# Patient Record
Sex: FEMALE | Race: WHITE | Employment: OTHER | URBAN - METROPOLITAN AREA
[De-identification: names, ages, dates, MRNs, and addresses within clinical notes are randomized per-mention and may not be internally consistent; named-entity substitution may affect disease eponyms.]

---

## 2017-01-04 RX ORDER — PRAVASTATIN SODIUM 40 MG
TABLET ORAL
Qty: 90 TABLET | Refills: 1 | Status: SHIPPED | OUTPATIENT
Start: 2017-01-04 | End: 2017-07-07 | Stop reason: SDUPTHER

## 2017-01-04 RX ORDER — LEVOTHYROXINE SODIUM 0.15 MG/1
TABLET ORAL
Qty: 90 TABLET | Refills: 1 | Status: SHIPPED | OUTPATIENT
Start: 2017-01-04 | End: 2017-07-07 | Stop reason: SDUPTHER

## 2017-01-04 RX ORDER — ATENOLOL 50 MG/1
50 TABLET ORAL DAILY
Qty: 90 TABLET | Refills: 1 | Status: SHIPPED | OUTPATIENT
Start: 2017-01-04 | End: 2017-05-01

## 2017-04-12 RX ORDER — NEBIVOLOL HYDROCHLORIDE 5 MG/1
TABLET ORAL
Qty: 90 TABLET | Refills: 2 | OUTPATIENT
Start: 2017-04-12

## 2017-05-01 ENCOUNTER — TELEPHONE (OUTPATIENT)
Dept: PHARMACY | Facility: CLINIC | Age: 62
End: 2017-05-01

## 2017-05-01 ENCOUNTER — OFFICE VISIT (OUTPATIENT)
Dept: SURGERY | Age: 62
End: 2017-05-01

## 2017-05-01 VITALS
SYSTOLIC BLOOD PRESSURE: 138 MMHG | HEART RATE: 80 BPM | HEIGHT: 66 IN | BODY MASS INDEX: 33.59 KG/M2 | DIASTOLIC BLOOD PRESSURE: 84 MMHG | WEIGHT: 209 LBS

## 2017-05-01 DIAGNOSIS — E03.9 HYPOTHYROIDISM, UNSPECIFIED TYPE: ICD-10-CM

## 2017-05-01 DIAGNOSIS — E03.9 HYPOTHYROIDISM, UNSPECIFIED TYPE: Primary | ICD-10-CM

## 2017-05-01 DIAGNOSIS — I10 ESSENTIAL HYPERTENSION: ICD-10-CM

## 2017-05-01 DIAGNOSIS — E78.5 HYPERLIPIDEMIA, UNSPECIFIED HYPERLIPIDEMIA TYPE: ICD-10-CM

## 2017-05-01 DIAGNOSIS — E04.9 THYROID GOITER: ICD-10-CM

## 2017-05-01 LAB
ANION GAP SERPL CALCULATED.3IONS-SCNC: 10 MEQ/L (ref 7–13)
BUN BLDV-MCNC: 15 MG/DL (ref 8–23)
CALCIUM SERPL-MCNC: 8.6 MG/DL (ref 8.6–10.2)
CHLORIDE BLD-SCNC: 104 MEQ/L (ref 98–107)
CHOLESTEROL, TOTAL: 171 MG/DL (ref 0–199)
CO2: 24 MEQ/L (ref 22–29)
CREAT SERPL-MCNC: 0.83 MG/DL (ref 0.5–0.9)
GFR AFRICAN AMERICAN: >60
GFR NON-AFRICAN AMERICAN: >60
GLUCOSE BLD-MCNC: 101 MG/DL (ref 74–109)
HDLC SERPL-MCNC: 36 MG/DL (ref 40–59)
LDL CHOLESTEROL CALCULATED: 104 MG/DL (ref 0–129)
POTASSIUM SERPL-SCNC: 3.9 MEQ/L (ref 3.5–5.1)
SODIUM BLD-SCNC: 138 MEQ/L (ref 132–144)
TRIGL SERPL-MCNC: 156 MG/DL (ref 0–200)
TSH SERPL DL<=0.05 MIU/L-ACNC: 2.34 UIU/ML (ref 0.27–4.2)

## 2017-05-01 PROCEDURE — 99213 OFFICE O/P EST LOW 20 MIN: CPT | Performed by: INTERNAL MEDICINE

## 2017-05-01 RX ORDER — NEBIVOLOL 5 MG/1
TABLET ORAL
Qty: 21 TABLET | Refills: 3 | Status: SHIPPED | OUTPATIENT
Start: 2017-05-01 | End: 2018-06-27 | Stop reason: SDUPTHER

## 2017-05-01 RX ORDER — NEBIVOLOL 5 MG/1
TABLET ORAL
Qty: 90 TABLET | Refills: 3 | Status: SHIPPED | OUTPATIENT
Start: 2017-05-01 | End: 2017-05-01 | Stop reason: SDUPTHER

## 2017-05-02 LAB — T4 FREE: 1.31 NG/DL (ref 0.93–1.7)

## 2017-05-06 ENCOUNTER — EMPLOYEE WELLNESS (OUTPATIENT)
Dept: OTHER | Age: 62
End: 2017-05-06

## 2017-05-06 LAB
CHOLESTEROL, TOTAL: 163 MG/DL (ref 0–199)
GLUCOSE BLD-MCNC: 96 MG/DL (ref 74–109)
HDLC SERPL-MCNC: 38 MG/DL (ref 40–59)
LDL CHOLESTEROL CALCULATED: 99 MG/DL (ref 0–129)
TRIGL SERPL-MCNC: 130 MG/DL (ref 0–200)

## 2017-05-08 ENCOUNTER — HOSPITAL ENCOUNTER (OUTPATIENT)
Dept: ULTRASOUND IMAGING | Age: 62
Discharge: HOME | End: 2017-05-08
Payer: COMMERCIAL

## 2017-05-08 DIAGNOSIS — E04.9 GOITER: ICD-10-CM

## 2017-05-08 PROCEDURE — 76536 US EXAM OF HEAD AND NECK: CPT

## 2017-07-07 RX ORDER — LEVOTHYROXINE SODIUM 150 MCG
TABLET ORAL
Qty: 90 TABLET | Refills: 1 | Status: SHIPPED | OUTPATIENT
Start: 2017-07-07 | End: 2017-10-31 | Stop reason: SDUPTHER

## 2017-07-07 RX ORDER — PRAVASTATIN SODIUM 40 MG
TABLET ORAL
Qty: 90 TABLET | Refills: 1 | Status: SHIPPED | OUTPATIENT
Start: 2017-07-07 | End: 2018-02-03 | Stop reason: SDUPTHER

## 2017-08-15 ENCOUNTER — OFFICE VISIT (OUTPATIENT)
Dept: FAMILY MEDICINE CLINIC | Age: 62
End: 2017-08-15

## 2017-08-15 VITALS
RESPIRATION RATE: 18 BRPM | WEIGHT: 210 LBS | TEMPERATURE: 97.2 F | OXYGEN SATURATION: 97 % | HEART RATE: 74 BPM | HEIGHT: 66 IN | DIASTOLIC BLOOD PRESSURE: 76 MMHG | SYSTOLIC BLOOD PRESSURE: 130 MMHG | BODY MASS INDEX: 33.75 KG/M2

## 2017-08-15 DIAGNOSIS — R10.32 LEFT LOWER QUADRANT PAIN: Primary | ICD-10-CM

## 2017-08-15 PROCEDURE — 99213 OFFICE O/P EST LOW 20 MIN: CPT | Performed by: FAMILY MEDICINE

## 2017-08-15 ASSESSMENT — ENCOUNTER SYMPTOMS
ABDOMINAL PAIN: 1
CONSTIPATION: 1
COUGH: 0
RESPIRATORY NEGATIVE: 1
EYES NEGATIVE: 1
RHINORRHEA: 0
CHEST TIGHTNESS: 0

## 2017-08-28 ENCOUNTER — HOSPITAL ENCOUNTER (OUTPATIENT)
Dept: CT IMAGING | Age: 62
Discharge: HOME | End: 2017-08-28
Payer: COMMERCIAL

## 2017-08-28 VITALS
DIASTOLIC BLOOD PRESSURE: 96 MMHG | HEIGHT: 66 IN | SYSTOLIC BLOOD PRESSURE: 154 MMHG | BODY MASS INDEX: 33.43 KG/M2 | WEIGHT: 208 LBS

## 2017-08-28 DIAGNOSIS — R10.32 LEFT LOWER QUADRANT PAIN: ICD-10-CM

## 2017-08-28 LAB
GFR AFRICAN AMERICAN: >60
GFR NON-AFRICAN AMERICAN: 56
PERFORMED ON: ABNORMAL
POC CREATININE: 1 MG/DL (ref 0.6–1.2)
POC SAMPLE TYPE: ABNORMAL

## 2017-08-28 PROCEDURE — 74177 CT ABD & PELVIS W/CONTRAST: CPT

## 2017-08-28 PROCEDURE — 6360000004 HC RX CONTRAST MEDICATION: Performed by: RADIOLOGY

## 2017-08-28 PROCEDURE — 2500000003 HC RX 250 WO HCPCS: Performed by: RADIOLOGY

## 2017-08-28 PROCEDURE — 2580000003 HC RX 258: Performed by: RADIOLOGY

## 2017-08-28 RX ORDER — SODIUM CHLORIDE 0.9 % (FLUSH) 0.9 %
10 SYRINGE (ML) INJECTION ONCE
Status: COMPLETED | OUTPATIENT
Start: 2017-08-28 | End: 2017-08-28

## 2017-08-28 RX ADMIN — IOPAMIDOL 100 ML: 755 INJECTION, SOLUTION INTRAVENOUS at 09:05

## 2017-08-28 RX ADMIN — SODIUM CHLORIDE, PRESERVATIVE FREE 10 ML: 5 INJECTION INTRAVENOUS at 09:06

## 2017-08-28 RX ADMIN — BARIUM SULFATE 450 ML: 21 SUSPENSION ORAL at 09:06

## 2017-09-06 ENCOUNTER — OFFICE VISIT (OUTPATIENT)
Dept: FAMILY MEDICINE CLINIC | Age: 62
End: 2017-09-06

## 2017-09-06 VITALS
WEIGHT: 210 LBS | SYSTOLIC BLOOD PRESSURE: 124 MMHG | RESPIRATION RATE: 18 BRPM | BODY MASS INDEX: 33.75 KG/M2 | DIASTOLIC BLOOD PRESSURE: 78 MMHG | HEIGHT: 66 IN | TEMPERATURE: 98.1 F | OXYGEN SATURATION: 97 % | HEART RATE: 74 BPM

## 2017-09-06 DIAGNOSIS — N20.0 KIDNEY STONE: Primary | ICD-10-CM

## 2017-09-06 PROCEDURE — 99213 OFFICE O/P EST LOW 20 MIN: CPT | Performed by: FAMILY MEDICINE

## 2017-09-06 ASSESSMENT — ENCOUNTER SYMPTOMS
RESPIRATORY NEGATIVE: 1
RHINORRHEA: 0
CHEST TIGHTNESS: 0
COUGH: 0
EYES NEGATIVE: 1
BACK PAIN: 1
GASTROINTESTINAL NEGATIVE: 1

## 2017-09-15 ENCOUNTER — OFFICE VISIT (OUTPATIENT)
Dept: UROLOGY | Age: 62
End: 2017-09-15

## 2017-09-15 VITALS
DIASTOLIC BLOOD PRESSURE: 86 MMHG | HEIGHT: 67 IN | SYSTOLIC BLOOD PRESSURE: 136 MMHG | WEIGHT: 210 LBS | BODY MASS INDEX: 32.96 KG/M2 | HEART RATE: 80 BPM

## 2017-09-15 DIAGNOSIS — N20.0 KIDNEY STONE: Primary | ICD-10-CM

## 2017-09-15 LAB
BILIRUBIN, POC: ABNORMAL
BLOOD URINE, POC: ABNORMAL
CLARITY, POC: CLEAR
COLOR, POC: YELLOW
GLUCOSE URINE, POC: ABNORMAL
KETONES, POC: ABNORMAL
LEUKOCYTE EST, POC: ABNORMAL
NITRITE, POC: ABNORMAL
PH, POC: 5
PROTEIN, POC: ABNORMAL
SPECIFIC GRAVITY, POC: 1.02
UROBILINOGEN, POC: 0.2

## 2017-09-15 PROCEDURE — 81003 URINALYSIS AUTO W/O SCOPE: CPT | Performed by: UROLOGY

## 2017-09-15 PROCEDURE — 99243 OFF/OP CNSLTJ NEW/EST LOW 30: CPT | Performed by: UROLOGY

## 2017-09-15 ASSESSMENT — ENCOUNTER SYMPTOMS
DIARRHEA: 0
RESPIRATORY NEGATIVE: 1
VOMITING: 0
EYES NEGATIVE: 1
ABDOMINAL DISTENTION: 0
NAUSEA: 0
BACK PAIN: 1

## 2017-10-20 ENCOUNTER — HOSPITAL ENCOUNTER (OUTPATIENT)
Dept: WOMENS IMAGING | Age: 62
Discharge: HOME | End: 2017-10-20
Payer: COMMERCIAL

## 2017-10-20 DIAGNOSIS — Z01.419 WELL WOMAN EXAM WITH ROUTINE GYNECOLOGICAL EXAM: ICD-10-CM

## 2017-10-20 PROCEDURE — 77063 BREAST TOMOSYNTHESIS BI: CPT

## 2017-10-31 RX ORDER — LEVOTHYROXINE SODIUM 0.15 MG/1
150 TABLET ORAL DAILY
Qty: 90 TABLET | Refills: 1 | Status: SHIPPED | OUTPATIENT
Start: 2017-10-31 | End: 2018-06-19 | Stop reason: SDUPTHER

## 2017-10-31 NOTE — TELEPHONE ENCOUNTER
From: Selina Charles  Sent: 10/30/2017 9:23 PM EDT  Subject: Medication Renewal Request    Louie Jimenez.  Aleksandar Tomas would like a refill of the following medications:  SYNTHROID 150 MCG tablet Rianna Wilder MD]    Preferred pharmacy: [address] [place], [place] [phone] F [phone]    Comment:

## 2017-11-04 ENCOUNTER — HOSPITAL ENCOUNTER (OUTPATIENT)
Dept: GENERAL RADIOLOGY | Age: 62
Discharge: HOME | End: 2017-11-04
Payer: COMMERCIAL

## 2017-11-04 DIAGNOSIS — N20.0 KIDNEY STONE: ICD-10-CM

## 2017-11-04 PROCEDURE — 74415 UROGRAPHY NFS DRIP&/BLS W/NF: CPT

## 2017-11-04 PROCEDURE — 6360000004 HC RX CONTRAST MEDICATION: Performed by: UROLOGY

## 2017-11-04 RX ADMIN — IOVERSOL 100 ML: 678 INJECTION INTRA-ARTERIAL; INTRAVENOUS at 09:09

## 2017-11-07 ENCOUNTER — OFFICE VISIT (OUTPATIENT)
Dept: UROLOGY | Age: 62
End: 2017-11-07

## 2017-11-07 VITALS
SYSTOLIC BLOOD PRESSURE: 120 MMHG | DIASTOLIC BLOOD PRESSURE: 72 MMHG | HEIGHT: 67 IN | HEART RATE: 64 BPM | WEIGHT: 212 LBS | BODY MASS INDEX: 33.27 KG/M2

## 2017-11-07 DIAGNOSIS — N20.0 KIDNEY STONE: Primary | ICD-10-CM

## 2017-11-07 PROCEDURE — 99213 OFFICE O/P EST LOW 20 MIN: CPT | Performed by: UROLOGY

## 2017-11-07 ASSESSMENT — ENCOUNTER SYMPTOMS: SHORTNESS OF BREATH: 0

## 2017-11-07 NOTE — PROGRESS NOTES
(PRESERVISION AREDS PO) Take  by mouth. No current facility-administered medications for this visit. Review of patient's allergies indicates no known allergies. reviewed      Review of Systems   Constitutional: Negative for fever and unexpected weight change. Respiratory: Negative for shortness of breath. Cardiovascular: Negative for chest pain. Genitourinary: Negative for dysuria, flank pain and hematuria. Objective:   Physical Exam   Constitutional: She appears well-developed and well-nourished. Abdominal: Soft. She exhibits no distension. There is no tenderness. There is no rebound and no guarding. Neurological: She is alert. Psychiatric: She has a normal mood and affect. Her behavior is normal.       11/4/2017  9:54 AM Caleb, Chpo Incoming Radiant Results From [facility] William Pappas MD Results   Narrative   FL IVP WITH TOMOGRAM : 11/4/2017       CLINICAL HISTORY: N20.0 Kidney stone ICD10.       COMPARISON: CT abdomen and pelvis 8/28/2017.       TECHNIQUE: Two  radiographs of the abdomen and pelvis were obtained. Serial radiographs were obtained after the intravenous administration of approximately 100 mL of Optiray 320 contrast.           FINDINGS:        A 1 cm nonobstructing left lower pole renal calculus appears unchanged when compared to the prior CT. Several small phleboliths in the pelvis appear unchanged.       Mild to moderate rotary levoscoliosis and surgical clips from previous cholecystectomy are again noted.        Both kidneys enhance and excrete contrast symmetrically. There is no hydronephrosis, suspicious filling defects within    the upper urinary tracts, or other findings of concern identified.        The urinary bladder appears within normal limits. There is significant no post void residual volume.                   Impression       1 CM NONOBSTRUCTING LEFT LOWER POLE RENAL CALCULUS.       OTHERWISE, NEGATIVE IVP. Assessment:       This is a 66 yo female with h/o HTN, hypothyroidism and elevated cholesterol and with Lt LP renal stone without obstruction and no current symptoms. The option of elective ESWL +/- stent was discussed and she is not currently interested. The risks and benefits of ESWL with possible cystoscopy and stent were discussed including but not limited to, infection/sepsis, pain, bleeding/transfusion, lack of efficacy and need for multiple or alternate treatments, urethral/bladder/ureteral injury, renal injury or loss, need for percutaneous nephrostomy tube. She understands that without treatment she could have stone growth, acute colic or UTI. Also, discussed dietary stone prevention, increasing urine output over 2.5l per day, normocalcemic diet, no added salt, and decreased animal proteins. Printed info on stone diet and ESWL was given to the patient today for review          Plan:      1.  F/U 6 mo for KUB or sooner if gets recurrent pain or wants treatment

## 2017-11-18 DIAGNOSIS — E78.5 HYPERLIPIDEMIA, UNSPECIFIED HYPERLIPIDEMIA TYPE: ICD-10-CM

## 2017-11-18 DIAGNOSIS — E03.9 HYPOTHYROIDISM, UNSPECIFIED TYPE: ICD-10-CM

## 2017-11-18 LAB
ALBUMIN SERPL-MCNC: 4.5 G/DL (ref 3.9–4.9)
ALP BLD-CCNC: 55 U/L (ref 40–130)
ALT SERPL-CCNC: 25 U/L (ref 0–33)
ANION GAP SERPL CALCULATED.3IONS-SCNC: 15 MEQ/L (ref 7–13)
AST SERPL-CCNC: 20 U/L (ref 0–35)
BILIRUB SERPL-MCNC: 0.1 MG/DL (ref 0–1.2)
BILIRUBIN DIRECT: 0 MG/DL (ref 0–0.3)
BILIRUBIN, INDIRECT: 0.1 MG/DL (ref 0–0.6)
BUN BLDV-MCNC: 21 MG/DL (ref 8–23)
CALCIUM SERPL-MCNC: 9.1 MG/DL (ref 8.6–10.2)
CHLORIDE BLD-SCNC: 106 MEQ/L (ref 98–107)
CHOLESTEROL, TOTAL: 200 MG/DL (ref 0–199)
CO2: 23 MEQ/L (ref 22–29)
CREAT SERPL-MCNC: 0.88 MG/DL (ref 0.5–0.9)
GFR AFRICAN AMERICAN: >60
GFR NON-AFRICAN AMERICAN: >60
GLUCOSE BLD-MCNC: 100 MG/DL (ref 74–109)
HDLC SERPL-MCNC: 31 MG/DL (ref 40–59)
LDL CHOLESTEROL CALCULATED: 126 MG/DL (ref 0–129)
POTASSIUM SERPL-SCNC: 4.9 MEQ/L (ref 3.5–5.1)
SODIUM BLD-SCNC: 144 MEQ/L (ref 132–144)
T4 FREE: 1.07 NG/DL (ref 0.93–1.7)
TOTAL PROTEIN: 7.1 G/DL (ref 6.4–8.1)
TRIGL SERPL-MCNC: 214 MG/DL (ref 0–200)
TSH SERPL DL<=0.05 MIU/L-ACNC: 16.45 UIU/ML (ref 0.27–4.2)

## 2017-11-28 ENCOUNTER — OFFICE VISIT (OUTPATIENT)
Dept: SURGERY | Age: 62
End: 2017-11-28

## 2017-11-28 VITALS
WEIGHT: 211 LBS | BODY MASS INDEX: 33.91 KG/M2 | HEIGHT: 66 IN | SYSTOLIC BLOOD PRESSURE: 137 MMHG | HEART RATE: 59 BPM | DIASTOLIC BLOOD PRESSURE: 78 MMHG

## 2017-11-28 DIAGNOSIS — I10 ESSENTIAL HYPERTENSION: ICD-10-CM

## 2017-11-28 DIAGNOSIS — E78.5 HYPERLIPIDEMIA, UNSPECIFIED HYPERLIPIDEMIA TYPE: ICD-10-CM

## 2017-11-28 DIAGNOSIS — E03.9 HYPOTHYROIDISM, UNSPECIFIED TYPE: Primary | ICD-10-CM

## 2017-11-28 PROCEDURE — 99213 OFFICE O/P EST LOW 20 MIN: CPT | Performed by: INTERNAL MEDICINE

## 2017-11-28 NOTE — PROGRESS NOTES
Subjective:        Patient ID: Philippe Sosa is a 65 y.o. female. Hypertension   This is a chronic problem. The current episode started more than 1 year ago. The problem has been waxing and waning since onset. The problem is uncontrolled. Risk factors for coronary artery disease include obesity and post-menopausal state. Past treatments include beta blockers. Compliance problems include diet. Hyperlipidemia   This is a chronic problem. The current episode started more than 1 year ago. The problem is controlled. Recent lipid tests were reviewed and are variable. Exacerbating diseases include obesity. Current antihyperlipidemic treatment includes statins. The current treatment provides moderate improvement of lipids. Compliance problems include adherence to diet. Risk factors for coronary artery disease include dyslipidemia, hypertension, obesity and post-menopausal.         Hypothyroidism on replacement with synthroid  150 mcg       Obesity  Body mass index is 34.06 kg/m². Patient Active Problem List   Diagnosis    Hypothyroidism    HTN (hypertension)    Hyperlipidemia    Hypothyroidism    Thyroid goiter    Obesity       Current Outpatient Prescriptions:     levothyroxine (SYNTHROID) 150 MCG tablet, Take 1 tablet by mouth daily, Disp: 90 tablet, Rfl: 1    pravastatin (PRAVACHOL) 40 MG tablet, TAKE ONE TABLET BY MOUTH ONE TIME A DAY, Disp: 90 tablet, Rfl: 1    nebivolol (BYSTOLIC) 5 MG tablet, TAKE 1 TABLET DAILY, Disp: 21 tablet, Rfl: 03    estradiol (DAR) 0.075 MG/24HR, Place 1 patch onto the skin once a week, Disp: 8 patch, Rfl: 5    clobetasol (TEMOVATE) 0.05 % ointment, Apply topically 2 times daily. , Disp: 30 g, Rfl: 3    estradiol (VIVELLE) 0.05 MG/24HR, Place 1 patch onto the skin See Admin Instructions.  Bi-weekly, Disp: , Rfl:     Multiple Vitamins-Minerals (PRESERVISION AREDS PO), Take  by mouth.  , Disp: , Rfl:       Review of Systems   Constitutional: Positive for fatigue and Bilirubin, Indirect Latest Ref Range: 0.0 - 0.6 mg/dL 0.1   TSH Latest Ref Range: 0.270 - 4.200 uIU/mL 16.450 (H)   T4 Free Latest Ref Range: 0.93 - 1.70 ng/dL 1.07       Assessment:      1. Hypothyroidism, unspecified type     2. Hyperlipidemia, unspecified hyperlipidemia type     3. Essential hypertension             Plan:      Orders Placed This Encounter   Procedures    Basic Metabolic Panel     Standing Status:   Future     Standing Expiration Date:   11/28/2018    Lipid Panel     Standing Status:   Future     Standing Expiration Date:   11/28/2018     Order Specific Question:   Is Patient Fasting?/# of Hours     Answer:   y    Hepatic Function Panel     Standing Status:   Future     Standing Expiration Date:   11/28/2018    T4, Free     Standing Status:   Future     Standing Expiration Date:   11/28/2018    TSH without Reflex     Standing Status:   Future     Standing Expiration Date:   11/28/2018     The current medical regimen is effective;  continue present plan and medications.

## 2018-01-26 ENCOUNTER — HOSPITAL ENCOUNTER (OUTPATIENT)
Dept: GENERAL RADIOLOGY | Age: 63
Discharge: HOME | End: 2018-01-26
Payer: COMMERCIAL

## 2018-01-26 DIAGNOSIS — M19.041 OSTEOARTHRITIS OF RIGHT HAND, UNSPECIFIED OSTEOARTHRITIS TYPE: ICD-10-CM

## 2018-01-26 PROCEDURE — 73130 X-RAY EXAM OF HAND: CPT

## 2018-02-05 RX ORDER — PRAVASTATIN SODIUM 40 MG
TABLET ORAL
Qty: 90 TABLET | Refills: 1 | Status: SHIPPED | OUTPATIENT
Start: 2018-02-05 | End: 2018-08-27 | Stop reason: SDUPTHER

## 2018-03-20 VITALS — WEIGHT: 207 LBS | BODY MASS INDEX: 33.41 KG/M2

## 2018-05-05 ENCOUNTER — HOSPITAL ENCOUNTER (OUTPATIENT)
Dept: GENERAL RADIOLOGY | Age: 63
Discharge: HOME | End: 2018-05-07
Payer: COMMERCIAL

## 2018-05-05 ENCOUNTER — EMPLOYEE WELLNESS (OUTPATIENT)
Dept: OTHER | Age: 63
End: 2018-05-05

## 2018-05-05 DIAGNOSIS — R52 PAIN: ICD-10-CM

## 2018-05-05 LAB
CHOLESTEROL, TOTAL: 172 MG/DL (ref 0–199)
GLUCOSE BLD-MCNC: 100 MG/DL (ref 74–109)
HDLC SERPL-MCNC: 33 MG/DL (ref 40–59)
LDL CHOLESTEROL CALCULATED: 108 MG/DL (ref 0–129)
TRIGL SERPL-MCNC: 155 MG/DL (ref 0–200)

## 2018-05-05 PROCEDURE — 74018 RADEX ABDOMEN 1 VIEW: CPT

## 2018-05-07 ENCOUNTER — OFFICE VISIT (OUTPATIENT)
Dept: UROLOGY | Age: 63
End: 2018-05-07
Payer: COMMERCIAL

## 2018-05-07 VITALS
DIASTOLIC BLOOD PRESSURE: 80 MMHG | BODY MASS INDEX: 33.43 KG/M2 | WEIGHT: 213 LBS | SYSTOLIC BLOOD PRESSURE: 136 MMHG | HEART RATE: 71 BPM | HEIGHT: 67 IN

## 2018-05-07 DIAGNOSIS — N20.0 KIDNEY STONE ON LEFT SIDE: Primary | ICD-10-CM

## 2018-05-07 PROCEDURE — 99213 OFFICE O/P EST LOW 20 MIN: CPT | Performed by: UROLOGY

## 2018-05-07 ASSESSMENT — ENCOUNTER SYMPTOMS
ABDOMINAL DISTENTION: 0
SHORTNESS OF BREATH: 0
ABDOMINAL PAIN: 0

## 2018-05-10 ENCOUNTER — HOSPITAL ENCOUNTER (OUTPATIENT)
Dept: ORTHOPEDIC SURGERY | Age: 63
Discharge: HOME | End: 2018-05-12
Payer: COMMERCIAL

## 2018-05-10 DIAGNOSIS — M25.562 ARTHRALGIA OF LEFT LOWER LEG: ICD-10-CM

## 2018-05-10 PROCEDURE — 73564 X-RAY EXAM KNEE 4 OR MORE: CPT

## 2018-05-14 VITALS — BODY MASS INDEX: 34.7 KG/M2 | WEIGHT: 215 LBS

## 2018-05-15 ENCOUNTER — OFFICE VISIT (OUTPATIENT)
Dept: INTERNAL MEDICINE CLINIC | Age: 63
End: 2018-05-15
Payer: COMMERCIAL

## 2018-05-15 VITALS
SYSTOLIC BLOOD PRESSURE: 122 MMHG | BODY MASS INDEX: 33.21 KG/M2 | OXYGEN SATURATION: 97 % | HEIGHT: 67 IN | DIASTOLIC BLOOD PRESSURE: 76 MMHG | HEART RATE: 78 BPM | RESPIRATION RATE: 16 BRPM | TEMPERATURE: 99 F | WEIGHT: 211.6 LBS

## 2018-05-15 DIAGNOSIS — R05.9 COUGH: ICD-10-CM

## 2018-05-15 DIAGNOSIS — J11.1 BRONCHITIS WITH INFLUENZA: Primary | ICD-10-CM

## 2018-05-15 PROCEDURE — 99213 OFFICE O/P EST LOW 20 MIN: CPT | Performed by: NURSE PRACTITIONER

## 2018-05-15 RX ORDER — DOXYCYCLINE HYCLATE 100 MG
100 TABLET ORAL 2 TIMES DAILY
Qty: 14 TABLET | Refills: 0 | Status: SHIPPED | OUTPATIENT
Start: 2018-05-15 | End: 2018-05-22

## 2018-05-15 RX ORDER — PROMETHAZINE HYDROCHLORIDE AND CODEINE PHOSPHATE 6.25; 1 MG/5ML; MG/5ML
5 SYRUP ORAL EVERY 4 HOURS PRN
Qty: 210 ML | Refills: 0 | Status: SHIPPED | OUTPATIENT
Start: 2018-05-15 | End: 2018-05-22

## 2018-05-15 RX ORDER — BENZONATATE 200 MG/1
200 CAPSULE ORAL 3 TIMES DAILY PRN
Qty: 21 CAPSULE | Refills: 0 | Status: SHIPPED | OUTPATIENT
Start: 2018-05-15 | End: 2018-05-22

## 2018-05-15 ASSESSMENT — ENCOUNTER SYMPTOMS
SHORTNESS OF BREATH: 1
HEMOPTYSIS: 0
SORE THROAT: 1
RHINORRHEA: 1
DIARRHEA: 1
CHEST TIGHTNESS: 0
VOMITING: 0
TROUBLE SWALLOWING: 0
ABDOMINAL PAIN: 0
NAUSEA: 0
SINUS PRESSURE: 1
HEARTBURN: 0
WHEEZING: 0
COUGH: 1

## 2018-05-15 ASSESSMENT — PATIENT HEALTH QUESTIONNAIRE - PHQ9
2. FEELING DOWN, DEPRESSED OR HOPELESS: 0
SUM OF ALL RESPONSES TO PHQ QUESTIONS 1-9: 0
1. LITTLE INTEREST OR PLEASURE IN DOING THINGS: 0
SUM OF ALL RESPONSES TO PHQ9 QUESTIONS 1 & 2: 0

## 2018-06-16 DIAGNOSIS — E03.9 HYPOTHYROIDISM, UNSPECIFIED TYPE: ICD-10-CM

## 2018-06-16 DIAGNOSIS — E78.5 HYPERLIPIDEMIA, UNSPECIFIED HYPERLIPIDEMIA TYPE: ICD-10-CM

## 2018-06-16 LAB
ALBUMIN SERPL-MCNC: 4.5 G/DL (ref 3.9–4.9)
ALP BLD-CCNC: 58 U/L (ref 40–130)
ALT SERPL-CCNC: 29 U/L (ref 0–33)
ANION GAP SERPL CALCULATED.3IONS-SCNC: 16 MEQ/L (ref 7–13)
AST SERPL-CCNC: 19 U/L (ref 0–35)
BILIRUB SERPL-MCNC: <0.2 MG/DL (ref 0–1.2)
BILIRUBIN DIRECT: <0.2 MG/DL (ref 0–0.3)
BILIRUBIN, INDIRECT: NORMAL MG/DL (ref 0–0.6)
BUN BLDV-MCNC: 21 MG/DL (ref 8–23)
CALCIUM SERPL-MCNC: 9.2 MG/DL (ref 8.6–10.2)
CHLORIDE BLD-SCNC: 101 MEQ/L (ref 98–107)
CHOLESTEROL, TOTAL: 193 MG/DL (ref 0–199)
CO2: 23 MEQ/L (ref 22–29)
CREAT SERPL-MCNC: 0.86 MG/DL (ref 0.5–0.9)
GFR AFRICAN AMERICAN: >60
GFR NON-AFRICAN AMERICAN: >60
GLUCOSE BLD-MCNC: 102 MG/DL (ref 74–109)
HDLC SERPL-MCNC: 41 MG/DL (ref 40–59)
LDL CHOLESTEROL CALCULATED: 126 MG/DL (ref 0–129)
POTASSIUM SERPL-SCNC: 4.4 MEQ/L (ref 3.5–5.1)
SODIUM BLD-SCNC: 140 MEQ/L (ref 132–144)
T4 FREE: 1.37 NG/DL (ref 0.93–1.7)
TOTAL PROTEIN: 7.1 G/DL (ref 6.4–8.1)
TRIGL SERPL-MCNC: 130 MG/DL (ref 0–200)
TSH SERPL DL<=0.05 MIU/L-ACNC: 7.37 UIU/ML (ref 0.27–4.2)

## 2018-06-19 ENCOUNTER — OFFICE VISIT (OUTPATIENT)
Dept: ENDOCRINOLOGY | Age: 63
End: 2018-06-19
Payer: COMMERCIAL

## 2018-06-19 VITALS
BODY MASS INDEX: 32.96 KG/M2 | WEIGHT: 210 LBS | DIASTOLIC BLOOD PRESSURE: 78 MMHG | SYSTOLIC BLOOD PRESSURE: 130 MMHG | HEART RATE: 66 BPM | HEIGHT: 67 IN

## 2018-06-19 DIAGNOSIS — I10 ESSENTIAL HYPERTENSION: ICD-10-CM

## 2018-06-19 DIAGNOSIS — E03.9 HYPOTHYROIDISM, UNSPECIFIED TYPE: Primary | ICD-10-CM

## 2018-06-19 DIAGNOSIS — E78.5 HYPERLIPIDEMIA, UNSPECIFIED HYPERLIPIDEMIA TYPE: ICD-10-CM

## 2018-06-19 PROCEDURE — 99213 OFFICE O/P EST LOW 20 MIN: CPT | Performed by: INTERNAL MEDICINE

## 2018-06-19 RX ORDER — LEVOTHYROXINE SODIUM 0.15 MG/1
150 TABLET ORAL DAILY
Qty: 90 TABLET | Refills: 3 | Status: SHIPPED | OUTPATIENT
Start: 2018-06-19 | End: 2019-08-05 | Stop reason: SDUPTHER

## 2018-06-27 ENCOUNTER — TELEPHONE (OUTPATIENT)
Dept: ENDOCRINOLOGY | Age: 63
End: 2018-06-27

## 2018-06-27 RX ORDER — BISOPROLOL FUMARATE 5 MG/1
5 TABLET ORAL DAILY
Qty: 90 TABLET | Refills: 1 | Status: SHIPPED | OUTPATIENT
Start: 2018-06-27 | End: 2019-07-09 | Stop reason: SDUPTHER

## 2018-08-27 RX ORDER — PRAVASTATIN SODIUM 40 MG
TABLET ORAL
Qty: 90 TABLET | Refills: 1 | Status: SHIPPED | OUTPATIENT
Start: 2018-08-27 | End: 2019-02-21 | Stop reason: SDUPTHER

## 2018-10-30 ENCOUNTER — HOSPITAL ENCOUNTER (OUTPATIENT)
Dept: WOMENS IMAGING | Age: 63
Discharge: HOME | End: 2018-11-01
Payer: COMMERCIAL

## 2018-10-30 DIAGNOSIS — Z12.31 BREAST CANCER SCREENING BY MAMMOGRAM: ICD-10-CM

## 2018-10-30 PROCEDURE — 77063 BREAST TOMOSYNTHESIS BI: CPT

## 2018-12-20 ENCOUNTER — HOSPITAL ENCOUNTER (OUTPATIENT)
Dept: ORTHOPEDIC SURGERY | Age: 63
Discharge: HOME | End: 2018-12-22
Payer: COMMERCIAL

## 2018-12-20 DIAGNOSIS — M25.562 ARTHRALGIA OF LEFT LOWER LEG: ICD-10-CM

## 2018-12-20 PROCEDURE — 73562 X-RAY EXAM OF KNEE 3: CPT

## 2019-01-19 DIAGNOSIS — E78.5 HYPERLIPIDEMIA, UNSPECIFIED HYPERLIPIDEMIA TYPE: ICD-10-CM

## 2019-01-19 DIAGNOSIS — E03.9 HYPOTHYROIDISM, UNSPECIFIED TYPE: ICD-10-CM

## 2019-01-19 LAB
ALBUMIN SERPL-MCNC: 4.1 G/DL (ref 3.9–4.9)
ALP BLD-CCNC: 54 U/L (ref 40–130)
ALT SERPL-CCNC: 33 U/L (ref 0–33)
ANION GAP SERPL CALCULATED.3IONS-SCNC: 12 MEQ/L (ref 7–13)
AST SERPL-CCNC: 20 U/L (ref 0–35)
BILIRUB SERPL-MCNC: <0.2 MG/DL (ref 0–1.2)
BILIRUBIN DIRECT: <0.2 MG/DL (ref 0–0.3)
BILIRUBIN, INDIRECT: NORMAL MG/DL (ref 0–0.6)
BUN BLDV-MCNC: 14 MG/DL (ref 8–23)
CALCIUM SERPL-MCNC: 8.8 MG/DL (ref 8.6–10.2)
CHLORIDE BLD-SCNC: 104 MEQ/L (ref 98–107)
CHOLESTEROL, TOTAL: 149 MG/DL (ref 0–199)
CO2: 25 MEQ/L (ref 22–29)
CREAT SERPL-MCNC: 1.09 MG/DL (ref 0.5–0.9)
GFR AFRICAN AMERICAN: >60
GFR NON-AFRICAN AMERICAN: 50.7
GLUCOSE BLD-MCNC: 113 MG/DL (ref 74–109)
HDLC SERPL-MCNC: 32 MG/DL (ref 40–59)
LDL CHOLESTEROL CALCULATED: 85 MG/DL (ref 0–129)
POTASSIUM SERPL-SCNC: 4.1 MEQ/L (ref 3.5–5.1)
SODIUM BLD-SCNC: 141 MEQ/L (ref 132–144)
T4 FREE: 1.4 NG/DL (ref 0.93–1.7)
TOTAL PROTEIN: 6.5 G/DL (ref 6.4–8.1)
TRIGL SERPL-MCNC: 160 MG/DL (ref 0–200)
TSH SERPL DL<=0.05 MIU/L-ACNC: 1.87 UIU/ML (ref 0.27–4.2)

## 2019-01-22 ENCOUNTER — OFFICE VISIT (OUTPATIENT)
Dept: ENDOCRINOLOGY | Age: 64
End: 2019-01-22
Payer: COMMERCIAL

## 2019-01-22 VITALS
SYSTOLIC BLOOD PRESSURE: 149 MMHG | DIASTOLIC BLOOD PRESSURE: 74 MMHG | WEIGHT: 216 LBS | HEART RATE: 60 BPM | HEIGHT: 67 IN | BODY MASS INDEX: 33.9 KG/M2

## 2019-01-22 DIAGNOSIS — E03.9 HYPOTHYROIDISM, UNSPECIFIED TYPE: Primary | ICD-10-CM

## 2019-01-22 DIAGNOSIS — E78.5 HYPERLIPIDEMIA, UNSPECIFIED HYPERLIPIDEMIA TYPE: ICD-10-CM

## 2019-01-22 PROCEDURE — 99213 OFFICE O/P EST LOW 20 MIN: CPT | Performed by: INTERNAL MEDICINE

## 2019-02-17 ENCOUNTER — ANESTHESIA EVENT (OUTPATIENT)
Dept: ENDOSCOPY | Age: 64
End: 2019-02-17
Payer: COMMERCIAL

## 2019-02-18 ENCOUNTER — ANESTHESIA (OUTPATIENT)
Dept: ENDOSCOPY | Age: 64
End: 2019-02-18
Payer: COMMERCIAL

## 2019-02-18 ENCOUNTER — HOSPITAL ENCOUNTER (OUTPATIENT)
Dept: MRI IMAGING | Age: 64
Discharge: HOME | End: 2019-02-20
Attending: INTERNAL MEDICINE
Payer: COMMERCIAL

## 2019-02-18 ENCOUNTER — HOSPITAL ENCOUNTER (OUTPATIENT)
Age: 64
Setting detail: OUTPATIENT SURGERY
Discharge: HOME | End: 2019-02-18
Attending: INTERNAL MEDICINE | Admitting: INTERNAL MEDICINE
Payer: COMMERCIAL

## 2019-02-18 VITALS
RESPIRATION RATE: 16 BRPM | HEART RATE: 61 BPM | OXYGEN SATURATION: 97 % | TEMPERATURE: 98.7 F | HEIGHT: 67 IN | BODY MASS INDEX: 32.96 KG/M2 | DIASTOLIC BLOOD PRESSURE: 81 MMHG | WEIGHT: 210 LBS | SYSTOLIC BLOOD PRESSURE: 121 MMHG

## 2019-02-18 VITALS
SYSTOLIC BLOOD PRESSURE: 118 MMHG | OXYGEN SATURATION: 92 % | RESPIRATION RATE: 12 BRPM | DIASTOLIC BLOOD PRESSURE: 66 MMHG

## 2019-02-18 DIAGNOSIS — M17.12 OSTEOARTHRITIS OF LEFT KNEE, UNSPECIFIED OSTEOARTHRITIS TYPE: ICD-10-CM

## 2019-02-18 PROCEDURE — 2580000003 HC RX 258: Performed by: NURSE ANESTHETIST, CERTIFIED REGISTERED

## 2019-02-18 PROCEDURE — 73721 MRI JNT OF LWR EXTRE W/O DYE: CPT

## 2019-02-18 PROCEDURE — 3700000000 HC ANESTHESIA ATTENDED CARE: Performed by: INTERNAL MEDICINE

## 2019-02-18 PROCEDURE — 88305 TISSUE EXAM BY PATHOLOGIST: CPT

## 2019-02-18 PROCEDURE — 6360000002 HC RX W HCPCS: Performed by: NURSE ANESTHETIST, CERTIFIED REGISTERED

## 2019-02-18 PROCEDURE — 3609027000 HC COLONOSCOPY: Performed by: INTERNAL MEDICINE

## 2019-02-18 PROCEDURE — 7100000011 HC PHASE II RECOVERY - ADDTL 15 MIN: Performed by: INTERNAL MEDICINE

## 2019-02-18 PROCEDURE — 7100000010 HC PHASE II RECOVERY - FIRST 15 MIN: Performed by: INTERNAL MEDICINE

## 2019-02-18 PROCEDURE — 45390 COLONOSCOPY W/RESECTION: CPT | Performed by: INTERNAL MEDICINE

## 2019-02-18 PROCEDURE — 45385 COLONOSCOPY W/LESION REMOVAL: CPT | Performed by: INTERNAL MEDICINE

## 2019-02-18 PROCEDURE — 2580000003 HC RX 258: Performed by: INTERNAL MEDICINE

## 2019-02-18 PROCEDURE — 3700000001 HC ADD 15 MINUTES (ANESTHESIA): Performed by: INTERNAL MEDICINE

## 2019-02-18 RX ORDER — ONDANSETRON 2 MG/ML
4 INJECTION INTRAMUSCULAR; INTRAVENOUS
Status: DISCONTINUED | OUTPATIENT
Start: 2019-02-18 | End: 2019-02-18 | Stop reason: HOSPADM

## 2019-02-18 RX ORDER — PROPOFOL 10 MG/ML
INJECTION, EMULSION INTRAVENOUS PRN
Status: DISCONTINUED | OUTPATIENT
Start: 2019-02-18 | End: 2019-02-18 | Stop reason: SDUPTHER

## 2019-02-18 RX ORDER — SODIUM CHLORIDE 9 MG/ML
INJECTION, SOLUTION INTRAVENOUS CONTINUOUS
Status: DISCONTINUED | OUTPATIENT
Start: 2019-02-18 | End: 2019-02-18 | Stop reason: HOSPADM

## 2019-02-18 RX ORDER — SODIUM CHLORIDE 9 MG/ML
INJECTION, SOLUTION INTRAVENOUS CONTINUOUS PRN
Status: DISCONTINUED | OUTPATIENT
Start: 2019-02-18 | End: 2019-02-18 | Stop reason: SDUPTHER

## 2019-02-18 RX ADMIN — PROPOFOL 50 MG: 10 INJECTION, EMULSION INTRAVENOUS at 08:59

## 2019-02-18 RX ADMIN — PROPOFOL 50 MG: 10 INJECTION, EMULSION INTRAVENOUS at 08:53

## 2019-02-18 RX ADMIN — PROPOFOL 50 MG: 10 INJECTION, EMULSION INTRAVENOUS at 08:54

## 2019-02-18 RX ADMIN — PROPOFOL 50 MG: 10 INJECTION, EMULSION INTRAVENOUS at 08:56

## 2019-02-18 RX ADMIN — SODIUM CHLORIDE: 9 INJECTION, SOLUTION INTRAVENOUS at 08:30

## 2019-02-18 RX ADMIN — PROPOFOL 50 MG: 10 INJECTION, EMULSION INTRAVENOUS at 09:08

## 2019-02-18 RX ADMIN — PROPOFOL 50 MG: 10 INJECTION, EMULSION INTRAVENOUS at 09:03

## 2019-02-18 RX ADMIN — PROPOFOL 50 MG: 10 INJECTION, EMULSION INTRAVENOUS at 09:14

## 2019-02-18 RX ADMIN — SODIUM CHLORIDE: 9 INJECTION, SOLUTION INTRAVENOUS at 08:50

## 2019-02-21 RX ORDER — PRAVASTATIN SODIUM 40 MG
TABLET ORAL
Qty: 90 TABLET | Refills: 1 | Status: SHIPPED | OUTPATIENT
Start: 2019-02-21 | End: 2019-09-07 | Stop reason: SDUPTHER

## 2019-03-04 ENCOUNTER — HOSPITAL ENCOUNTER (OUTPATIENT)
Dept: GENERAL RADIOLOGY | Age: 64
Discharge: HOME | End: 2019-03-06
Payer: COMMERCIAL

## 2019-03-04 DIAGNOSIS — M79.641 RIGHT HAND PAIN: ICD-10-CM

## 2019-03-04 PROCEDURE — 73130 X-RAY EXAM OF HAND: CPT

## 2019-04-27 ENCOUNTER — EMPLOYEE WELLNESS (OUTPATIENT)
Dept: OTHER | Age: 64
End: 2019-04-27

## 2019-04-27 ENCOUNTER — HOSPITAL ENCOUNTER (OUTPATIENT)
Dept: GENERAL RADIOLOGY | Age: 64
Discharge: HOME | End: 2019-04-29
Payer: COMMERCIAL

## 2019-04-27 DIAGNOSIS — N20.0 KIDNEY STONE ON LEFT SIDE: ICD-10-CM

## 2019-04-27 LAB
CHOLESTEROL, TOTAL: 176 MG/DL (ref 0–199)
GLUCOSE BLD-MCNC: 112 MG/DL (ref 70–99)
HDLC SERPL-MCNC: 38 MG/DL (ref 40–59)
LDL CHOLESTEROL CALCULATED: 114 MG/DL (ref 0–129)
TRIGL SERPL-MCNC: 122 MG/DL (ref 0–150)

## 2019-04-27 PROCEDURE — 74018 RADEX ABDOMEN 1 VIEW: CPT

## 2019-05-01 ENCOUNTER — ANESTHESIA EVENT (OUTPATIENT)
Dept: OPERATING ROOM | Age: 64
DRG: 470 | End: 2019-05-01
Payer: COMMERCIAL

## 2019-05-07 ENCOUNTER — OFFICE VISIT (OUTPATIENT)
Dept: UROLOGY | Age: 64
End: 2019-05-07
Payer: COMMERCIAL

## 2019-05-07 VITALS
WEIGHT: 215 LBS | HEIGHT: 67 IN | HEART RATE: 68 BPM | SYSTOLIC BLOOD PRESSURE: 126 MMHG | BODY MASS INDEX: 33.74 KG/M2 | DIASTOLIC BLOOD PRESSURE: 88 MMHG

## 2019-05-07 DIAGNOSIS — N20.0 KIDNEY STONE ON LEFT SIDE: Primary | ICD-10-CM

## 2019-05-07 PROCEDURE — 99213 OFFICE O/P EST LOW 20 MIN: CPT | Performed by: UROLOGY

## 2019-05-07 ASSESSMENT — ENCOUNTER SYMPTOMS
ABDOMINAL PAIN: 0
ABDOMINAL DISTENTION: 0

## 2019-05-07 NOTE — PROGRESS NOTES
Subjective:      Patient ID: Sherin Batista is a 62 y.o. female. HPI  This is a 63 yo female with h/o HTN, hypothyroidism and elevated cholesterol and back for Lt renal stone. Since last seen on 18, she has no abd or flank pain or interval colic. She has no hematuria or dysuria. She has no IVS. She has no new medical problems but is to have a Lt knee replacement in . I reviewed the interval KUB today and there is a 1 cm Lt LP stone that appears stable.      Past Medical History:   Diagnosis Date    Hyperlipidemia     Hypertension     Hyperthyroidism     Hypothyroidism     Thyroid goiter      Past Surgical History:   Procedure Laterality Date    APPENDECTOMY      BREAST BIOPSY Left 2012    X2    CHOLECYSTECTOMY      COLONOSCOPY  07   [place] COLONOSCOPY  [id number]    Niru Carreon (polyps)    COLONOSCOPY  16    w/polypectomy     COLONOSCOPY N/A 2019    COLORECTAL CANCER SCREENING, HIGH RISK performed by Tony Mcwilliams MD at [address] ARTHROSCOPY Bilateral     left X2 and Right X1     Social History     Socioeconomic History    Marital status:      Spouse name: None    Number of children: None    Years of education: None    Highest education level: None   Occupational History    None   Social Needs    Financial resource strain: None    Food insecurity:     Worry: None     Inability: None    Transportation needs:     Medical: None     Non-medical: None   Tobacco Use    Smoking status: Former Smoker     Types: Cigarettes     Last attempt to quit: 1981     Years since quittin.3    Smokeless tobacco: Never Used   Substance and Sexual Activity    Alcohol use: Yes     Comment: occ.     Drug use: No    Sexual activity: None   Lifestyle    Physical activity:     Days per week: None     Minutes per session: None    Stress: None   Relationships    Social connections:     Talks on phone: None     Gets together: None     Attends Temple service: None     Active member of club or organization: None     Attends meetings of clubs or organizations: None     Relationship status: None    Intimate partner violence:     Fear of current or ex partner: None     Emotionally abused: None     Physically abused: None     Forced sexual activity: None   Other Topics Concern    None   Social History Narrative    None     Family History   Problem Relation Age of Onset    Heart Disease Mother     Cancer Mother         RENAL CELL    COPD Mother     Diabetes Other     Colon Cancer Father      Current Outpatient Medications   Medication Sig Dispense Refill    pravastatin (PRAVACHOL) 40 MG tablet TAKE 1 TABLET BY MOUTH ONE TIME A DAY 90 tablet 1    estradiol (DAR) 0.05 MG/24HR Place 1 patch onto the skin Twice a Week 24 patch 5    clobetasol (TEMOVATE) 0.05 % ointment Apply topically 2 times daily. 30 g 3    clotrimazole-betamethasone (LOTRISONE) 1-0.05 % cream Apply topically 2 times daily. 30 g 1    bisoprolol (ZEBETA) 5 MG tablet Take 1 tablet by mouth daily (replaces Bystolic) 90 tablet 1    levothyroxine (SYNTHROID) 150 MCG tablet Take 1 tablet by mouth daily 90 tablet 3    Multiple Vitamins-Minerals (PRESERVISION AREDS PO) Take  by mouth. No current facility-administered medications for this visit. Antiseptic products, misc.  reviewed      Review of Systems   Constitutional: Negative for fever and unexpected weight change. Gastrointestinal: Negative for abdominal distention and abdominal pain. Genitourinary: Negative for difficulty urinating, dysuria, flank pain and hematuria. Objective:   Physical Exam   Constitutional: She appears well-developed and well-nourished. Abdominal: Soft. She exhibits no distension. There is no tenderness. There is no guarding. Assessment:       This is a 63 yo female with h/o HTN, hypothyroidism and elevated cholesterol and with a stable 1 cm Lt renal stone that

## 2019-06-06 ENCOUNTER — HOSPITAL ENCOUNTER (OUTPATIENT)
Dept: PREADMISSION TESTING | Age: 64
Discharge: HOME | End: 2019-06-10
Payer: COMMERCIAL

## 2019-06-06 VITALS
WEIGHT: 217 LBS | TEMPERATURE: 98.4 F | RESPIRATION RATE: 16 BRPM | HEIGHT: 67 IN | OXYGEN SATURATION: 98 % | HEART RATE: 57 BPM | SYSTOLIC BLOOD PRESSURE: 142 MMHG | BODY MASS INDEX: 34.06 KG/M2 | DIASTOLIC BLOOD PRESSURE: 72 MMHG

## 2019-06-06 DIAGNOSIS — N20.0 KIDNEY STONE ON LEFT SIDE: ICD-10-CM

## 2019-06-06 PROBLEM — M17.12 OSTEOARTHRITIS OF LEFT KNEE: Status: ACTIVE | Noted: 2019-06-06

## 2019-06-06 LAB
ABO/RH: NORMAL
ANION GAP SERPL CALCULATED.3IONS-SCNC: 13 MEQ/L (ref 9–15)
ANTIBODY SCREEN: NORMAL
BACTERIA: ABNORMAL /HPF
BILIRUBIN URINE: NEGATIVE
BLOOD, URINE: ABNORMAL
BUN BLDV-MCNC: 15 MG/DL (ref 8–23)
CALCIUM SERPL-MCNC: 9.3 MG/DL (ref 8.5–9.9)
CHLORIDE BLD-SCNC: 106 MEQ/L (ref 95–107)
CLARITY: CLEAR
CO2: 23 MEQ/L (ref 20–31)
COLOR: YELLOW
CREAT SERPL-MCNC: 0.8 MG/DL (ref 0.5–0.9)
EKG ATRIAL RATE: 56 BPM
EKG P AXIS: 29 DEGREES
EKG P-R INTERVAL: 148 MS
EKG Q-T INTERVAL: 422 MS
EKG QRS DURATION: 76 MS
EKG QTC CALCULATION (BAZETT): 407 MS
EKG R AXIS: 6 DEGREES
EKG T AXIS: 43 DEGREES
EKG VENTRICULAR RATE: 56 BPM
EPITHELIAL CELLS, UA: ABNORMAL /HPF (ref 0–5)
GFR AFRICAN AMERICAN: >60
GFR NON-AFRICAN AMERICAN: >60
GLUCOSE BLD-MCNC: 105 MG/DL (ref 70–99)
GLUCOSE URINE: NEGATIVE MG/DL
HCT VFR BLD CALC: 42.6 % (ref 37–47)
HEMOGLOBIN: 14.7 G/DL (ref 12–16)
HYALINE CASTS: ABNORMAL /HPF (ref 0–5)
INR BLD: 1
KETONES, URINE: NEGATIVE MG/DL
LEUKOCYTE ESTERASE, URINE: NEGATIVE
MCH RBC QN AUTO: 31.5 PG (ref 27–31.3)
MCHC RBC AUTO-ENTMCNC: 34.6 % (ref 33–37)
MCV RBC AUTO: 91.2 FL (ref 82–100)
NITRITE, URINE: NEGATIVE
PDW BLD-RTO: 13.8 % (ref 11.5–14.5)
PH UA: 5 (ref 5–9)
PLATELET # BLD: 246 K/UL (ref 130–400)
POTASSIUM SERPL-SCNC: 4.1 MEQ/L (ref 3.4–4.9)
PROTEIN UA: ABNORMAL MG/DL
PROTHROMBIN TIME: 10.4 SEC (ref 9–11.5)
RBC # BLD: 4.67 M/UL (ref 4.2–5.4)
RBC UA: ABNORMAL /HPF (ref 0–5)
SODIUM BLD-SCNC: 142 MEQ/L (ref 135–144)
SPECIFIC GRAVITY UA: 1.03 (ref 1–1.03)
URINE REFLEX TO CULTURE: YES
UROBILINOGEN, URINE: 0.2 E.U./DL
WBC # BLD: 6.4 K/UL (ref 4.8–10.8)
WBC UA: ABNORMAL /HPF (ref 0–5)

## 2019-06-06 PROCEDURE — 81001 URINALYSIS AUTO W/SCOPE: CPT

## 2019-06-06 PROCEDURE — 85610 PROTHROMBIN TIME: CPT

## 2019-06-06 PROCEDURE — 85027 COMPLETE CBC AUTOMATED: CPT

## 2019-06-06 PROCEDURE — 93005 ELECTROCARDIOGRAM TRACING: CPT | Performed by: NURSE PRACTITIONER

## 2019-06-06 PROCEDURE — 86850 RBC ANTIBODY SCREEN: CPT

## 2019-06-06 PROCEDURE — 86900 BLOOD TYPING SEROLOGIC ABO: CPT

## 2019-06-06 PROCEDURE — 86901 BLOOD TYPING SEROLOGIC RH(D): CPT

## 2019-06-06 PROCEDURE — 87086 URINE CULTURE/COLONY COUNT: CPT

## 2019-06-06 PROCEDURE — 80048 BASIC METABOLIC PNL TOTAL CA: CPT

## 2019-06-06 ASSESSMENT — ENCOUNTER SYMPTOMS
WHEEZING: 0
ABDOMINAL PAIN: 0
CHEST TIGHTNESS: 0
NAUSEA: 0
EYES NEGATIVE: 1
VOMITING: 0
BACK PAIN: 0
DIARRHEA: 0
TROUBLE SWALLOWING: 0
CONSTIPATION: 0
SHORTNESS OF BREATH: 0
SORE THROAT: 0
COUGH: 0
ALLERGIC/IMMUNOLOGIC NEGATIVE: 1
STRIDOR: 0

## 2019-06-06 NOTE — PROGRESS NOTES
Hibiclens wash neck to toes, front & back, left knee on Monday 6/17/2019 & Tuesday 6/18/2019. TXA Therapy protocol completed & paper copy on chart.

## 2019-06-06 NOTE — H&P
Nurse Practitioner History and Physical      CHIEF COMPLAINT:  Left knee pain    HISTORY OF PRESENT ILLNESS:      The patient is a 62 y.o. female with significant past medical history of osteoarthritis left knee who presents for left total knee replacement. States left knee problems since 1987. Has had gel injections x 13, several cortisone injections & arthroscopy x 2 of left knee in past. C/o left knee pain, stiffness, swelling & limp with ambulation. Pain of knee from hamstring to top of foot. Scheduled for OR. Past Medical History:        Diagnosis Date    Hyperlipidemia     Hypertension     Hyperthyroidism     Hypothyroidism     Thyroid goiter      Past Surgical History:    Past Surgical History:   Procedure Laterality Date    APPENDECTOMY      BREAST BIOPSY Left 2012    X2    CHOLECYSTECTOMY  2008    COLONOSCOPY  11-16-07    COLONOSCOPY  [id number]    Aristeo Carreon (polyps)    COLONOSCOPY  2/12/16    w/polypectomy     COLONOSCOPY N/A 2/18/2019    COLORECTAL CANCER SCREENING, HIGH RISK performed by Dmoingo King MD at [address] [place] [address] ARTHROSCOPY Bilateral     left X2 and Right X1         Medications Prior to Admission:    Current Outpatient Medications   Medication Sig Dispense Refill    pravastatin (PRAVACHOL) 40 MG tablet TAKE 1 TABLET BY MOUTH ONE TIME A DAY 90 tablet 1    estradiol (DAR) 0.05 MG/24HR Place 1 patch onto the skin Twice a Week 24 patch 5    clobetasol (TEMOVATE) 0.05 % ointment Apply topically 2 times daily. 30 g 3    clotrimazole-betamethasone (LOTRISONE) 1-0.05 % cream Apply topically 2 times daily. 30 g 1    bisoprolol (ZEBETA) 5 MG tablet Take 1 tablet by mouth daily (replaces Bystolic) 90 tablet 1    levothyroxine (SYNTHROID) 150 MCG tablet Take 1 tablet by mouth daily 90 tablet 3    Multiple Vitamins-Minerals (PRESERVISION AREDS PO) Take  by mouth.          No current facility-administered medications for this encounter. Allergies:  Antiseptic products, misc. Social History:   Social History     Socioeconomic History    Marital status:      Spouse name: Not on file    Number of children: Not on file    Years of education: Not on file    Highest education level: Not on file   Occupational History    Not on file   Social Needs    Financial resource strain: Not on file    Food insecurity:     Worry: Not on file     Inability: Not on file    Transportation needs:     Medical: Not on file     Non-medical: Not on file   Tobacco Use    Smoking status: Former Smoker     Types: Cigarettes     Last attempt to quit:      Years since quittin.4    Smokeless tobacco: Never Used   Substance and Sexual Activity    Alcohol use: Yes     Comment: occ.  Drug use: No    Sexual activity: Not on file   Lifestyle    Physical activity:     Days per week: Not on file     Minutes per session: Not on file    Stress: Not on file   Relationships    Social connections:     Talks on phone: Not on file     Gets together: Not on file     Attends Shinto service: Not on file     Active member of club or organization: Not on file     Attends meetings of clubs or organizations: Not on file     Relationship status: Not on file    Intimate partner violence:     Fear of current or ex partner: Not on file     Emotionally abused: Not on file     Physically abused: Not on file     Forced sexual activity: Not on file   Other Topics Concern    Not on file   Social History Narrative    Not on file       Family History:       Problem Relation Age of Onset    Heart Disease Mother     Cancer Mother         RENAL CELL    COPD Mother     Diabetes Other     Colon Cancer Father        Review of Systems   Constitutional: Negative. Negative for chills and fever. HENT: Negative for congestion, ear pain, sore throat and trouble swallowing. Eyes: Negative. Negative for visual disturbance.    Respiratory: Negative for cough, chest tightness, shortness of breath, wheezing and stridor. Cardiovascular: Negative for chest pain and palpitations. Gastrointestinal: Negative for abdominal pain, constipation, diarrhea, nausea and vomiting. Endocrine:        Hx hypothyroid / goiter   Genitourinary: Negative for dysuria and frequency. Musculoskeletal: Negative for back pain, myalgias and neck pain. Left knee pain   Skin: Negative. Allergic/Immunologic: Negative. Neurological: Negative. Negative for seizures and headaches. Hematological: Negative. Psychiatric/Behavioral: Negative. Vitals:  [facility] 09/27/1998 (Approximate)     Physical Exam   Constitutional: She is oriented to person, place, and time. She appears well-developed and well-nourished. HENT:   Head: Normocephalic and atraumatic. Mouth/Throat: Oropharynx is clear and moist. No oropharyngeal exudate. Eyes: Pupils are equal, round, and reactive to light. Conjunctivae and EOM are normal. No scleral icterus. Neck: Normal range of motion. Neck supple. No JVD present. No tracheal deviation present. No thyromegaly present. Cardiovascular: Normal rate, regular rhythm and normal heart sounds. Exam reveals no gallop. No murmur heard. Pulmonary/Chest: Effort normal and breath sounds normal. No respiratory distress. She has no wheezes. She has no rales. Abdominal: Soft. Bowel sounds are normal. There is no tenderness. Transverse pelvic OR scar. Genitourinary:   Genitourinary Comments: deferred   Musculoskeletal: Normal range of motion. She exhibits no edema. Left leg limp / slight swelling left ankle /  left knee warm to touch   Lymphadenopathy:     She has no cervical adenopathy. Neurological: She is alert and oriented to person, place, and time. Skin: Skin is warm and dry. No erythema. Psychiatric: She has a normal mood and affect.  Her behavior is normal. Judgment and thought content normal.       [unfilled]    Assessment:  Patient Active Problem List   Diagnosis    Hypothyroidism    HTN (hypertension)    Hyperlipidemia    Hypothyroidism    Thyroid goiter    Obesity    Adenomatous polyp of ascending colon         Plan:  Scheduled for left total knee replacement.     GIAN Abad CNP  6/6/2019  9:08 AM

## 2019-06-07 LAB — URINE CULTURE, ROUTINE: NORMAL

## 2019-06-07 PROCEDURE — 93010 ELECTROCARDIOGRAM REPORT: CPT | Performed by: INTERNAL MEDICINE

## 2019-06-10 ENCOUNTER — OFFICE VISIT (OUTPATIENT)
Dept: FAMILY MEDICINE CLINIC | Age: 64
End: 2019-06-10
Payer: COMMERCIAL

## 2019-06-10 VITALS
DIASTOLIC BLOOD PRESSURE: 80 MMHG | TEMPERATURE: 98.4 F | HEART RATE: 63 BPM | HEIGHT: 67 IN | WEIGHT: 220.2 LBS | BODY MASS INDEX: 34.56 KG/M2 | SYSTOLIC BLOOD PRESSURE: 120 MMHG | OXYGEN SATURATION: 97 % | RESPIRATION RATE: 16 BRPM

## 2019-06-10 DIAGNOSIS — M17.12 ARTHRITIS OF KNEE, LEFT: ICD-10-CM

## 2019-06-10 DIAGNOSIS — Z01.818 PREOP EXAMINATION: Primary | ICD-10-CM

## 2019-06-10 PROCEDURE — 99214 OFFICE O/P EST MOD 30 MIN: CPT | Performed by: FAMILY MEDICINE

## 2019-06-10 ASSESSMENT — PATIENT HEALTH QUESTIONNAIRE - PHQ9
SUM OF ALL RESPONSES TO PHQ9 QUESTIONS 1 & 2: 0
SUM OF ALL RESPONSES TO PHQ QUESTIONS 1-9: 0
2. FEELING DOWN, DEPRESSED OR HOPELESS: 0
1. LITTLE INTEREST OR PLEASURE IN DOING THINGS: 0
SUM OF ALL RESPONSES TO PHQ QUESTIONS 1-9: 0

## 2019-06-10 NOTE — PROGRESS NOTES
Chief Complaint   Patient presents with    Pre-op Exam     pt presents for a pre op exam for surgery on total right knee surgery, pt had PAT appointment last week, will be having surgery 6/19/19, surgery will be done by Lila Westbrook at [place] is a 62 y.o. female who presents for a preoperative medical consultation at the request of Dr. Jovana Sevilla prior to left tkr. Pt complains of left leg pain which is severe. Pain is improved with rest.  Pain is worsened by activity. Pt has failed conservative measures such as steroid injections, Synvisc injections, physical therapy, therefore she wishes to proceed with surgical intervention.       Reactions to anesthesia: none    History of excessive bleeding: none    History of blood clots: none    History of blood transfusions: none      Reactions to blood transfusion: none     Past Medical History:   Diagnosis Date    Arthritis     both knees    Hyperlipidemia     meds > 10 yrs    Hypertension     meds > 4 yrs    Hyperthyroidism     Hypothyroidism     meds > 20 yrs    Thyroid goiter        Past Surgical History:   Procedure Laterality Date    APPENDECTOMY  1981    with left oophorectomy    BREAST BIOPSY Left 2012    x 2 / both benign    CHOLECYSTECTOMY  2008    COLONOSCOPY  11-16-07    COLONOSCOPY  [id number]    Christine Carreon (polyps)    COLONOSCOPY  2/12/16    w/polypectomy     COLONOSCOPY N/A 2/18/2019    COLORECTAL CANCER SCREENING, HIGH RISK performed by Tae Martin MD at [address], COLON, DIAGNOSTIC     [facility] ARTHROSCOPY Bilateral 1987 2003 1994    left X2 and Right X1       Current Outpatient Medications   Medication Sig Dispense Refill    CINNAMON PO Take by mouth daily      NONFORMULARY Indications: CBD oil       pravastatin (PRAVACHOL) 40 MG tablet TAKE 1 TABLET BY MOUTH ONE TIME A DAY 90 tablet 1    estradiol (DAR) 0.05 MG/24HR Place 1 patch onto the skin Twice a Week 24 patch 5    clobetasol (TEMOVATE) 0.05 % ointment Apply topically 2 times daily. 30 g 3    clotrimazole-betamethasone (LOTRISONE) 1-0.05 % cream Apply topically 2 times daily. 30 g 1    bisoprolol (ZEBETA) 5 MG tablet Take 1 tablet by mouth daily (replaces Bystolic) 90 tablet 1    levothyroxine (SYNTHROID) 150 MCG tablet Take 1 tablet by mouth daily 90 tablet 3    Multiple Vitamins-Minerals (PRESERVISION AREDS PO) Take by mouth daily        No current facility-administered medications for this visit. Allergies   Allergen Reactions    Antiseptic Products, Misc. Rash     duraprep       Social History     Socioeconomic History    Marital status:      Spouse name: Not on file    Number of children: Not on file    Years of education: Not on file    Highest education level: Not on file   Occupational History    Not on file   Social Needs    Financial resource strain: Not on file    Food insecurity:     Worry: Not on file     Inability: Not on file    Transportation needs:     Medical: Not on file     Non-medical: Not on file   Tobacco Use    Smoking status: Former Smoker     Packs/day: 0.25     Years: 5.00     Pack years: 1.25     Types: Cigarettes     Last attempt to quit:      Years since quittin.4    Smokeless tobacco: Never Used   Substance and Sexual Activity    Alcohol use: Yes     Comment: occ.     Drug use: No     Comment: CBD oil to left knee    Sexual activity: Not on file   Lifestyle    Physical activity:     Days per week: Not on file     Minutes per session: Not on file    Stress: Not on file   Relationships    Social connections:     Talks on phone: Not on file     Gets together: Not on file     Attends Oriental orthodox service: Not on file     Active member of club or organization: Not on file     Attends meetings of clubs or organizations: Not on file     Relationship status: Not on file    Intimate partner violence:     Fear of current or ex partner: Not on file Emotionally abused: Not on file     Physically abused: Not on file     Forced sexual activity: Not on file   Other Topics Concern    Not on file   Social History Narrative    Not on file       Family History   Problem Relation Age of Onset    Heart Disease Mother     Cancer Mother         RENAL CELL    COPD Mother     Diabetes Other     Colon Cancer Father     Macular Degen Father     Cancer Brother         prostate cancer    No Known Problems Son        Review of Systems - General ROS: positive for  - Negative  Psychological ROS: negative for - REMINDER:DOCUMENT SUBSTANCE ABUSE IN SOCIAL HISTORY ACTIVITY  Hematological and Lymphatic ROS: No history of blood clots or bleeding disorder. Respiratory ROS: no cough, shortness of breath, or wheezing  Cardiovascular ROS: no chest pain or dyspnea on exertion  Gastrointestinal ROS: no abdominal pain, change in bowel habits, or black or bloody stools  Genito-Urinary ROS: no dysuria, trouble voiding, or hematuria  Musculoskeletal ROS: negative  Neurological ROS: no TIA or stroke symptoms  Dermatological ROS: negative    Blood pressure 120/80, pulse 63, temperature 98.4 °F (36.9 °C), temperature source Oral, resp. rate 16, height 5' 7\" (1.702 m), weight 220 lb 3.2 oz (99.9 kg), last menstrual period 09/27/1998, SpO2 97 %.     Physical Examination: General appearance -alert, well appearing, and in no distress  Mental status - alert, oriented to person, place, and time  Head - atraumatic, normocephalic  Eyes - pupils equal and reactive to light, extraocular muscles intact  Ears - external ears are normal, hearing is grossly normal  Mouth - oral pharynx clear, mucous membranes moist  Neck - supple, no significant adenopathy  Chest - clear to auscultation, no wheezes, rales or rhonchi, symmetric air entry  Heart - normal rate, regular rhythm, normal S1, S2, no murmurs, rubs, clicks or gallops  Abdomen -soft, nontender, nondistended  Neurological - alert, oriented, cranial nerves II-XII intact, motor and sensation are grossly intact bilateral upper and lower extremities. Extremities - peripheral pulses normal, no pedal edema, no clubbing or cyanosis  Skin - warm and dry    Diagnostic data:   I have reviewed recent diagnostic testing including labs, EKG. Please see EKG for interpretation. Assessment and Plan:    Patient is an acceptable surgical candidate for planned procedure pending pat          Diagnosis Orders   1. Preop examination     2. Arthritis of knee, left         DVT prophylaxis - Recommend early ambulation, venous return exercises, SCDs, LINCOLN hose and a low molecular weight heparin such as Lovenox 30 mg subcutaneously Q12 hrs. Allergies: Antiseptic products, misc.

## 2019-06-18 ENCOUNTER — TELEPHONE (OUTPATIENT)
Dept: FAMILY MEDICINE CLINIC | Age: 64
End: 2019-06-18

## 2019-06-19 ENCOUNTER — ANESTHESIA (OUTPATIENT)
Dept: OPERATING ROOM | Age: 64
DRG: 470 | End: 2019-06-19
Payer: COMMERCIAL

## 2019-06-19 ENCOUNTER — HOSPITAL ENCOUNTER (INPATIENT)
Age: 64
LOS: 2 days | Discharge: HOME HEALTH | DRG: 470 | End: 2019-06-21
Attending: ORTHOPAEDIC SURGERY | Admitting: ORTHOPAEDIC SURGERY
Payer: COMMERCIAL

## 2019-06-19 VITALS — SYSTOLIC BLOOD PRESSURE: 114 MMHG | OXYGEN SATURATION: 99 % | TEMPERATURE: 93.2 F | DIASTOLIC BLOOD PRESSURE: 61 MMHG

## 2019-06-19 PROBLEM — M17.10 ARTHRITIS OF KNEE: Status: ACTIVE | Noted: 2019-06-19

## 2019-06-19 LAB
ABO/RH: NORMAL
ANTIBODY SCREEN: NORMAL

## 2019-06-19 PROCEDURE — 3600000004 HC SURGERY LEVEL 4 BASE: Performed by: ORTHOPAEDIC SURGERY

## 2019-06-19 PROCEDURE — 6360000002 HC RX W HCPCS: Performed by: NURSE ANESTHETIST, CERTIFIED REGISTERED

## 2019-06-19 PROCEDURE — 2580000003 HC RX 258: Performed by: ORTHOPAEDIC SURGERY

## 2019-06-19 PROCEDURE — 2709999900 HC NON-CHARGEABLE SUPPLY: Performed by: ORTHOPAEDIC SURGERY

## 2019-06-19 PROCEDURE — G0378 HOSPITAL OBSERVATION PER HR: HCPCS

## 2019-06-19 PROCEDURE — 1210000000 HC MED SURG R&B

## 2019-06-19 PROCEDURE — 2580000003 HC RX 258: Performed by: NURSE PRACTITIONER

## 2019-06-19 PROCEDURE — 97166 OT EVAL MOD COMPLEX 45 MIN: CPT

## 2019-06-19 PROCEDURE — 2500000003 HC RX 250 WO HCPCS: Performed by: NURSE ANESTHETIST, CERTIFIED REGISTERED

## 2019-06-19 PROCEDURE — 86850 RBC ANTIBODY SCREEN: CPT

## 2019-06-19 PROCEDURE — 3700000001 HC ADD 15 MINUTES (ANESTHESIA): Performed by: ORTHOPAEDIC SURGERY

## 2019-06-19 PROCEDURE — 88305 TISSUE EXAM BY PATHOLOGIST: CPT

## 2019-06-19 PROCEDURE — 3600000014 HC SURGERY LEVEL 4 ADDTL 15MIN: Performed by: ORTHOPAEDIC SURGERY

## 2019-06-19 PROCEDURE — 3E0T3BZ INTRODUCTION OF ANESTHETIC AGENT INTO PERIPHERAL NERVES AND PLEXI, PERCUTANEOUS APPROACH: ICD-10-PCS | Performed by: ORTHOPAEDIC SURGERY

## 2019-06-19 PROCEDURE — 86900 BLOOD TYPING SEROLOGIC ABO: CPT

## 2019-06-19 PROCEDURE — 88311 DECALCIFY TISSUE: CPT

## 2019-06-19 PROCEDURE — C1713 ANCHOR/SCREW BN/BN,TIS/BN: HCPCS | Performed by: ORTHOPAEDIC SURGERY

## 2019-06-19 PROCEDURE — 2500000003 HC RX 250 WO HCPCS: Performed by: NURSE PRACTITIONER

## 2019-06-19 PROCEDURE — 6360000002 HC RX W HCPCS: Performed by: ANESTHESIOLOGY

## 2019-06-19 PROCEDURE — 6370000000 HC RX 637 (ALT 250 FOR IP): Performed by: ORTHOPAEDIC SURGERY

## 2019-06-19 PROCEDURE — 7100000001 HC PACU RECOVERY - ADDTL 15 MIN: Performed by: ORTHOPAEDIC SURGERY

## 2019-06-19 PROCEDURE — 7100000000 HC PACU RECOVERY - FIRST 15 MIN: Performed by: ORTHOPAEDIC SURGERY

## 2019-06-19 PROCEDURE — 0SRD0J9 REPLACEMENT OF LEFT KNEE JOINT WITH SYNTHETIC SUBSTITUTE, CEMENTED, OPEN APPROACH: ICD-10-PCS | Performed by: ORTHOPAEDIC SURGERY

## 2019-06-19 PROCEDURE — 3E0R3BZ INTRODUCTION OF ANESTHETIC AGENT INTO SPINAL CANAL, PERCUTANEOUS APPROACH: ICD-10-PCS | Performed by: ORTHOPAEDIC SURGERY

## 2019-06-19 PROCEDURE — 6360000002 HC RX W HCPCS: Performed by: ORTHOPAEDIC SURGERY

## 2019-06-19 PROCEDURE — 36415 COLL VENOUS BLD VENIPUNCTURE: CPT

## 2019-06-19 PROCEDURE — 64447 NJX AA&/STRD FEMORAL NRV IMG: CPT | Performed by: ANESTHESIOLOGY

## 2019-06-19 PROCEDURE — 97162 PT EVAL MOD COMPLEX 30 MIN: CPT

## 2019-06-19 PROCEDURE — 86901 BLOOD TYPING SEROLOGIC RH(D): CPT

## 2019-06-19 PROCEDURE — 2720000010 HC SURG SUPPLY STERILE: Performed by: ORTHOPAEDIC SURGERY

## 2019-06-19 PROCEDURE — C1776 JOINT DEVICE (IMPLANTABLE): HCPCS | Performed by: ORTHOPAEDIC SURGERY

## 2019-06-19 PROCEDURE — 3700000000 HC ANESTHESIA ATTENDED CARE: Performed by: ORTHOPAEDIC SURGERY

## 2019-06-19 DEVICE — IMPLANTABLE DEVICE: Type: IMPLANTABLE DEVICE | Site: KNEE | Status: FUNCTIONAL

## 2019-06-19 DEVICE — IMPLANTABLE DEVICE: Type: IMPLANTABLE DEVICE | Site: PATELLA | Status: FUNCTIONAL

## 2019-06-19 DEVICE — CEMENT BNE 40GM FULL DOSE PMMA W/O ANTIBIO HI VISC N RADPQ: Type: IMPLANTABLE DEVICE | Site: KNEE | Status: FUNCTIONAL

## 2019-06-19 RX ORDER — ONDANSETRON 2 MG/ML
4 INJECTION INTRAMUSCULAR; INTRAVENOUS EVERY 6 HOURS PRN
Status: DISCONTINUED | OUTPATIENT
Start: 2019-06-19 | End: 2019-06-21 | Stop reason: HOSPADM

## 2019-06-19 RX ORDER — HYDROCODONE BITARTRATE AND ACETAMINOPHEN 5; 325 MG/1; MG/1
1 TABLET ORAL PRN
Status: DISCONTINUED | OUTPATIENT
Start: 2019-06-19 | End: 2019-06-19

## 2019-06-19 RX ORDER — DEXTROSE AND SODIUM CHLORIDE 5; .45 G/100ML; G/100ML
INJECTION, SOLUTION INTRAVENOUS CONTINUOUS
Status: DISCONTINUED | OUTPATIENT
Start: 2019-06-19 | End: 2019-06-21 | Stop reason: HOSPADM

## 2019-06-19 RX ORDER — DIPHENHYDRAMINE HYDROCHLORIDE 50 MG/ML
12.5 INJECTION INTRAMUSCULAR; INTRAVENOUS
Status: DISCONTINUED | OUTPATIENT
Start: 2019-06-19 | End: 2019-06-19

## 2019-06-19 RX ORDER — CEFAZOLIN SODIUM 1 G/50ML
INJECTION, SOLUTION INTRAVENOUS PRN
Status: DISCONTINUED | OUTPATIENT
Start: 2019-06-19 | End: 2019-06-19 | Stop reason: SDUPTHER

## 2019-06-19 RX ORDER — ROPIVACAINE HYDROCHLORIDE 5 MG/ML
INJECTION, SOLUTION EPIDURAL; INFILTRATION; PERINEURAL PRN
Status: DISCONTINUED | OUTPATIENT
Start: 2019-06-19 | End: 2019-06-19 | Stop reason: SDUPTHER

## 2019-06-19 RX ORDER — METOCLOPRAMIDE HYDROCHLORIDE 5 MG/ML
10 INJECTION INTRAMUSCULAR; INTRAVENOUS
Status: DISCONTINUED | OUTPATIENT
Start: 2019-06-19 | End: 2019-06-19

## 2019-06-19 RX ORDER — SODIUM CHLORIDE, SODIUM LACTATE, POTASSIUM CHLORIDE, CALCIUM CHLORIDE 600; 310; 30; 20 MG/100ML; MG/100ML; MG/100ML; MG/100ML
INJECTION, SOLUTION INTRAVENOUS CONTINUOUS
Status: DISCONTINUED | OUTPATIENT
Start: 2019-06-19 | End: 2019-06-19

## 2019-06-19 RX ORDER — KETOROLAC TROMETHAMINE 15 MG/ML
15 INJECTION, SOLUTION INTRAMUSCULAR; INTRAVENOUS EVERY 6 HOURS
Status: DISCONTINUED | OUTPATIENT
Start: 2019-06-19 | End: 2019-06-20

## 2019-06-19 RX ORDER — MORPHINE SULFATE 4 MG/ML
4 INJECTION, SOLUTION INTRAMUSCULAR; INTRAVENOUS
Status: DISCONTINUED | OUTPATIENT
Start: 2019-06-19 | End: 2019-06-21 | Stop reason: HOSPADM

## 2019-06-19 RX ORDER — ACETAMINOPHEN 325 MG/1
650 TABLET ORAL EVERY 4 HOURS PRN
Status: DISCONTINUED | OUTPATIENT
Start: 2019-06-19 | End: 2019-06-20

## 2019-06-19 RX ORDER — HYDROCODONE BITARTRATE AND ACETAMINOPHEN 5; 325 MG/1; MG/1
2 TABLET ORAL PRN
Status: DISCONTINUED | OUTPATIENT
Start: 2019-06-19 | End: 2019-06-19

## 2019-06-19 RX ORDER — MORPHINE SULFATE 2 MG/ML
2 INJECTION, SOLUTION INTRAMUSCULAR; INTRAVENOUS
Status: DISCONTINUED | OUTPATIENT
Start: 2019-06-19 | End: 2019-06-21 | Stop reason: HOSPADM

## 2019-06-19 RX ORDER — MAGNESIUM HYDROXIDE 1200 MG/15ML
LIQUID ORAL PRN
Status: DISCONTINUED | OUTPATIENT
Start: 2019-06-19 | End: 2019-06-19 | Stop reason: ALTCHOICE

## 2019-06-19 RX ORDER — FENTANYL CITRATE 50 UG/ML
50 INJECTION, SOLUTION INTRAMUSCULAR; INTRAVENOUS EVERY 10 MIN PRN
Status: DISCONTINUED | OUTPATIENT
Start: 2019-06-19 | End: 2019-06-19

## 2019-06-19 RX ORDER — SODIUM CHLORIDE 0.9 % (FLUSH) 0.9 %
10 SYRINGE (ML) INJECTION PRN
Status: DISCONTINUED | OUTPATIENT
Start: 2019-06-19 | End: 2019-06-19

## 2019-06-19 RX ORDER — LIDOCAINE HYDROCHLORIDE 10 MG/ML
1 INJECTION, SOLUTION EPIDURAL; INFILTRATION; INTRACAUDAL; PERINEURAL
Status: DISCONTINUED | OUTPATIENT
Start: 2019-06-19 | End: 2019-06-19

## 2019-06-19 RX ORDER — VITS A,C,E/LUTEIN/MINERALS 300MCG-200
1 TABLET ORAL DAILY
Status: DISCONTINUED | OUTPATIENT
Start: 2019-06-19 | End: 2019-06-21 | Stop reason: HOSPADM

## 2019-06-19 RX ORDER — ESTRADIOL 0.05 MG/D
1 FILM, EXTENDED RELEASE TRANSDERMAL
Status: DISCONTINUED | OUTPATIENT
Start: 2019-06-20 | End: 2019-06-21 | Stop reason: HOSPADM

## 2019-06-19 RX ORDER — ONDANSETRON 2 MG/ML
INJECTION INTRAMUSCULAR; INTRAVENOUS PRN
Status: DISCONTINUED | OUTPATIENT
Start: 2019-06-19 | End: 2019-06-19 | Stop reason: SDUPTHER

## 2019-06-19 RX ORDER — LIDOCAINE HYDROCHLORIDE 20 MG/ML
INJECTION, SOLUTION INTRAVENOUS PRN
Status: DISCONTINUED | OUTPATIENT
Start: 2019-06-19 | End: 2019-06-19 | Stop reason: SDUPTHER

## 2019-06-19 RX ORDER — LIDOCAINE HYDROCHLORIDE 10 MG/ML
1 INJECTION, SOLUTION EPIDURAL; INFILTRATION; INTRACAUDAL; PERINEURAL
Status: COMPLETED | OUTPATIENT
Start: 2019-06-19 | End: 2019-06-19

## 2019-06-19 RX ORDER — DOCUSATE SODIUM 100 MG/1
100 CAPSULE, LIQUID FILLED ORAL 2 TIMES DAILY
Status: DISCONTINUED | OUTPATIENT
Start: 2019-06-19 | End: 2019-06-21 | Stop reason: HOSPADM

## 2019-06-19 RX ORDER — OXYCODONE HYDROCHLORIDE 5 MG/1
5 TABLET ORAL EVERY 4 HOURS PRN
Status: DISCONTINUED | OUTPATIENT
Start: 2019-06-19 | End: 2019-06-20

## 2019-06-19 RX ORDER — PROPOFOL 10 MG/ML
INJECTION, EMULSION INTRAVENOUS CONTINUOUS PRN
Status: DISCONTINUED | OUTPATIENT
Start: 2019-06-19 | End: 2019-06-19 | Stop reason: SDUPTHER

## 2019-06-19 RX ORDER — MAGNESIUM HYDROXIDE 1200 MG/15ML
LIQUID ORAL CONTINUOUS PRN
Status: COMPLETED | OUTPATIENT
Start: 2019-06-19 | End: 2019-06-19

## 2019-06-19 RX ORDER — LEVOTHYROXINE SODIUM 0.15 MG/1
150 TABLET ORAL DAILY
Status: DISCONTINUED | OUTPATIENT
Start: 2019-06-19 | End: 2019-06-21 | Stop reason: HOSPADM

## 2019-06-19 RX ORDER — ONDANSETRON 2 MG/ML
4 INJECTION INTRAMUSCULAR; INTRAVENOUS
Status: DISCONTINUED | OUTPATIENT
Start: 2019-06-19 | End: 2019-06-19

## 2019-06-19 RX ORDER — TRANEXAMIC ACID 100 MG/ML
INJECTION, SOLUTION INTRAVENOUS PRN
Status: DISCONTINUED | OUTPATIENT
Start: 2019-06-19 | End: 2019-06-19 | Stop reason: SDUPTHER

## 2019-06-19 RX ORDER — SODIUM CHLORIDE 0.9 % (FLUSH) 0.9 %
10 SYRINGE (ML) INJECTION EVERY 12 HOURS SCHEDULED
Status: DISCONTINUED | OUTPATIENT
Start: 2019-06-19 | End: 2019-06-19

## 2019-06-19 RX ORDER — MEPERIDINE HYDROCHLORIDE 25 MG/ML
12.5 INJECTION INTRAMUSCULAR; INTRAVENOUS; SUBCUTANEOUS EVERY 5 MIN PRN
Status: DISCONTINUED | OUTPATIENT
Start: 2019-06-19 | End: 2019-06-19

## 2019-06-19 RX ORDER — SODIUM CHLORIDE 0.9 % (FLUSH) 0.9 %
10 SYRINGE (ML) INJECTION PRN
Status: DISCONTINUED | OUTPATIENT
Start: 2019-06-19 | End: 2019-06-21 | Stop reason: HOSPADM

## 2019-06-19 RX ORDER — SODIUM CHLORIDE 0.9 % (FLUSH) 0.9 %
10 SYRINGE (ML) INJECTION EVERY 12 HOURS SCHEDULED
Status: DISCONTINUED | OUTPATIENT
Start: 2019-06-19 | End: 2019-06-21 | Stop reason: HOSPADM

## 2019-06-19 RX ORDER — DEXAMETHASONE SODIUM PHOSPHATE 4 MG/ML
INJECTION, SOLUTION INTRA-ARTICULAR; INTRALESIONAL; INTRAMUSCULAR; INTRAVENOUS; SOFT TISSUE PRN
Status: DISCONTINUED | OUTPATIENT
Start: 2019-06-19 | End: 2019-06-19 | Stop reason: SDUPTHER

## 2019-06-19 RX ORDER — CLOTRIMAZOLE AND BETAMETHASONE DIPROPIONATE 10; .64 MG/G; MG/G
CREAM TOPICAL 2 TIMES DAILY
Status: DISCONTINUED | OUTPATIENT
Start: 2019-06-19 | End: 2019-06-21 | Stop reason: HOSPADM

## 2019-06-19 RX ORDER — CLOBETASOL PROPIONATE 0.5 MG/G
OINTMENT TOPICAL 2 TIMES DAILY
Status: DISCONTINUED | OUTPATIENT
Start: 2019-06-19 | End: 2019-06-21 | Stop reason: HOSPADM

## 2019-06-19 RX ORDER — CEFAZOLIN SODIUM 2 G/50ML
2 SOLUTION INTRAVENOUS
Status: DISCONTINUED | OUTPATIENT
Start: 2019-06-19 | End: 2019-06-19

## 2019-06-19 RX ORDER — MIDAZOLAM HYDROCHLORIDE 1 MG/ML
INJECTION INTRAMUSCULAR; INTRAVENOUS PRN
Status: DISCONTINUED | OUTPATIENT
Start: 2019-06-19 | End: 2019-06-19 | Stop reason: SDUPTHER

## 2019-06-19 RX ORDER — BUPIVACAINE HYDROCHLORIDE 7.5 MG/ML
INJECTION, SOLUTION INTRASPINAL PRN
Status: DISCONTINUED | OUTPATIENT
Start: 2019-06-19 | End: 2019-06-19 | Stop reason: SDUPTHER

## 2019-06-19 RX ADMIN — FENTANYL CITRATE 100 MCG: 50 INJECTION, SOLUTION INTRAMUSCULAR; INTRAVENOUS at 07:10

## 2019-06-19 RX ADMIN — CEFAZOLIN SODIUM 2 G: 1 INJECTION, SOLUTION INTRAVENOUS at 15:30

## 2019-06-19 RX ADMIN — CEFAZOLIN SODIUM 2 G: 1 INJECTION, SOLUTION INTRAVENOUS at 07:30

## 2019-06-19 RX ADMIN — MORPHINE SULFATE 2 MG: 2 INJECTION, SOLUTION INTRAMUSCULAR; INTRAVENOUS at 14:22

## 2019-06-19 RX ADMIN — TRANEXAMIC ACID 1000 MG: 1 INJECTION, SOLUTION INTRAVENOUS at 08:55

## 2019-06-19 RX ADMIN — I-VITE, TAB 1000-60-2MG (60/BT) 1 TABLET: TAB at 21:13

## 2019-06-19 RX ADMIN — ACETAMINOPHEN 650 MG: 325 TABLET ORAL at 16:32

## 2019-06-19 RX ADMIN — KETOROLAC TROMETHAMINE 15 MG: 15 INJECTION, SOLUTION INTRAMUSCULAR; INTRAVENOUS at 21:13

## 2019-06-19 RX ADMIN — MIDAZOLAM HYDROCHLORIDE 2 MG: 1 INJECTION, SOLUTION INTRAMUSCULAR; INTRAVENOUS at 07:10

## 2019-06-19 RX ADMIN — ACETAMINOPHEN 650 MG: 325 TABLET ORAL at 21:13

## 2019-06-19 RX ADMIN — MORPHINE SULFATE 2 MG: 2 INJECTION, SOLUTION INTRAMUSCULAR; INTRAVENOUS at 17:46

## 2019-06-19 RX ADMIN — CEFAZOLIN SODIUM 2 G: 1 INJECTION, SOLUTION INTRAVENOUS at 23:55

## 2019-06-19 RX ADMIN — OXYCODONE HYDROCHLORIDE 5 MG: 5 TABLET ORAL at 12:03

## 2019-06-19 RX ADMIN — KETOROLAC TROMETHAMINE 15 MG: 15 INJECTION, SOLUTION INTRAMUSCULAR; INTRAVENOUS at 14:56

## 2019-06-19 RX ADMIN — ROPIVACAINE HYDROCHLORIDE 30 ML: 5 INJECTION, SOLUTION EPIDURAL; INFILTRATION; PERINEURAL at 07:15

## 2019-06-19 RX ADMIN — OXYCODONE HYDROCHLORIDE 5 MG: 5 TABLET ORAL at 16:32

## 2019-06-19 RX ADMIN — SODIUM CHLORIDE, POTASSIUM CHLORIDE, SODIUM LACTATE AND CALCIUM CHLORIDE: 600; 310; 30; 20 INJECTION, SOLUTION INTRAVENOUS at 09:22

## 2019-06-19 RX ADMIN — BUPIVACAINE HYDROCHLORIDE IN DEXTROSE 2 ML: 7.5 INJECTION, SOLUTION SUBARACHNOID at 07:20

## 2019-06-19 RX ADMIN — ONDANSETRON 4 MG: 2 INJECTION INTRAMUSCULAR; INTRAVENOUS at 09:03

## 2019-06-19 RX ADMIN — Medication 10 ML: at 21:16

## 2019-06-19 RX ADMIN — SODIUM CHLORIDE, POTASSIUM CHLORIDE, SODIUM LACTATE AND CALCIUM CHLORIDE: 600; 310; 30; 20 INJECTION, SOLUTION INTRAVENOUS at 08:17

## 2019-06-19 RX ADMIN — TRANEXAMIC ACID 1000 MG: 1 INJECTION, SOLUTION INTRAVENOUS at 07:30

## 2019-06-19 RX ADMIN — PROPOFOL 50 MCG/KG/MIN: 10 INJECTION, EMULSION INTRAVENOUS at 07:40

## 2019-06-19 RX ADMIN — METOPROLOL TARTRATE 25 MG: 25 TABLET ORAL at 21:14

## 2019-06-19 RX ADMIN — LEVOTHYROXINE SODIUM 150 MCG: 150 TABLET ORAL at 14:31

## 2019-06-19 RX ADMIN — DEXAMETHASONE SODIUM PHOSPHATE 4 MG: 4 INJECTION, SOLUTION INTRA-ARTICULAR; INTRALESIONAL; INTRAMUSCULAR; INTRAVENOUS; SOFT TISSUE at 07:50

## 2019-06-19 RX ADMIN — MIDAZOLAM HYDROCHLORIDE 2 MG: 1 INJECTION, SOLUTION INTRAMUSCULAR; INTRAVENOUS at 07:30

## 2019-06-19 RX ADMIN — SODIUM CHLORIDE, POTASSIUM CHLORIDE, SODIUM LACTATE AND CALCIUM CHLORIDE: 600; 310; 30; 20 INJECTION, SOLUTION INTRAVENOUS at 07:07

## 2019-06-19 RX ADMIN — DOCUSATE SODIUM 100 MG: 100 CAPSULE, LIQUID FILLED ORAL at 21:14

## 2019-06-19 RX ADMIN — LIDOCAINE HYDROCHLORIDE 0.1 ML: 10 INJECTION, SOLUTION EPIDURAL; INFILTRATION; INTRACAUDAL; PERINEURAL at 07:06

## 2019-06-19 RX ADMIN — OXYCODONE HYDROCHLORIDE 5 MG: 5 TABLET ORAL at 21:13

## 2019-06-19 RX ADMIN — LIDOCAINE HYDROCHLORIDE 50 MG: 20 INJECTION, SOLUTION INTRAVENOUS at 07:40

## 2019-06-19 ASSESSMENT — PULMONARY FUNCTION TESTS
PIF_VALUE: 0
PIF_VALUE: 18
PIF_VALUE: 17
PIF_VALUE: 17
PIF_VALUE: 18
PIF_VALUE: 0
PIF_VALUE: 0
PIF_VALUE: 18
PIF_VALUE: 18
PIF_VALUE: 1
PIF_VALUE: 18
PIF_VALUE: 18
PIF_VALUE: 17
PIF_VALUE: 18
PIF_VALUE: 1
PIF_VALUE: 18
PIF_VALUE: 17
PIF_VALUE: 18
PIF_VALUE: 18
PIF_VALUE: 0
PIF_VALUE: 19
PIF_VALUE: 18
PIF_VALUE: 0
PIF_VALUE: 18
PIF_VALUE: 17
PIF_VALUE: 18
PIF_VALUE: 0
PIF_VALUE: 18
PIF_VALUE: 0
PIF_VALUE: 18
PIF_VALUE: 17
PIF_VALUE: 18
PIF_VALUE: 18
PIF_VALUE: 17
PIF_VALUE: 0
PIF_VALUE: 18
PIF_VALUE: 17
PIF_VALUE: 18
PIF_VALUE: 17
PIF_VALUE: 18
PIF_VALUE: 0
PIF_VALUE: 17
PIF_VALUE: 18
PIF_VALUE: 17
PIF_VALUE: 19
PIF_VALUE: 18
PIF_VALUE: 17
PIF_VALUE: 18
PIF_VALUE: 17
PIF_VALUE: 18
PIF_VALUE: 0
PIF_VALUE: 18
PIF_VALUE: 0
PIF_VALUE: 3
PIF_VALUE: 18
PIF_VALUE: 17
PIF_VALUE: 18
PIF_VALUE: 17
PIF_VALUE: 18
PIF_VALUE: 18
PIF_VALUE: 1
PIF_VALUE: 17
PIF_VALUE: 17
PIF_VALUE: 18

## 2019-06-19 ASSESSMENT — PAIN DESCRIPTION - DESCRIPTORS: DESCRIPTORS: ACHING

## 2019-06-19 ASSESSMENT — PAIN SCALES - GENERAL
PAINLEVEL_OUTOF10: 10
PAINLEVEL_OUTOF10: 7
PAINLEVEL_OUTOF10: 6
PAINLEVEL_OUTOF10: 10
PAINLEVEL_OUTOF10: 6
PAINLEVEL_OUTOF10: 5
PAINLEVEL_OUTOF10: 6
PAINLEVEL_OUTOF10: 5
PAINLEVEL_OUTOF10: 0

## 2019-06-19 ASSESSMENT — PAIN DESCRIPTION - PAIN TYPE
TYPE: SURGICAL PAIN
TYPE: SURGICAL PAIN;ACUTE PAIN

## 2019-06-19 ASSESSMENT — PAIN DESCRIPTION - LOCATION: LOCATION: LEG

## 2019-06-19 ASSESSMENT — PAIN - FUNCTIONAL ASSESSMENT: PAIN_FUNCTIONAL_ASSESSMENT: 0-10

## 2019-06-19 ASSESSMENT — PAIN DESCRIPTION - ORIENTATION: ORIENTATION: LEFT

## 2019-06-19 NOTE — PROGRESS NOTES
Physical Therapy Med Surg Initial Assessment  Facility/Department: [facility] NEURO  Room: N223/N223-01       NAME: Hayley Johns  : 1955 (62 y.o.)  MRN: [id number]  CODE STATUS: Full Code    Date of Service: 2019    Patient Diagnosis(es): Arthritis of knee [M17.10]   No chief complaint on file.     Patient Active Problem List    Diagnosis Date Noted    Arthritis of knee 2019    Osteoarthritis of left knee 2019    Kidney stone on left side 2019    Adenomatous polyp of ascending colon     Obesity 2014    Hyperlipidemia     Hypothyroidism     Thyroid goiter     HTN (hypertension) 10/01/2012    Hypothyroidism 2012        Past Medical History:   Diagnosis Date    Arthritis     both knees    Hyperlipidemia     meds > 10 yrs    Hypertension     meds > 4 yrs    Hyperthyroidism     Hypothyroidism     meds > 20 yrs    Thyroid goiter      Past Surgical History:   Procedure Laterality Date   [address]    with left oophorectomy    BREAST BIOPSY Left 2012    x 2 / both benign    CHOLECYSTECTOMY      COLONOSCOPY  07    COLONOSCOPY  [id number]    Saeid Carreon (polyps)    COLONOSCOPY  16    w/polypectomy     COLONOSCOPY N/A 2019    COLORECTAL CANCER SCREENING, HIGH RISK performed by Efrem Choudhary MD at [facility], [address] ARTHROSCOPY Bilateral 1987    left X2 and Right X1    TOTAL KNEE ARTHROPLASTY Left 2019    LEFT TOTAL KNEE REPLACEMENT- DEPUY performed by Starla Aragon MD at [facility] OR       Chart Reviewed: Yes  Patient assessed for rehabilitation services?: Yes  Family / Caregiver Present: No  General Comment  Comments: Pt awake in bed, agreeable to PT eval    Restrictions:  Restrictions/Precautions: Weight Bearing, Fall Risk  Lower Extremity Weight Bearing Restrictions  Left Lower Extremity Weight Bearing: Weight Bearing As Tolerated SUBJECTIVE:    Pre Treatment Pain Screening  Pain at present: 5  Intervention List: Patient able to continue with treatment  Comments / Details: pt reports pain much more controlled after taking pain meds    Post Treatment Pain Screening:   Pain Screening  Patient Currently in Pain: Yes  Pain Assessment  Pain Level: 5  Pain Type: Surgical pain;Acute pain    Prior Level of Function:  Social/Functional History  Lives With: Spouse  Type of Home: House  Home Layout: One level  Home Access: Stairs to enter with rails  Entrance Stairs - Number of Steps: 3  Entrance Stairs - Rails: Right  Bathroom Shower/Tub: Tub/Shower unit  Bathroom Equipment: Shower chair, 3-in-1 commode  Home Equipment: Reacher, Sock aid, Long-handled shoehorn  ADL Assistance: Independent  Homemaking Assistance: Independent  Homemaking Responsibilities: Yes  Ambulation Assistance: Independent  Transfer Assistance: Independent  Active : Yes  Mode of Transportation: Car  Occupation: Full time employment  Type of occupation:  in [employer]    OBJECTIVE:   Vision/Hearing:  Vision: Impaired  Vision Exceptions: Wears glasses at all times  Hearing: Within functional limits    Cognition:  Overall Orientation Status: Within Functional Limits  Follows Commands: Within Functional Limits         ROM:  RLE AROM: WFL  LLE AROM : WFL  LLE General AROM: knee NT    Strength:  Strength RLE  Comment: observed >/= 3/5  Strength LLE  Strength LLE: WFL    Neuro:  Balance  Sitting - Static: Good  Sitting - Dynamic: Good  Standing - Static: Fair  Standing - Dynamic: Fair;-        Sensation  Overall Sensation Status: WFL    Bed mobility  Supine to Sit: Minimal assistance(pt did require assist to clear LLE to bring to EOB)  Sit to Supine: Unable to assess(pt up to chair)    Transfers  Sit to Stand: Stand by assistance  Stand to sit: Stand by assistance  Bed to Chair: Stand by assistance(minimal VCs for walker safety, otherwise pt steady with BUE to go home  Long term goals  Long term goal 1: indep with bed mobility  Long term goal 2: indep with transfers  Long term goal 3: pt to ambulate >50 ft supervision with FWW  Long term goal 4: pt to navigate 12 steps with supervision    [facility] (6 CLICK) [address] Mobility Raw Score : 18     Therapy Time:   Individual   Time In 1540   Time Out 1602   Minutes 22           Shona Anderson, PT, 06/19/19 at 4:34 PM

## 2019-06-19 NOTE — ANESTHESIA PRE PROCEDURE
IVPB (duplex)  2 g Intravenous On Call to Johanna Payne MD        fentaNYL (SUBLIMAZE) injection 50 mcg  50 mcg Intravenous Q10 Min PRN Adams Ahumada, MD        HYDROmorphone (DILAUDID) injection 0.5 mg  0.5 mg Intravenous Q10 Min PRN Adams Ahumada, MD        HYDROcodone-acetaminophen [facility]) 5-325 MG per tablet 1 tablet  1 tablet Oral PRN Adams Ahumada, MD        Or    HYDROcodone-acetaminophen [facility]) 5-325 MG per tablet 2 tablet  2 tablet Oral PRN Adams Ahumada, MD        diphenhydrAMINE (BENADRYL) injection 12.5 mg  12.5 mg Intravenous Once PRN Adams Ahumada, MD        ondansetron [facility]) injection 4 mg  4 mg Intravenous Once PRN Adams Ahumada, MD        metoclopramide [facility]) injection 10 mg  10 mg Intravenous Once PRN Adams Ahumada, MD        meperidine (DEMEROL) injection 12.5 mg  12.5 mg Intravenous Q5 Min PRN Adams Ahumada, MD        lactated ringers infusion   Intravenous Continuous Adams Ahumada, MD        sodium chloride flush 0.9 % injection 10 mL  10 mL Intravenous 2 times per day Adams Ahumada, MD        sodium chloride flush 0.9 % injection 10 mL  10 mL Intravenous PRN Adams Ahumada, MD        lidocaine PF 1 % injection 1 mL  1 mL Intradermal Once PRN Adams Ahumada, MD           Allergies: Allergies   Allergen Reactions    Antiseptic Products, Misc.  Rash     duraprep       Problem List:    Patient Active Problem List   Diagnosis Code    Hypothyroidism E03.9    HTN (hypertension) I10    Hyperlipidemia E78.5    Hypothyroidism E03.9    Thyroid goiter E04.9    Obesity E66.9    Adenomatous polyp of ascending colon D12.2    Osteoarthritis of left knee M17.12    Kidney stone on left side N20.0       Past Medical History:        Diagnosis Date    Arthritis     both knees    Hyperlipidemia     meds > 10 yrs    Hypertension     meds > 4 yrs    Hyperthyroidism     Hypothyroidism     meds > 20 yrs    Thyroid goiter        Past Surgical History:        Procedure Laterality Date    [address]    with left oophorectomy    BREAST BIOPSY Left 2012    x 2 / both benign    CHOLECYSTECTOMY      COLONOSCOPY  07    COLONOSCOPY  [id number]    Nadine Carreon (polyps)    COLONOSCOPY  16    w/polypectomy     COLONOSCOPY N/A 2019    COLORECTAL CANCER SCREENING, HIGH RISK performed by Beto Ingram MD at [place] [address], DIAGNOSTIC     [address] ARTHROSCOPY Bilateral 1987    left X2 and Right X1       Social History:    Social History     Tobacco Use    Smoking status: Former Smoker     Packs/day: 0.25     Years: 5.00     Pack years: 1.25     Types: Cigarettes     Last attempt to quit: 1981     Years since quittin.4    Smokeless tobacco: Never Used   Substance Use Topics    Alcohol use: Yes     Comment: occ. Counseling given: Not Answered      Vital Signs (Current):   Vitals:    19 0603   BP: 128/75   Pulse: 68   Resp: 16   Temp: 98.4 °F (36.9 °C)   TempSrc: Temporal   SpO2: 95%   Weight: 217 lb (98.4 kg)   Height: 5' 7\" (1.702 m)                                              BP Readings from Last 3 Encounters:   19 128/75   19 (!) 142/72   06/10/19 120/80       NPO Status: Time of last liquid consumption: 2200                        Time of last solid consumption: 1800                        Date of last liquid consumption: 19                        Date of last solid food consumption: 19    BMI:   Wt Readings from Last 3 Encounters:   19 217 lb (98.4 kg)   19 217 lb (98.4 kg)   06/10/19 220 lb 3.2 oz (99.9 kg)     Body mass index is 33.99 kg/m².     CBC:   Lab Results   Component Value Date    WBC 6.4 2019    RBC 4.67 2019    HGB 14.7 2019    HCT 42.6 2019    MCV 91.2 2019    RDW 13.8 06/06/2019     06/06/2019       CMP:   Lab Results   Component Value Date     06/06/2019    K 4.1 06/06/2019     06/06/2019    CO2 23 06/06/2019    BUN 15 06/06/2019    CREATININE 0.80 06/06/2019    GFRAA >60.0 06/06/2019    LABGLOM >60.0 06/06/2019    GLUCOSE 105 06/06/2019    GLUCOSE 94 12/10/2011    PROT 6.5 01/19/2019    CALCIUM 9.3 06/06/2019    BILITOT <0.2 01/19/2019    ALKPHOS 54 01/19/2019    AST 20 01/19/2019    ALT 33 01/19/2019       POC Tests: No results for input(s): POCGLU, POCNA, POCK, POCCL, POCBUN, POCHEMO, POCHCT in the last 72 hours. Coags:   Lab Results   Component Value Date    PROTIME 10.4 06/06/2019    INR 1.0 06/06/2019    APTT 26.6 05/01/2015       HCG (If Applicable): No results found for: PREGTESTUR, PREGSERUM, HCG, HCGQUANT     ABGs: No results found for: PHART, PO2ART, GKK5EAF, SCS0KNX, BEART, B1UEWDGK     Type & Screen (If Applicable):  No results found for: [facility]    Anesthesia Evaluation  Patient summary reviewed and Nursing notes reviewed no history of anesthetic complications:   Airway: Mallampati: II  TM distance: >3 FB   Neck ROM: full  Mouth opening: > = 3 FB Dental: normal exam         Pulmonary:Negative Pulmonary ROS and normal exam                               Cardiovascular:    (+) hypertension: no interval change,       ECG reviewed               Beta Blocker:  Dose within 24 Hrs         Neuro/Psych:   Negative Neuro/Psych ROS              GI/Hepatic/Renal:   (+) morbid obesity          Endo/Other:    (+) hypothyroidism, hyperthyroidism::., .          Pt had PAT visit. Abdominal:   (+) obese,         Vascular: negative vascular ROS. Anesthesia Plan      general     ASA 3       Induction: intravenous. MIPS: Postoperative opioids intended and Prophylactic antiemetics administered. Anesthetic plan and risks discussed with patient. Plan discussed with CRNA.     Attending anesthesiologist reviewed and agrees with Pre Eval content              Manolo Briones MD   6/19/2019

## 2019-06-19 NOTE — OP NOTE
This is Dr. Carmen Bence dictating an operative report on [employer] Adjutant FLORENTINO COOPER DO and preoperative diagnosis is degenerative joint disease of the left knee postoperative diagnosis same. Operation performed left total knee arthroplasty    Components used Loly LCS cemented femoral component standard cemented #3 tibial tray 10 mm standard rotating platform cemented standard patella button    Indication patient is a middle-aged white female complains of pain in the left knee. Was diagnosed as having degenerative arthritis. Patient failed conservative measures she was admitted this time for left total knee arthroplasty. Procedure possible complications had been discussed with the patient preoperatively. Patient was identified to holding area left lower extremity was marked. She was given antibiotics preoperatively on-call to the operating room suite underwent a block in the recovery area preop. Underwent spinal anesthetic. Description of operation patient was placed on the operating table after huddle had been performed left lower extremity was then prepped and draped in usual fashion after she had her spinal anesthetic. Leg was exsanguinated with an Esmarch dressing tourniquet applied to 300 mmHg. Anterior incision was made over the knee extending down through skin and subcutaneous tissue. Medial parapatellar incision was then made. Superficial vessels were cauterized. Severe changes were noted in all 3 compartments. Using an intramedullary and extramedullary guide a transverse cut was made into the tibia. Attention was then placed of the femur standard was noted to be the proper size. The jigs were used in successive fashion including the flexion-extension gaps were for tensioning the ligaments. Once all the cuts were made including the intramedullary 3 degree distal femoral cut femoral component was impacted into place was noted to be well fitting.   Attention was then placed the tibia with a

## 2019-06-19 NOTE — CONSULTS
symmetrical, trachea midline, no adenopathy, thyroid symmetric, not enlarged and no tenderness, skin normal  BACK:  Symmetric, no curvature, spinous processes are non-tender on palpation, paraspinous muscles are non-tender on palpation, no costal vertebral tenderness  LUNGS:  No increased work of breathing, good air exchange, clear to auscultation bilaterally, no crackles or wheezing  CARDIOVASCULAR:  Normal apical impulse, regular rate and rhythm, normal S1 and S2, no S3 or S4, and no murmur noted  ABDOMEN:  No scars, normal bowel sounds, soft, non-distended, non-tender, no masses palpated, no hepatosplenomegally  MUSCULOSKELETAL:  There is no redness, warmth, of the joints  NEUROLOGIC:  Awake, alert, oriented to name, place and time. Cranial nerves II-XII are grossly intact. SKIN:  no bruising or bleeding, no lesions and no abnormal moles    Labs:  Recent Results (from the past 24 hour(s))   TYPE AND SCREEN    Collection Time: 06/19/19  6:45 AM   Result Value Ref Range    ABO/Rh A POS     Antibody Screen NEG      Assessment/Plan:  1. S/p left TKA per Dr. Deyanira Abernathy  2. HTN-continue antihypertensive meds  3.  HLD  4. hypothyroidism    Electronically signed by LARON Flores on 6/19/19 at 3:36 PM

## 2019-06-19 NOTE — ANESTHESIA POSTPROCEDURE EVALUATION
Department of Anesthesiology  Postprocedure Note    Patient: Melvina Gresham  MRN: [id number]  YOB: 1955  Date of evaluation: 6/19/2019  Time:  9:51 AM     Procedure Summary     Date:  06/19/19 Room / Location:  [facility] OR 11 / [facility] OR    Anesthesia Start:  0730 Anesthesia Stop:      Procedure:  LEFT TOTAL KNEE REPLACEMENT- Celeste Meneses (Left Knee) Diagnosis:  (OSTEOARTHROSIS LEFT KNEE)    Surgeon:  Linda Caputo MD Responsible Provider:  Gus Lin MD    Anesthesia Type:  general ASA Status:  3          Anesthesia Type: general    Joseline Phase I:      Joseline Phase II:      Last vitals: Reviewed and per EMR flowsheets.        Anesthesia Post Evaluation    Patient location during evaluation: PACU  Level of consciousness: awake and alert  Pain score: 0  Airway patency: patent  Nausea & Vomiting: no vomiting and no nausea  Complications: no  Cardiovascular status: hemodynamically stable  Respiratory status: acceptable and room air  Hydration status: stable

## 2019-06-19 NOTE — ANESTHESIA PROCEDURE NOTES
Peripheral Block    Patient location during procedure: pre-op  Staffing  Anesthesiologist: Alex Amaya MD  Performed: anesthesiologist   Preanesthetic Checklist  Completed: patient identified, site marked, surgical consent, pre-op evaluation, timeout performed, IV checked, risks and benefits discussed, monitors and equipment checked, anesthesia consent given, oxygen available and patient being monitored  Peripheral Block  Patient position: supine  Prep: ChloraPrep  Patient monitoring: cardiac monitor, continuous pulse ox, frequent blood pressure checks and IV access  Block type: Saphenous  Laterality: left  Injection technique: single-shot  Procedures: ultrasound guided  Local infiltration: ropivacaine  Infiltration strength: 0.5 %  Dose: 30 mL  Provider prep: mask and sterile gloves  Local infiltration: ropivacaine  Needle  Needle type: combined needle/nerve stimulator   Needle gauge: 21 G  Needle length: 10 cm  Needle localization: ultrasound guidance  Test dose: negative  Assessment  Injection assessment: negative aspiration for heme, no paresthesia on injection and local visualized surrounding nerve on ultrasound  Paresthesia pain: none  Slow fractionated injection: yes  Hemodynamics: stable  Reason for block: post-op pain management

## 2019-06-19 NOTE — PROGRESS NOTES
[facility] OCCUPATIONAL THERAPY EVALUATION - ACUTE     Date: 2019  Patient Name: Sohan Leavitt        MRN: [id number]  Account: [de-identified]   : 1955  (62 y.o.)  Room: [place] [address]    Chart Review:  Diagnosis:  There were no encounter diagnoses. Past Medical History:   Diagnosis Date    Arthritis     both knees    Hyperlipidemia     meds > 10 yrs    Hypertension     meds > 4 yrs    Hyperthyroidism     Hypothyroidism     meds > 20 yrs    Thyroid goiter      Past Surgical History:   Procedure Laterality Date    APPENDECTOMY      with left oophorectomy    BREAST BIOPSY Left 2012    x 2 / both benign    CHOLECYSTECTOMY      COLONOSCOPY  07    COLONOSCOPY  [id number]    Cecile Carreon (polyps)    COLONOSCOPY  16    w/polypectomy     COLONOSCOPY N/A 2019    COLORECTAL CANCER SCREENING, HIGH RISK performed by Jeri Corral MD at [place] [address], [address] ARTHROSCOPY Bilateral 1987    left X2 and Right X1    TOTAL KNEE ARTHROPLASTY Left 2019    LEFT TOTAL KNEE REPLACEMENT- DEPUY performed by Alexandria Munoz MD at [address] [place] [address]  Restrictions/Precautions: Up as Tolerated, Fall Risk     Safety Devices: Safety Devices  Safety Devices in place: Yes  Type of devices:  All fall risk precautions in place    Subjective       Pain Reassessment:   Pain Assessment  Patient Currently in Pain: No  Pain Assessment: 0-10  Pain Level: 7  Pain Type: Surgical pain  Pain Location: Leg  Pain Orientation: Left       Orientation  Orientation  Overall Orientation Status: Within Functional Limits    Prior Level of Function:  Social/Functional History  Lives With: Spouse  Type of Home: House  Home Layout: One level  Home Access: Stairs to enter with rails  Entrance Stairs - Number of Steps: 3  Entrance Stairs - Rails: Right  Bathroom Shower/Tub: Tub/Shower unit  Bathroom Equipment: Shower chair, 3-in-1 commode  Home Equipment: Reacher, Sock aid, Long-handled shoehorn  ADL Assistance: Independent  Homemaking Assistance: Independent  Homemaking Responsibilities: Yes  Ambulation Assistance: Independent  Transfer Assistance: Independent  Active : Yes  Mode of Transportation: Car  Occupation: Full time employment  Type of occupation:  in [place]    OBJECTIVE:     Orientation Status:  Orientation  Overall Orientation Status: Within Functional Limits    Observation:  Observation/Palpation  Observation: alert, cooperative    Cognition Status:  Cognition  Overall Cognitive Status: WFL    Perception Status:  Perception  Overall Perceptual Status: WFL    Sensation Status:  Sensation  Overall Sensation Status: WFL    Vision and Hearing Status:  Vision  Vision: Impaired  Vision Exceptions: Wears glasses at all times  Hearing  Hearing: Within functional limits     ROM:   LUE AROM (degrees)  LUE AROM : WFL  Left Hand AROM (degrees)  Left Hand AROM: WFL  RUE AROM (degrees)  RUE AROM : WFL  Right Hand AROM (degrees)  Right Hand AROM: WFL    Strength:  LUE Strength  Gross LUE Strength: WFL  RUE Strength  Gross RUE Strength: WFL    Coordination, Tone, Quality of Movement:    Tone RUE  RUE Tone: Normotonic  Tone LUE  LUE Tone: Normotonic  Coordination  Movements Are Fluid And Coordinated: Yes    Hand Dominance:  Hand Dominance  Hand Dominance: Right    ADL Status:  ADL  Feeding: Independent  Grooming: Setup  UE Bathing: Setup  LE Bathing: Maximum assistance  UE Dressing: Setup  LE Dressing: Maximum assistance  Toileting: Maximum assistance          Functional Mobility:     Transfers  Sit to stand: Stand by assistance  Stand to sit: Stand by assistance  Transfer Comments: min verb cues for hand placement    Bed Mobility  Bed mobility  Supine to Sit: Minimal assistance    Seated and Standing Balance:  Balance  Sitting Balance: Modified independent   Standing Balance: Stand by assistance    Functional Endurance:  Activity Tolerance  Activity Tolerance: Patient Tolerated treatment well    D/C Recommendations:  therapy    Equipment Recommendations: has needed AD      OT Follow Up:  OT D/C RECOMMENDATIONS  REQUIRES OT FOLLOW UP: Yes       Assessment/Discharge Disposition:  Assessment: Pt is a 61 y/o f admitted for TKR. Pt found to have above deficits and would benefit from therapy  Performance deficits / Impairments: Decreased functional mobility , Decreased strength, Decreased endurance, Decreased ADL status, Decreased safe awareness, Decreased ROM, Decreased balance  Prognosis: Good  Discharge Recommendations: Continue to assess pending progress  History: multi comorb  Exam: max A    Six Click Score   How much help for putting on and taking off regular lower body clothing?: A Lot  How much help for Bathing?: A Lot  How much help for Toileting?: A Little  How much help for putting on and taking off regular upper body clothing?: A Little  How much help for taking care of personal grooming?: A Little  How much help for eating meals?: None  AM-[facility] Inpatient Daily Activity Raw Score: 17  AM-PAC Inpatient ADL T-Scale Score : 37.26  ADL Inpatient CMS 0-100% Score: 50.11    Plan:  Plan  Times per week: 1-3x  Plan weeks: acute LOS  Current Treatment Recommendations: Strengthening, Functional Mobility Training, Patient/Caregiver Education & Training, ROM, Endurance Training, Equipment Evaluation, Education, & procurement, Balance Training, Neuromuscular Re-education, Safety Education & Training, Self-Care / ADL    Goals:   Patient will:    - Improve functional endurance to tolerate/complete 30 mins of ADL's  - Be Ind in UB ADLs   - Be Mod I in LB ADLs  - Be Mod I in ADL transfers without LOB  - Be Ind in toileting tasks  - Improve B UE strength and endurance to [facility] in order to participate in self-care activities as projected. - Access appropriate D/C site with as few architectural barriers as possible.     Patient Goal:

## 2019-06-20 LAB
HCT VFR BLD CALC: 34.8 % (ref 37–47)
HEMOGLOBIN: 12 G/DL (ref 12–16)
MCH RBC QN AUTO: 31.4 PG (ref 27–31.3)
MCHC RBC AUTO-ENTMCNC: 34.5 % (ref 33–37)
MCV RBC AUTO: 91.1 FL (ref 82–100)
PDW BLD-RTO: 13.7 % (ref 11.5–14.5)
PLATELET # BLD: 188 K/UL (ref 130–400)
RBC # BLD: 3.82 M/UL (ref 4.2–5.4)
WBC # BLD: 12.4 K/UL (ref 4.8–10.8)

## 2019-06-20 PROCEDURE — 97535 SELF CARE MNGMENT TRAINING: CPT

## 2019-06-20 PROCEDURE — 96376 TX/PRO/DX INJ SAME DRUG ADON: CPT

## 2019-06-20 PROCEDURE — 6370000000 HC RX 637 (ALT 250 FOR IP): Performed by: PHYSICIAN ASSISTANT

## 2019-06-20 PROCEDURE — 97116 GAIT TRAINING THERAPY: CPT

## 2019-06-20 PROCEDURE — 36415 COLL VENOUS BLD VENIPUNCTURE: CPT

## 2019-06-20 PROCEDURE — G0378 HOSPITAL OBSERVATION PER HR: HCPCS

## 2019-06-20 PROCEDURE — 85027 COMPLETE CBC AUTOMATED: CPT

## 2019-06-20 PROCEDURE — 96374 THER/PROPH/DIAG INJ IV PUSH: CPT

## 2019-06-20 PROCEDURE — 1210000000 HC MED SURG R&B

## 2019-06-20 PROCEDURE — 2580000003 HC RX 258: Performed by: ORTHOPAEDIC SURGERY

## 2019-06-20 PROCEDURE — 6370000000 HC RX 637 (ALT 250 FOR IP): Performed by: ORTHOPAEDIC SURGERY

## 2019-06-20 PROCEDURE — 6360000002 HC RX W HCPCS: Performed by: ORTHOPAEDIC SURGERY

## 2019-06-20 RX ORDER — IBUPROFEN 800 MG/1
800 TABLET ORAL
Qty: 120 TABLET | Refills: 3 | Status: ON HOLD | OUTPATIENT
Start: 2019-06-20 | End: 2019-12-23 | Stop reason: HOSPADM

## 2019-06-20 RX ORDER — OXYCODONE HYDROCHLORIDE 5 MG/1
5 TABLET ORAL EVERY 4 HOURS PRN
Status: DISCONTINUED | OUTPATIENT
Start: 2019-06-20 | End: 2019-06-21 | Stop reason: HOSPADM

## 2019-06-20 RX ORDER — OXYCODONE HYDROCHLORIDE 5 MG/1
5 TABLET ORAL EVERY 4 HOURS PRN
Qty: 28 TABLET | Refills: 0 | Status: SHIPPED | OUTPATIENT
Start: 2019-06-20 | End: 2019-06-23

## 2019-06-20 RX ORDER — PRAVASTATIN SODIUM 40 MG
40 TABLET ORAL NIGHTLY
Status: DISCONTINUED | OUTPATIENT
Start: 2019-06-20 | End: 2019-06-21 | Stop reason: HOSPADM

## 2019-06-20 RX ORDER — OXYCODONE HYDROCHLORIDE 5 MG/1
10 TABLET ORAL EVERY 4 HOURS PRN
Status: DISCONTINUED | OUTPATIENT
Start: 2019-06-20 | End: 2019-06-21 | Stop reason: HOSPADM

## 2019-06-20 RX ORDER — IBUPROFEN 800 MG/1
800 TABLET ORAL
Status: DISCONTINUED | OUTPATIENT
Start: 2019-06-20 | End: 2019-06-21 | Stop reason: HOSPADM

## 2019-06-20 RX ADMIN — ACETAMINOPHEN 650 MG: 325 TABLET ORAL at 05:21

## 2019-06-20 RX ADMIN — KETOROLAC TROMETHAMINE 15 MG: 15 INJECTION, SOLUTION INTRAMUSCULAR; INTRAVENOUS at 03:44

## 2019-06-20 RX ADMIN — ACETAMINOPHEN 650 MG: 325 TABLET ORAL at 09:18

## 2019-06-20 RX ADMIN — OXYCODONE HYDROCHLORIDE 5 MG: 5 TABLET ORAL at 01:16

## 2019-06-20 RX ADMIN — KETOROLAC TROMETHAMINE 15 MG: 15 INJECTION, SOLUTION INTRAMUSCULAR; INTRAVENOUS at 09:18

## 2019-06-20 RX ADMIN — OXYCODONE HYDROCHLORIDE 5 MG: 5 TABLET ORAL at 21:42

## 2019-06-20 RX ADMIN — IBUPROFEN 800 MG: 800 TABLET, FILM COATED ORAL at 17:40

## 2019-06-20 RX ADMIN — METOPROLOL TARTRATE 25 MG: 25 TABLET ORAL at 21:12

## 2019-06-20 RX ADMIN — LEVOTHYROXINE SODIUM 150 MCG: 150 TABLET ORAL at 12:13

## 2019-06-20 RX ADMIN — ASPIRIN 325 MG: 325 TABLET, DELAYED RELEASE ORAL at 09:18

## 2019-06-20 RX ADMIN — DOCUSATE SODIUM 100 MG: 100 CAPSULE, LIQUID FILLED ORAL at 12:17

## 2019-06-20 RX ADMIN — OXYCODONE HYDROCHLORIDE 5 MG: 5 TABLET ORAL at 05:21

## 2019-06-20 RX ADMIN — OXYCODONE HYDROCHLORIDE 5 MG: 5 TABLET ORAL at 09:19

## 2019-06-20 RX ADMIN — DOCUSATE SODIUM 100 MG: 100 CAPSULE, LIQUID FILLED ORAL at 21:12

## 2019-06-20 RX ADMIN — OXYCODONE HYDROCHLORIDE 5 MG: 5 TABLET ORAL at 17:40

## 2019-06-20 RX ADMIN — OXYCODONE HYDROCHLORIDE 10 MG: 5 TABLET ORAL at 13:24

## 2019-06-20 RX ADMIN — Medication 10 ML: at 09:20

## 2019-06-20 RX ADMIN — Medication 10 ML: at 21:12

## 2019-06-20 RX ADMIN — BISACODYL 5 MG: 5 TABLET, COATED ORAL at 09:18

## 2019-06-20 RX ADMIN — I-VITE, TAB 1000-60-2MG (60/BT) 1 TABLET: TAB at 09:17

## 2019-06-20 RX ADMIN — PRAVASTATIN SODIUM 40 MG: 40 TABLET ORAL at 21:12

## 2019-06-20 ASSESSMENT — PAIN SCALES - GENERAL
PAINLEVEL_OUTOF10: 7
PAINLEVEL_OUTOF10: 5
PAINLEVEL_OUTOF10: 6
PAINLEVEL_OUTOF10: 8
PAINLEVEL_OUTOF10: 5
PAINLEVEL_OUTOF10: 7
PAINLEVEL_OUTOF10: 10
PAINLEVEL_OUTOF10: 5
PAINLEVEL_OUTOF10: 5

## 2019-06-20 ASSESSMENT — PAIN DESCRIPTION - DESCRIPTORS: DESCRIPTORS: ACHING

## 2019-06-20 ASSESSMENT — PAIN DESCRIPTION - PAIN TYPE
TYPE: SURGICAL PAIN

## 2019-06-20 ASSESSMENT — PAIN DESCRIPTION - LOCATION
LOCATION: KNEE

## 2019-06-20 ASSESSMENT — PAIN DESCRIPTION - FREQUENCY: FREQUENCY: INTERMITTENT

## 2019-06-20 ASSESSMENT — PAIN DESCRIPTION - ORIENTATION
ORIENTATION: LEFT

## 2019-06-20 NOTE — CARE COORDINATION
DAYLINW met with Pt and spouse, plan is home with [facility]. DAYLINW did give Pt freedom of choice for [facility] and Pt selected [facility]. Pt works at [employer]. Pt has a walker. .Electronically signed by ILEANA Noriega on 6/20/2019 at 8:44 AM

## 2019-06-20 NOTE — PROGRESS NOTES
Hospitalist Progress Note      Date of Admission: 6/19/2019  Chief Complaint:    No chief complaint on file. Subjective:  No new complaints. No nausea, vomiting, chest pain, or headache      Medications:    Infusion Medications    dextrose 5 % and 0.45 % NaCl 125 mL/hr at 06/19/19 1207     Scheduled Medications    [START ON 6/21/2019] aspirin  325 mg Oral Daily    ibuprofen  800 mg Oral TID WC    ocuvite-lutein  1 tablet Oral Daily    levothyroxine  150 mcg Oral Daily    metoprolol tartrate  25 mg Oral BID    estradiol  1 patch Transdermal Once per day on Mon Thu    clobetasol   Topical BID    clotrimazole-betamethasone   Topical BID    sodium chloride flush  10 mL Intravenous 2 times per day    docusate sodium  100 mg Oral BID    bisacodyl  5 mg Oral Daily     PRN Meds: oxyCODONE **OR** oxyCODONE, sodium chloride flush, ondansetron, morphine, morphine    Intake/Output Summary (Last 24 hours) at 6/20/2019 1542  Last data filed at 6/20/2019 0612  Gross per 24 hour   Intake --   Output 750 ml   Net -750 ml     Exam:  BP (!) 141/70   Pulse 72   Temp 98.2 °F (36.8 °C) (Oral)   Resp 16   Ht 5' 7\" (1.702 m)   Wt 217 lb (98.4 kg)   LMP 09/27/1998 (Approximate)   SpO2 99%   BMI 33.99 kg/m²   Head: Normocephalic, atraumatic  Sclera clear  Neck supple, nontender  Lungs: claer    Labs:   Recent Labs     06/20/19  0535   WBC 12.4*   HGB 12.0   HCT 34.8*        No results for input(s): NA, K, CL, CO2, BUN, CREATININE, CALCIUM, PHOS, AST, ALT, BILIDIR, BILITOT, ALKPHOS in the last 72 hours. Invalid input(s): MANASA  No results for input(s): INR in the last 72 hours. No results for input(s): Isreal Triana in the last 72 hours. Radiology:  No orders to display     Assessment/Plan:    Htn: monitor BP.  439P systolic noted. No changes at this time. Will cont to monitor.      25 minutes total care time, >1/2 in unit/floor time and care coordination       Electronically signed by Mark Andino Natalie Last MD on 6/20/2019 at 3:42 PM

## 2019-06-20 NOTE — PROGRESS NOTES
[facility] OCCUPATIONAL THERAPY ORTHOPEDIC TREATMENT NOTE     Date: 2019  Patient Name: Sandy Viera        MRN: [id number]  Account: [de-identified]   : 1955  (62 y.o.)  Room: [place] [address]    Chart Review:  Diagnosis:  There were no encounter diagnoses. Restrictions:    Restrictions/Precautions  Restrictions/Precautions: Up as Tolerated, Fall Risk         Subjective:  Patient states: \"Oh it does hurt. \"  Pain:  Start of tx:6/10       End of tx:6/10  Pain Assessment  Patient Currently in Pain: No  Pain Assessment: 0-10  Pain Level: 7  Pain Type: Surgical pain  Pain Location: Leg  Pain Orientation: Left    Objective:  ADL  ADL  Feeding: Independent  Grooming: Independent  UE Bathing: Supervision  LE Bathing: Supervision  UE Dressing: Setup  LE Dressing: Minimal assistance  Toileting: Modified independent   Toilet Transfers  Toilet - Technique: Ambulating  Equipment Used: Raised toilet seat with rails  Toilet Transfer: Supervision     Shower Transfers  Shower - Transfer Type: To and From  Shower - Transfer To:  Transfer tub bench  Shower - Technique: Ambulating  Shower Transfers: Supervision    LINCOLN Hose donned:  [x]  Yes  []  No    If no - why       Treatment consisted of:   [x] ADL Training  [] Strengthening   [] Transfer Training    [] DME Education  [] HEP   [] Manual Therapy   [] Patient Education  [] Other:      Assessment/Discharge Disposition:  Assessment: Pt making gains to increase independence with adl self care  Performance deficits / Impairments: Decreased functional mobility , Decreased strength, Decreased endurance, Decreased ADL status, Decreased safe awareness, Decreased ROM, Decreased balance  Prognosis: Good  Discharge Recommendations: Continue to assess pending progress  History: multi comorb  Exam: max A    SixClick    AM-PAC Daily Activity Inpatient   How much help for putting on and taking off regular lower body clothing?: A Lot  How much help for Bathing?: A Lot  How much help for Toileting?: A Little  How much help for putting on and taking off regular upper body clothing?: A Little  How much help for taking care of personal grooming?: A Little  How much help for eating meals?: None  AM-PAC Inpatient Daily Activity Raw Score: 17  AM-PAC Inpatient ADL T-Scale Score : 37.26  ADL Inpatient CMS 0-100% Score: 50.11  ADL Inpatient CMS G-Code Modifier : CK    Plan:  [x] Continue OT per POC  [] D/C OT  [] Other:     Goals/Plan of care addressed during this session:   [x] Improve balance  [] Improve Strength   [x] Improve [place] with functional transfers   [x]  Improve [place] with ADLs     Minutes:  OT Individual Minutes  Time In: 4687  Time Out: 6743  Minutes: 40      Electronically signed by:    JESUS Chacon  Electronically signed by JESUS Chacon on 3/51/11 at 10:41 AM    6/20/2019, 10:39 AM

## 2019-06-20 NOTE — DISCHARGE INSTR - COC
Continuity of Care Form    Patient Name: Heather Portillo   :  1955  MRN:  [id number]    [facility] date:  2019  Discharge date:  2019    Code Status Order: Full Code   Advance Directives:   Advance Care Flowsheet Documentation     Date/Time Healthcare Directive Type of Healthcare Directive Copy in [address] Agent's Name Healthcare Agent's Phone Number    [phone]  Yes, patient has an advance directive for healthcare treatment  --  --  --  --  --          Admitting Physician:  Evon Molina MD  PCP: Ever Kruger MD    Discharging Nurse: [facility] L[facility] Unit/Room#: M869/D052-53  Discharging Unit Phone Number: [phone]    Emergency Contact:   Extended Emergency Contact Information  Primary Emergency Contact: Mikhail Gonzalez  Address: [address] Phone: [phone]  Work Phone: [phone]  Mobile Phone: [phone]  Relation: Spouse    Past Surgical History:  Past Surgical History:   Procedure Laterality Date    [address]    with left oophorectomy    BREAST BIOPSY Left 2012    x 2 / both benign    CHOLECYSTECTOMY      COLONOSCOPY  07   Elverna Teresa COLONOSCOPY  [id number]    Precious Carreon (polyps)    COLONOSCOPY  16    w/polypectomy     COLONOSCOPY N/A 2019    COLORECTAL CANCER SCREENING, HIGH RISK performed by Iman Dickson MD at [facility], [address] ARTHROSCOPY Bilateral 1987    left X2 and Right X1    TOTAL KNEE ARTHROPLASTY Left 2019    LEFT TOTAL KNEE REPLACEMENT- DEPUY performed by Evon Molina MD at [facility]       Immunization History:   Immunization History   Administered Date(s) Administered    Influenza Virus Vaccine 10/04/2018       Active Problems:  Patient Active Problem List   Diagnosis Code    Hypothyroidism E03.9    HTN (hypertension) I10   

## 2019-06-20 NOTE — PROGRESS NOTES
Postop day 1 patient doing well at the present time. Tolerating p.o.  Intake  Tolerating physical therapy    A Febrile vital signs stable   vascular and neurological status intact in the left lower extremity   grossly: wound stable    labs stable  Plan continue with physical therapy today  We will stop the Toradol start on ibuprofen 800 mg 3 times daily with food  Decrease aspirin 325 p.o. daily instead of twice daily  Increase the oxycodone to 1-2 every 4-6 hours  If continues to improve we will discharge home tomorrow  Dr. Bronwyn Ramos

## 2019-06-20 NOTE — PROGRESS NOTES
Assessment:  Pre Treatment Pain Screening  Pain at present: 6  Scale Used: Numeric Score  Intervention List: Patient able to continue with treatment  Pain Screening  Patient Currently in Pain: Yes       Post Pain Assessment:   Pain Assessment  Pain Assessment: 0-10  Pain Level: 8  Pain Type: Surgical pain  Pain Location: Knee  Pain Orientation: Left       OBJECTIVE:         Bed mobility  Sit to Supine: Minimal assistance(assist lifting LLE into bed. )    Transfers  Sit to Stand: Stand by assistance  Stand to sit: Stand by assistance  Car Transfer: Minimal Assistance(assist to lift LLE into car. )  Comment: vc's for kicking LLE out during STS otherwise good technique. Ambulation  Ambulation?: Yes  Ambulation 1  Surface: level tile  Device: Rolling Walker  Assistance: Stand by assistance  Quality of Gait: antalgic gait, step-to pattern  Distance: 28'   Comments: distance not the focus of treatment. Stairs/Curb  Stairs?: Yes  Stairs  # Steps : 6  Stairs Height: 6\"  Rails: Right ascending  Device: Single pt cane  Assistance: Stand by assistance  Comment: increased time to complete but pt safe with SPC and R rail, pt reports she has a friend who will give her a cane when she gets home. ASSESSMENT:  Body structures, Functions, Activity limitations: Decreased functional mobility ; Decreased strength;Decreased balance;Decreased ROM; Increased Pain    Assessment: pt tearful and emotional during tx but has good mobility, safe on stairs and car, HEP will be introduced to pt in next session. Pt left with ice pack on L knee with instruction to remove after 15 minutes.      Activity Tolerance  Activity Tolerance: Patient Tolerated treatment well       Discharge Recommendations:  Continue to assess pending progress, Patient would benefit from continued therapy after discharge    Goals:  Long term goals  Long term goal 1: indep with bed mobility  Long term goal 2: indep with transfers  Long term

## 2019-06-20 NOTE — PROGRESS NOTES
Physical Therapy Med Surg Daily Treatment Note  Facility/Department: [facility]  Room: N223/N223-01       NAME: Matt Whipple  : 1955 (62 y.o.)  MRN: [id number]  CODE STATUS: Full Code    Date of Service: 2019    Patient Diagnosis(es): Arthritis of knee [M17.10]   No chief complaint on file. Patient Active Problem List    Diagnosis Date Noted    Arthritis of knee 2019    Osteoarthritis of left knee 2019    Kidney stone on left side 2019    Adenomatous polyp of ascending colon     Obesity 2014    Hyperlipidemia     Hypothyroidism     Thyroid goiter     HTN (hypertension) 10/01/2012    Hypothyroidism 2012        Past Medical History:   Diagnosis Date    Arthritis     both knees    Hyperlipidemia     meds > 10 yrs    Hypertension     meds > 4 yrs    Hyperthyroidism     Hypothyroidism     meds > 20 yrs    Thyroid goiter      Past Surgical History:   Procedure Laterality Date    [address]    with left oophorectomy    BREAST BIOPSY Left 2012    x 2 / both benign    CHOLECYSTECTOMY      COLONOSCOPY  07    COLONOSCOPY  [id number]    Mary Carreon (polyps)    COLONOSCOPY  16    w/polypectomy     COLONOSCOPY N/A 2019    COLORECTAL CANCER SCREENING, HIGH RISK performed by Albaro Pal MD at [facility] [address], [address] ARTHROSCOPY Bilateral 1987    left X2 and Right X1    TOTAL KNEE ARTHROPLASTY Left 2019    LEFT TOTAL KNEE REPLACEMENT- DEPUY performed by Deana Bonilla MD at [facility]          Restrictions:  Restrictions/Precautions: Weight Bearing, Fall Risk  Lower Extremity Weight Bearing Restrictions  Left Lower Extremity Weight Bearing: Weight Bearing As Tolerated    SUBJECTIVE:  General  Chart Reviewed: Yes  Family / Caregiver Present: Yes(spouse)  Subjective  Subjective: I still don't feel good.      Pre Pain Assessment:  Pre Treatment Pain Screening  Pain at present: 6  Scale Used: Numeric Score  Intervention List: Patient able to continue with treatment  Pain Screening  Patient Currently in Pain: Yes       Post Pain Assessment:   Pain Assessment  Pain Assessment: 0-10  Pain Level: 6  Pain Type: Surgical pain  Pain Location: Knee  Pain Orientation: Left       OBJECTIVE:             Transfers  Stand to sit: Stand by assistance      Ambulation  Ambulation?: Yes  Ambulation 1  Surface: level tile  Device: Rolling Walker  Assistance: Stand by assistance  Quality of Gait: antalgic gait, step-to pattern  Distance: 15'             Exercises  Knee Long Arc Quad: x 5   Ankle Pumps: x 20  Comments: pt given written HEP, instruction given on technique dosage and frequency of exercises, pt verbalizes understanding. ASSESSMENT:  Body structures, Functions, Activity limitations: Decreased functional mobility ; Decreased strength;Decreased balance;Decreased ROM; Increased Pain    Assessment: pt walking with RN at start of tx, pt returned to chair, attempts exercises, drain site begins to bleed, RN Jessi in to assist, this concluded treatment.      Activity Tolerance  Activity Tolerance: Patient Tolerated treatment well       Discharge Recommendations:  Continue to assess pending progress, Patient would benefit from continued therapy after discharge    Goals:  Long term goals  Long term goal 1: indep with bed mobility  Long term goal 2: indep with transfers  Long term goal 3: pt to ambulate >50 ft supervision with FWW  Long term goal 4: pt to navigate 12 steps with supervision  Patient Goals   Patient goals : to go home    PLAN:   Plan  Times per week: 5-7  Times per day: Twice a day  Current Treatment Recommendations: Strengthening, Functional Mobility Training, Neuromuscular Re-education, Home Exercise Program, Equipment Evaluation, Education, & procurement, Transfer Training, ROM, Gait Training, Manual Therapy - Soft Tissue Mobilization,

## 2019-06-21 VITALS
OXYGEN SATURATION: 98 % | BODY MASS INDEX: 34.06 KG/M2 | HEIGHT: 67 IN | HEART RATE: 92 BPM | WEIGHT: 217 LBS | RESPIRATION RATE: 18 BRPM | SYSTOLIC BLOOD PRESSURE: 145 MMHG | TEMPERATURE: 99.3 F | DIASTOLIC BLOOD PRESSURE: 60 MMHG

## 2019-06-21 PROCEDURE — G0378 HOSPITAL OBSERVATION PER HR: HCPCS

## 2019-06-21 PROCEDURE — 97110 THERAPEUTIC EXERCISES: CPT

## 2019-06-21 PROCEDURE — 97116 GAIT TRAINING THERAPY: CPT

## 2019-06-21 PROCEDURE — 6370000000 HC RX 637 (ALT 250 FOR IP): Performed by: ORTHOPAEDIC SURGERY

## 2019-06-21 PROCEDURE — 2580000003 HC RX 258: Performed by: ORTHOPAEDIC SURGERY

## 2019-06-21 RX ADMIN — I-VITE, TAB 1000-60-2MG (60/BT) 1 TABLET: TAB at 08:26

## 2019-06-21 RX ADMIN — ASPIRIN 325 MG: 325 TABLET, DELAYED RELEASE ORAL at 08:26

## 2019-06-21 RX ADMIN — Medication 10 ML: at 08:27

## 2019-06-21 RX ADMIN — DOCUSATE SODIUM 100 MG: 100 CAPSULE, LIQUID FILLED ORAL at 08:26

## 2019-06-21 RX ADMIN — OXYCODONE HYDROCHLORIDE 5 MG: 5 TABLET ORAL at 01:41

## 2019-06-21 RX ADMIN — OXYCODONE HYDROCHLORIDE 5 MG: 5 TABLET ORAL at 05:43

## 2019-06-21 RX ADMIN — IBUPROFEN 800 MG: 800 TABLET, FILM COATED ORAL at 08:27

## 2019-06-21 RX ADMIN — LEVOTHYROXINE SODIUM 150 MCG: 150 TABLET ORAL at 08:26

## 2019-06-21 RX ADMIN — IBUPROFEN 800 MG: 800 TABLET, FILM COATED ORAL at 11:40

## 2019-06-21 RX ADMIN — METOPROLOL TARTRATE 25 MG: 25 TABLET ORAL at 08:26

## 2019-06-21 RX ADMIN — OXYCODONE HYDROCHLORIDE 5 MG: 5 TABLET ORAL at 10:02

## 2019-06-21 RX ADMIN — BISACODYL 5 MG: 5 TABLET, COATED ORAL at 08:26

## 2019-06-21 ASSESSMENT — PAIN SCALES - GENERAL
PAINLEVEL_OUTOF10: 5
PAINLEVEL_OUTOF10: 7
PAINLEVEL_OUTOF10: 7
PAINLEVEL_OUTOF10: 5
PAINLEVEL_OUTOF10: 7
PAINLEVEL_OUTOF10: 4

## 2019-06-21 ASSESSMENT — PAIN DESCRIPTION - LOCATION: LOCATION: KNEE

## 2019-06-21 ASSESSMENT — PAIN DESCRIPTION - ORIENTATION: ORIENTATION: LEFT

## 2019-06-21 ASSESSMENT — PAIN DESCRIPTION - PAIN TYPE: TYPE: SURGICAL PAIN

## 2019-06-21 NOTE — FLOWSHEET NOTE
Discontinued Hemovac drain per Dr Calvin Shrestha request. Held pressure for 5minutes and applied folded 4x4s with silk tape, will continue to monitor.

## 2019-06-21 NOTE — PROGRESS NOTES
CLINICAL PHARMACY NOTE: MEDS TO [address] [place] [address] Drive Select Patient?: No  Total # of Prescriptions Filled: 3   The following medications were delivered to the patient:   ASA   Oxycodone   Ibuprofen    Total # of Interventions Completed: 3  Time Spent (min): 10    Additional Documentation:

## 2019-06-21 NOTE — FLOWSHEET NOTE
Patients dressing to hemovac site was saturated and dripping blood after therapy worked with her. Removed old dressing and re-applied 4x4s with silk tape. Bleeding to site stopped. Assisted patient to get back in bed, will continue to monitor.

## 2019-06-24 ENCOUNTER — CARE COORDINATION (OUTPATIENT)
Dept: CASE MANAGEMENT | Age: 64
End: 2019-06-24

## 2019-06-24 DIAGNOSIS — M17.12 OSTEOARTHRITIS OF LEFT KNEE, UNSPECIFIED OSTEOARTHRITIS TYPE: Primary | ICD-10-CM

## 2019-06-24 NOTE — PROGRESS NOTES
Physical Therapy  Facility/Department: [facility] RQYT R559/T766-37  Physical Therapy Discharge      NAME: Quique Mendez    : 1955 (62 y.o.)  MRN: [id number]    Account: [de-identified]  Gender: female      Patient has been discharged from acute care hospital. DC patient from current PT program.      Electronically signed by Arcelia Martinez PT on 19 at 1:05 PM

## 2019-06-24 NOTE — CARE COORDINATION
discharge instructions?:  Yes  Do you have all of your prescriptions and are they filled?:  Yes  Have you been contacted by a [address]?:  No  Have you scheduled your follow up appointment?:  Yes  How are you going to get to your appointment?:  Car - family or friend to transport (Comment: Spouse to drive)  Were you discharged with any Home Care or Post Acute Services:  Yes  Post Acute Services:  Home Health (Comment: [facility])  Patient DME:  Mihaela Sarabjit york  Do you have support at home?:  Partner/Spouse/SO  Are you an active caregiver in your home?:  No  Care Transitions Interventions         Follow Up  Future Appointments   Date Time Provider Fauzia Jensen   10/1/2019  1:00 PM Chanel Hollins DO [facility]   1/21/2020  5:15 PM Rome Bell  [facility]

## 2019-07-02 ENCOUNTER — CARE COORDINATION (OUTPATIENT)
Dept: CASE MANAGEMENT | Age: 64
End: 2019-07-02

## 2019-07-11 RX ORDER — BISOPROLOL FUMARATE 5 MG/1
5 TABLET ORAL DAILY
Qty: 90 TABLET | Refills: 1 | Status: SHIPPED | OUTPATIENT
Start: 2019-07-11 | End: 2020-01-21 | Stop reason: SDUPTHER

## 2019-08-05 RX ORDER — LEVOTHYROXINE SODIUM 0.15 MG/1
150 TABLET ORAL DAILY
Qty: 90 TABLET | Refills: 0 | Status: SHIPPED | OUTPATIENT
Start: 2019-08-05 | End: 2020-01-21 | Stop reason: SDUPTHER

## 2019-09-09 RX ORDER — PRAVASTATIN SODIUM 40 MG
TABLET ORAL
Qty: 90 TABLET | Refills: 0 | Status: SHIPPED | OUTPATIENT
Start: 2019-09-09 | End: 2019-12-26 | Stop reason: SDUPTHER

## 2019-10-29 ENCOUNTER — OFFICE VISIT (OUTPATIENT)
Dept: OBGYN CLINIC | Age: 64
End: 2019-10-29
Payer: COMMERCIAL

## 2019-10-29 VITALS
BODY MASS INDEX: 34.21 KG/M2 | WEIGHT: 218 LBS | HEIGHT: 67 IN | DIASTOLIC BLOOD PRESSURE: 78 MMHG | SYSTOLIC BLOOD PRESSURE: 130 MMHG

## 2019-10-29 DIAGNOSIS — Z12.72 ENCOUNTER FOR SCREENING FOR MALIGNANT NEOPLASM OF VAGINA: ICD-10-CM

## 2019-10-29 DIAGNOSIS — Z12.39 ENCOUNTER FOR SCREENING BREAST EXAMINATION: Primary | ICD-10-CM

## 2019-10-29 DIAGNOSIS — Z12.31 BREAST CANCER SCREENING BY MAMMOGRAM: ICD-10-CM

## 2019-10-29 PROCEDURE — 99396 PREV VISIT EST AGE 40-64: CPT | Performed by: OBSTETRICS & GYNECOLOGY

## 2019-10-29 ASSESSMENT — ENCOUNTER SYMPTOMS
CONSTIPATION: 0
COUGH: 0
ABDOMINAL DISTENTION: 0
TROUBLE SWALLOWING: 0
NAUSEA: 0
SORE THROAT: 0
WHEEZING: 0
BLOOD IN STOOL: 0
ABDOMINAL PAIN: 0
COLOR CHANGE: 0
VOMITING: 0
BACK PAIN: 0
SHORTNESS OF BREATH: 0
VOICE CHANGE: 0
CHEST TIGHTNESS: 0

## 2019-11-01 ENCOUNTER — HOSPITAL ENCOUNTER (OUTPATIENT)
Dept: WOMENS IMAGING | Age: 64
Discharge: HOME | End: 2019-11-03
Payer: COMMERCIAL

## 2019-11-01 DIAGNOSIS — Z12.31 BREAST CANCER SCREENING BY MAMMOGRAM: ICD-10-CM

## 2019-11-01 PROCEDURE — 77067 SCR MAMMO BI INCL CAD: CPT

## 2019-11-08 RX ORDER — LEVOTHYROXINE SODIUM 0.15 MG/1
TABLET ORAL
Qty: 90 TABLET | Refills: 0 | Status: SHIPPED | OUTPATIENT
Start: 2019-11-08 | End: 2019-12-20

## 2019-12-17 ENCOUNTER — HOSPITAL ENCOUNTER (OUTPATIENT)
Dept: CT IMAGING | Age: 64
Discharge: HOME | End: 2019-12-19
Payer: COMMERCIAL

## 2019-12-17 ENCOUNTER — HOSPITAL ENCOUNTER (OUTPATIENT)
Dept: GENERAL RADIOLOGY | Age: 64
Discharge: HOME | End: 2019-12-19
Payer: COMMERCIAL

## 2019-12-17 ENCOUNTER — TELEPHONE (OUTPATIENT)
Dept: UROLOGY | Age: 64
End: 2019-12-17

## 2019-12-17 DIAGNOSIS — N20.0 KIDNEY STONE ON LEFT SIDE: Primary | ICD-10-CM

## 2019-12-17 DIAGNOSIS — N20.0 KIDNEY STONE ON LEFT SIDE: ICD-10-CM

## 2019-12-17 PROCEDURE — 74018 RADEX ABDOMEN 1 VIEW: CPT

## 2019-12-17 PROCEDURE — 74176 CT ABD & PELVIS W/O CONTRAST: CPT

## 2019-12-17 RX ORDER — ONDANSETRON 4 MG/1
4 TABLET, FILM COATED ORAL EVERY 8 HOURS PRN
Qty: 15 TABLET | Refills: 0 | Status: SHIPPED | OUTPATIENT
Start: 2019-12-17 | End: 2020-01-21

## 2019-12-17 RX ORDER — TAMSULOSIN HYDROCHLORIDE 0.4 MG/1
0.4 CAPSULE ORAL DAILY
Qty: 30 CAPSULE | Refills: 0 | Status: SHIPPED | OUTPATIENT
Start: 2019-12-17 | End: 2020-01-21 | Stop reason: ALTCHOICE

## 2019-12-17 RX ORDER — OXYCODONE HYDROCHLORIDE AND ACETAMINOPHEN 5; 325 MG/1; MG/1
1 TABLET ORAL EVERY 6 HOURS PRN
Qty: 12 TABLET | Refills: 0 | Status: SHIPPED | OUTPATIENT
Start: 2019-12-17 | End: 2019-12-20

## 2019-12-18 ENCOUNTER — OFFICE VISIT (OUTPATIENT)
Dept: UROLOGY | Age: 64
End: 2019-12-18
Payer: COMMERCIAL

## 2019-12-18 VITALS
WEIGHT: 213 LBS | HEIGHT: 67 IN | BODY MASS INDEX: 33.43 KG/M2 | HEART RATE: 68 BPM | DIASTOLIC BLOOD PRESSURE: 80 MMHG | SYSTOLIC BLOOD PRESSURE: 126 MMHG

## 2019-12-18 DIAGNOSIS — N20.0 KIDNEY STONE ON LEFT SIDE: ICD-10-CM

## 2019-12-18 DIAGNOSIS — N20.0 KIDNEY STONE ON LEFT SIDE: Primary | ICD-10-CM

## 2019-12-18 LAB
ANION GAP SERPL CALCULATED.3IONS-SCNC: 11 MEQ/L (ref 9–15)
BILIRUBIN, POC: ABNORMAL
BLOOD URINE, POC: ABNORMAL
BUN BLDV-MCNC: 17 MG/DL (ref 8–23)
CALCIUM SERPL-MCNC: 9.5 MG/DL (ref 8.5–9.9)
CHLORIDE BLD-SCNC: 98 MEQ/L (ref 95–107)
CLARITY, POC: CLEAR
CO2: 25 MEQ/L (ref 20–31)
COLOR, POC: YELLOW
CREAT SERPL-MCNC: 1.07 MG/DL (ref 0.5–0.9)
GFR AFRICAN AMERICAN: >60
GFR NON-AFRICAN AMERICAN: 51.6
GLUCOSE BLD-MCNC: 100 MG/DL (ref 70–99)
GLUCOSE URINE, POC: ABNORMAL
HCT VFR BLD CALC: 41.7 % (ref 37–47)
HEMOGLOBIN: 14.1 G/DL (ref 12–16)
INR BLD: 1
KETONES, POC: ABNORMAL
LEUKOCYTE EST, POC: ABNORMAL
MCH RBC QN AUTO: 30.9 PG (ref 27–31.3)
MCHC RBC AUTO-ENTMCNC: 33.8 % (ref 33–37)
MCV RBC AUTO: 91.4 FL (ref 82–100)
NITRITE, POC: ABNORMAL
PDW BLD-RTO: 14.4 % (ref 11.5–14.5)
PH, POC: 5.5
PLATELET # BLD: 259 K/UL (ref 130–400)
POTASSIUM SERPL-SCNC: 4.4 MEQ/L (ref 3.4–4.9)
PROTEIN, POC: ABNORMAL
PROTHROMBIN TIME: 13.4 SEC (ref 12.3–14.9)
RBC # BLD: 4.56 M/UL (ref 4.2–5.4)
SODIUM BLD-SCNC: 134 MEQ/L (ref 135–144)
SPECIFIC GRAVITY, POC: 1.01
UROBILINOGEN, POC: 3.5
WBC # BLD: 7.7 K/UL (ref 4.8–10.8)

## 2019-12-18 PROCEDURE — 99214 OFFICE O/P EST MOD 30 MIN: CPT | Performed by: UROLOGY

## 2019-12-18 PROCEDURE — 81003 URINALYSIS AUTO W/O SCOPE: CPT | Performed by: UROLOGY

## 2019-12-18 ASSESSMENT — ENCOUNTER SYMPTOMS
ABDOMINAL PAIN: 1
SHORTNESS OF BREATH: 0
ABDOMINAL DISTENTION: 0

## 2019-12-20 ENCOUNTER — HOSPITAL ENCOUNTER (OUTPATIENT)
Dept: PREADMISSION TESTING | Age: 64
Discharge: HOME | End: 2019-12-24
Payer: COMMERCIAL

## 2019-12-20 VITALS
TEMPERATURE: 98 F | DIASTOLIC BLOOD PRESSURE: 79 MMHG | RESPIRATION RATE: 16 BRPM | OXYGEN SATURATION: 99 % | HEART RATE: 51 BPM | HEIGHT: 67 IN | WEIGHT: 215.8 LBS | BODY MASS INDEX: 33.87 KG/M2 | SYSTOLIC BLOOD PRESSURE: 154 MMHG

## 2019-12-20 LAB
EKG ATRIAL RATE: 53 BPM
EKG P AXIS: 28 DEGREES
EKG P-R INTERVAL: 144 MS
EKG Q-T INTERVAL: 438 MS
EKG QRS DURATION: 82 MS
EKG QTC CALCULATION (BAZETT): 410 MS
EKG R AXIS: 8 DEGREES
EKG T AXIS: 50 DEGREES
EKG VENTRICULAR RATE: 53 BPM

## 2019-12-20 RX ORDER — SODIUM CHLORIDE 0.9 % (FLUSH) 0.9 %
10 SYRINGE (ML) INJECTION EVERY 12 HOURS SCHEDULED
Status: CANCELLED | OUTPATIENT
Start: 2019-12-23

## 2019-12-20 RX ORDER — SODIUM CHLORIDE, SODIUM LACTATE, POTASSIUM CHLORIDE, CALCIUM CHLORIDE 600; 310; 30; 20 MG/100ML; MG/100ML; MG/100ML; MG/100ML
INJECTION, SOLUTION INTRAVENOUS CONTINUOUS
Status: CANCELLED | OUTPATIENT
Start: 2019-12-23

## 2019-12-20 RX ORDER — LIDOCAINE HYDROCHLORIDE 10 MG/ML
1 INJECTION, SOLUTION EPIDURAL; INFILTRATION; INTRACAUDAL; PERINEURAL
Status: CANCELLED | OUTPATIENT
Start: 2019-12-23 | End: 2019-12-23

## 2019-12-20 RX ORDER — SODIUM CHLORIDE 0.9 % (FLUSH) 0.9 %
10 SYRINGE (ML) INJECTION PRN
Status: CANCELLED | OUTPATIENT
Start: 2019-12-23

## 2019-12-23 ENCOUNTER — ANESTHESIA (OUTPATIENT)
Dept: OPERATING ROOM | Age: 64
End: 2019-12-23
Payer: COMMERCIAL

## 2019-12-23 ENCOUNTER — HOSPITAL ENCOUNTER (OUTPATIENT)
Age: 64
Setting detail: OUTPATIENT SURGERY
Discharge: HOME | End: 2019-12-23
Attending: UROLOGY | Admitting: UROLOGY
Payer: COMMERCIAL

## 2019-12-23 ENCOUNTER — ANESTHESIA EVENT (OUTPATIENT)
Dept: OPERATING ROOM | Age: 64
End: 2019-12-23
Payer: COMMERCIAL

## 2019-12-23 ENCOUNTER — APPOINTMENT (OUTPATIENT)
Dept: GENERAL RADIOLOGY | Age: 64
End: 2019-12-23
Attending: UROLOGY
Payer: COMMERCIAL

## 2019-12-23 VITALS
TEMPERATURE: 97.3 F | BODY MASS INDEX: 33.74 KG/M2 | HEIGHT: 67 IN | WEIGHT: 215 LBS | RESPIRATION RATE: 16 BRPM | HEART RATE: 68 BPM | SYSTOLIC BLOOD PRESSURE: 136 MMHG | OXYGEN SATURATION: 97 % | DIASTOLIC BLOOD PRESSURE: 63 MMHG

## 2019-12-23 VITALS — SYSTOLIC BLOOD PRESSURE: 104 MMHG | OXYGEN SATURATION: 99 % | TEMPERATURE: 92.7 F | DIASTOLIC BLOOD PRESSURE: 60 MMHG

## 2019-12-23 PROCEDURE — 6360000002 HC RX W HCPCS: Performed by: NURSE PRACTITIONER

## 2019-12-23 PROCEDURE — C1758 CATHETER, URETERAL: HCPCS | Performed by: UROLOGY

## 2019-12-23 PROCEDURE — 74018 RADEX ABDOMEN 1 VIEW: CPT

## 2019-12-23 PROCEDURE — 7100000010 HC PHASE II RECOVERY - FIRST 15 MIN: Performed by: UROLOGY

## 2019-12-23 PROCEDURE — 3600000004 HC SURGERY LEVEL 4 BASE: Performed by: UROLOGY

## 2019-12-23 PROCEDURE — 6360000002 HC RX W HCPCS: Performed by: NURSE ANESTHETIST, CERTIFIED REGISTERED

## 2019-12-23 PROCEDURE — 52330 CYSTOSCOPY AND TREATMENT: CPT | Performed by: UROLOGY

## 2019-12-23 PROCEDURE — 6360000004 HC RX CONTRAST MEDICATION: Performed by: UROLOGY

## 2019-12-23 PROCEDURE — 3600000014 HC SURGERY LEVEL 4 ADDTL 15MIN: Performed by: UROLOGY

## 2019-12-23 PROCEDURE — 52332 CYSTOSCOPY AND TREATMENT: CPT | Performed by: UROLOGY

## 2019-12-23 PROCEDURE — 2500000003 HC RX 250 WO HCPCS: Performed by: NURSE PRACTITIONER

## 2019-12-23 PROCEDURE — C2625 STENT, NON-COR, TEM W/DEL SY: HCPCS | Performed by: UROLOGY

## 2019-12-23 PROCEDURE — 2580000003 HC RX 258: Performed by: UROLOGY

## 2019-12-23 PROCEDURE — 2709999900 HC NON-CHARGEABLE SUPPLY: Performed by: UROLOGY

## 2019-12-23 PROCEDURE — 2500000003 HC RX 250 WO HCPCS: Performed by: NURSE ANESTHETIST, CERTIFIED REGISTERED

## 2019-12-23 PROCEDURE — 50590 FRAGMENTING OF KIDNEY STONE: CPT | Performed by: UROLOGY

## 2019-12-23 PROCEDURE — 7100000001 HC PACU RECOVERY - ADDTL 15 MIN: Performed by: UROLOGY

## 2019-12-23 PROCEDURE — C1769 GUIDE WIRE: HCPCS | Performed by: UROLOGY

## 2019-12-23 PROCEDURE — 2580000003 HC RX 258: Performed by: NURSE PRACTITIONER

## 2019-12-23 PROCEDURE — 3700000000 HC ANESTHESIA ATTENDED CARE: Performed by: UROLOGY

## 2019-12-23 PROCEDURE — 7100000011 HC PHASE II RECOVERY - ADDTL 15 MIN: Performed by: UROLOGY

## 2019-12-23 PROCEDURE — 3700000001 HC ADD 15 MINUTES (ANESTHESIA): Performed by: UROLOGY

## 2019-12-23 PROCEDURE — 2580000003 HC RX 258: Performed by: NURSE ANESTHETIST, CERTIFIED REGISTERED

## 2019-12-23 PROCEDURE — C2617 STENT, NON-COR, TEM W/O DEL: HCPCS

## 2019-12-23 PROCEDURE — 7100000000 HC PACU RECOVERY - FIRST 15 MIN: Performed by: UROLOGY

## 2019-12-23 DEVICE — STENT URET 47FR L26CM DBL PIGTAILS LUBRICIOUS COAT TAPR TIP: Type: IMPLANTABLE DEVICE | Site: URETER | Status: FUNCTIONAL

## 2019-12-23 RX ORDER — LIDOCAINE HYDROCHLORIDE 10 MG/ML
1 INJECTION, SOLUTION EPIDURAL; INFILTRATION; INTRACAUDAL; PERINEURAL
Status: COMPLETED | OUTPATIENT
Start: 2019-12-23 | End: 2019-12-23

## 2019-12-23 RX ORDER — SODIUM CHLORIDE 0.9 % (FLUSH) 0.9 %
10 SYRINGE (ML) INJECTION EVERY 12 HOURS SCHEDULED
Status: DISCONTINUED | OUTPATIENT
Start: 2019-12-23 | End: 2019-12-23 | Stop reason: HOSPADM

## 2019-12-23 RX ORDER — MAGNESIUM HYDROXIDE 1200 MG/15ML
LIQUID ORAL PRN
Status: DISCONTINUED | OUTPATIENT
Start: 2019-12-23 | End: 2019-12-23 | Stop reason: ALTCHOICE

## 2019-12-23 RX ORDER — DIPHENHYDRAMINE HYDROCHLORIDE 50 MG/ML
INJECTION INTRAMUSCULAR; INTRAVENOUS PRN
Status: DISCONTINUED | OUTPATIENT
Start: 2019-12-23 | End: 2019-12-23 | Stop reason: SDUPTHER

## 2019-12-23 RX ORDER — MIDAZOLAM HYDROCHLORIDE 1 MG/ML
INJECTION INTRAMUSCULAR; INTRAVENOUS PRN
Status: DISCONTINUED | OUTPATIENT
Start: 2019-12-23 | End: 2019-12-23 | Stop reason: SDUPTHER

## 2019-12-23 RX ORDER — ONDANSETRON 2 MG/ML
INJECTION INTRAMUSCULAR; INTRAVENOUS PRN
Status: DISCONTINUED | OUTPATIENT
Start: 2019-12-23 | End: 2019-12-23 | Stop reason: SDUPTHER

## 2019-12-23 RX ORDER — FENTANYL CITRATE 50 UG/ML
INJECTION, SOLUTION INTRAMUSCULAR; INTRAVENOUS PRN
Status: DISCONTINUED | OUTPATIENT
Start: 2019-12-23 | End: 2019-12-23 | Stop reason: SDUPTHER

## 2019-12-23 RX ORDER — ONDANSETRON 2 MG/ML
4 INJECTION INTRAMUSCULAR; INTRAVENOUS EVERY 6 HOURS PRN
Status: DISCONTINUED | OUTPATIENT
Start: 2019-12-23 | End: 2019-12-23 | Stop reason: HOSPADM

## 2019-12-23 RX ORDER — DEXAMETHASONE SODIUM PHOSPHATE 10 MG/ML
INJECTION INTRAMUSCULAR; INTRAVENOUS PRN
Status: DISCONTINUED | OUTPATIENT
Start: 2019-12-23 | End: 2019-12-23 | Stop reason: SDUPTHER

## 2019-12-23 RX ORDER — SODIUM CHLORIDE, SODIUM LACTATE, POTASSIUM CHLORIDE, CALCIUM CHLORIDE 600; 310; 30; 20 MG/100ML; MG/100ML; MG/100ML; MG/100ML
INJECTION, SOLUTION INTRAVENOUS CONTINUOUS PRN
Status: DISCONTINUED | OUTPATIENT
Start: 2019-12-23 | End: 2019-12-23 | Stop reason: SDUPTHER

## 2019-12-23 RX ORDER — LIDOCAINE HYDROCHLORIDE 20 MG/ML
INJECTION, SOLUTION INTRAVENOUS PRN
Status: DISCONTINUED | OUTPATIENT
Start: 2019-12-23 | End: 2019-12-23 | Stop reason: SDUPTHER

## 2019-12-23 RX ORDER — SODIUM CHLORIDE 0.9 % (FLUSH) 0.9 %
10 SYRINGE (ML) INJECTION PRN
Status: DISCONTINUED | OUTPATIENT
Start: 2019-12-23 | End: 2019-12-23 | Stop reason: HOSPADM

## 2019-12-23 RX ORDER — GLYCOPYRROLATE 1 MG/5 ML
SYRINGE (ML) INTRAVENOUS PRN
Status: DISCONTINUED | OUTPATIENT
Start: 2019-12-23 | End: 2019-12-23 | Stop reason: SDUPTHER

## 2019-12-23 RX ORDER — SODIUM CHLORIDE 9 MG/ML
INJECTION, SOLUTION INTRAVENOUS CONTINUOUS
Status: DISCONTINUED | OUTPATIENT
Start: 2019-12-23 | End: 2019-12-23 | Stop reason: HOSPADM

## 2019-12-23 RX ORDER — EPHEDRINE SULFATE/0.9% NACL/PF 50 MG/5 ML
SYRINGE (ML) INTRAVENOUS PRN
Status: DISCONTINUED | OUTPATIENT
Start: 2019-12-23 | End: 2019-12-23 | Stop reason: SDUPTHER

## 2019-12-23 RX ORDER — SODIUM CHLORIDE, SODIUM LACTATE, POTASSIUM CHLORIDE, CALCIUM CHLORIDE 600; 310; 30; 20 MG/100ML; MG/100ML; MG/100ML; MG/100ML
INJECTION, SOLUTION INTRAVENOUS CONTINUOUS
Status: DISCONTINUED | OUTPATIENT
Start: 2019-12-23 | End: 2019-12-23 | Stop reason: HOSPADM

## 2019-12-23 RX ORDER — PHENYLEPHRINE HYDROCHLORIDE 10 MG/ML
INJECTION INTRAVENOUS PRN
Status: DISCONTINUED | OUTPATIENT
Start: 2019-12-23 | End: 2019-12-23 | Stop reason: SDUPTHER

## 2019-12-23 RX ORDER — PROPOFOL 10 MG/ML
INJECTION, EMULSION INTRAVENOUS PRN
Status: DISCONTINUED | OUTPATIENT
Start: 2019-12-23 | End: 2019-12-23 | Stop reason: SDUPTHER

## 2019-12-23 RX ADMIN — SODIUM CHLORIDE, POTASSIUM CHLORIDE, SODIUM LACTATE AND CALCIUM CHLORIDE: 600; 310; 30; 20 INJECTION, SOLUTION INTRAVENOUS at 07:28

## 2019-12-23 RX ADMIN — PHENYLEPHRINE HYDROCHLORIDE 100 MCG: 10 INJECTION INTRAVENOUS at 07:59

## 2019-12-23 RX ADMIN — SODIUM CHLORIDE: 9 INJECTION, SOLUTION INTRAVENOUS at 06:40

## 2019-12-23 RX ADMIN — LIDOCAINE HYDROCHLORIDE 75 MG: 20 INJECTION, SOLUTION INTRAVENOUS at 07:41

## 2019-12-23 RX ADMIN — FENTANYL CITRATE 50 MCG: 50 INJECTION, SOLUTION INTRAMUSCULAR; INTRAVENOUS at 07:38

## 2019-12-23 RX ADMIN — CEFTRIAXONE SODIUM 1 G: 1 INJECTION, POWDER, FOR SOLUTION INTRAMUSCULAR; INTRAVENOUS at 07:37

## 2019-12-23 RX ADMIN — PROPOFOL 150 MG: 10 INJECTION, EMULSION INTRAVENOUS at 07:41

## 2019-12-23 RX ADMIN — PHENYLEPHRINE HYDROCHLORIDE 100 MCG: 10 INJECTION INTRAVENOUS at 08:31

## 2019-12-23 RX ADMIN — DIPHENHYDRAMINE HYDROCHLORIDE 10 MG: 50 INJECTION INTRAMUSCULAR; INTRAVENOUS at 07:56

## 2019-12-23 RX ADMIN — Medication 10 MG: at 08:39

## 2019-12-23 RX ADMIN — MIDAZOLAM HYDROCHLORIDE 2 MG: 2 INJECTION, SOLUTION INTRAMUSCULAR; INTRAVENOUS at 07:32

## 2019-12-23 RX ADMIN — Medication 0.2 MG: at 08:03

## 2019-12-23 RX ADMIN — PHENYLEPHRINE HYDROCHLORIDE 100 MCG: 10 INJECTION INTRAVENOUS at 08:24

## 2019-12-23 RX ADMIN — PHENYLEPHRINE HYDROCHLORIDE 50 MCG: 10 INJECTION INTRAVENOUS at 08:07

## 2019-12-23 RX ADMIN — LIDOCAINE HYDROCHLORIDE 1 ML: 10 INJECTION, SOLUTION EPIDURAL; INFILTRATION; INTRACAUDAL; PERINEURAL at 06:40

## 2019-12-23 RX ADMIN — FENTANYL CITRATE 25 MCG: 50 INJECTION, SOLUTION INTRAMUSCULAR; INTRAVENOUS at 07:50

## 2019-12-23 RX ADMIN — PHENYLEPHRINE HYDROCHLORIDE 50 MCG: 10 INJECTION INTRAVENOUS at 08:06

## 2019-12-23 RX ADMIN — SODIUM CHLORIDE, POTASSIUM CHLORIDE, SODIUM LACTATE AND CALCIUM CHLORIDE: 600; 310; 30; 20 INJECTION, SOLUTION INTRAVENOUS at 08:39

## 2019-12-23 RX ADMIN — ONDANSETRON 4 MG: 2 INJECTION INTRAMUSCULAR; INTRAVENOUS at 07:56

## 2019-12-23 RX ADMIN — FENTANYL CITRATE 25 MCG: 50 INJECTION, SOLUTION INTRAMUSCULAR; INTRAVENOUS at 09:01

## 2019-12-23 RX ADMIN — DEXAMETHASONE SODIUM PHOSPHATE 5 MG: 10 INJECTION INTRAMUSCULAR; INTRAVENOUS at 07:45

## 2019-12-23 ASSESSMENT — PULMONARY FUNCTION TESTS
PIF_VALUE: 3
PIF_VALUE: 0
PIF_VALUE: 3
PIF_VALUE: 17
PIF_VALUE: 3
PIF_VALUE: 3
PIF_VALUE: 17
PIF_VALUE: 3
PIF_VALUE: 0
PIF_VALUE: 3
PIF_VALUE: 3
PIF_VALUE: 1
PIF_VALUE: 3
PIF_VALUE: 8
PIF_VALUE: 3
PIF_VALUE: 3
PIF_VALUE: 0
PIF_VALUE: 4
PIF_VALUE: 3
PIF_VALUE: 4
PIF_VALUE: 3
PIF_VALUE: 2
PIF_VALUE: 3
PIF_VALUE: 3
PIF_VALUE: 0
PIF_VALUE: 1
PIF_VALUE: 26
PIF_VALUE: 3
PIF_VALUE: 2
PIF_VALUE: 17
PIF_VALUE: 3
PIF_VALUE: 5
PIF_VALUE: 3
PIF_VALUE: 19
PIF_VALUE: 3
PIF_VALUE: 21
PIF_VALUE: 3
PIF_VALUE: 3
PIF_VALUE: 1
PIF_VALUE: 3
PIF_VALUE: 0
PIF_VALUE: 3
PIF_VALUE: 2
PIF_VALUE: 4
PIF_VALUE: 4
PIF_VALUE: 3

## 2019-12-23 ASSESSMENT — PAIN SCALES - GENERAL: PAINLEVEL_OUTOF10: 0

## 2019-12-23 ASSESSMENT — PAIN - FUNCTIONAL ASSESSMENT: PAIN_FUNCTIONAL_ASSESSMENT: 0-10

## 2019-12-26 RX ORDER — PRAVASTATIN SODIUM 40 MG
TABLET ORAL
Qty: 90 TABLET | Refills: 0 | Status: SHIPPED | OUTPATIENT
Start: 2019-12-26 | End: 2020-03-20 | Stop reason: SDUPTHER

## 2019-12-30 ENCOUNTER — HOSPITAL ENCOUNTER (OUTPATIENT)
Dept: GENERAL RADIOLOGY | Age: 64
Discharge: HOME | End: 2020-01-01
Payer: COMMERCIAL

## 2019-12-30 ENCOUNTER — OFFICE VISIT (OUTPATIENT)
Dept: UROLOGY | Age: 64
End: 2019-12-30

## 2019-12-30 VITALS
HEIGHT: 67 IN | BODY MASS INDEX: 33.12 KG/M2 | DIASTOLIC BLOOD PRESSURE: 88 MMHG | WEIGHT: 211 LBS | HEART RATE: 70 BPM | SYSTOLIC BLOOD PRESSURE: 138 MMHG

## 2019-12-30 DIAGNOSIS — N20.0 LEFT RENAL STONE: Primary | ICD-10-CM

## 2019-12-30 PROCEDURE — 74018 RADEX ABDOMEN 1 VIEW: CPT

## 2019-12-30 PROCEDURE — 99024 POSTOP FOLLOW-UP VISIT: CPT | Performed by: UROLOGY

## 2019-12-30 RX ORDER — PHENAZOPYRIDINE HYDROCHLORIDE 200 MG/1
TABLET, FILM COATED ORAL
COMMUNITY
Start: 2019-12-23 | End: 2020-01-21

## 2019-12-30 ASSESSMENT — ENCOUNTER SYMPTOMS: ABDOMINAL PAIN: 0

## 2019-12-31 DIAGNOSIS — E03.9 HYPOTHYROIDISM, UNSPECIFIED TYPE: ICD-10-CM

## 2019-12-31 DIAGNOSIS — E78.5 HYPERLIPIDEMIA, UNSPECIFIED HYPERLIPIDEMIA TYPE: ICD-10-CM

## 2019-12-31 LAB
ANION GAP SERPL CALCULATED.3IONS-SCNC: 16 MEQ/L (ref 9–15)
BUN BLDV-MCNC: 13 MG/DL (ref 8–23)
CALCIUM SERPL-MCNC: 9.2 MG/DL (ref 8.5–9.9)
CHLORIDE BLD-SCNC: 104 MEQ/L (ref 95–107)
CHOLESTEROL, TOTAL: 172 MG/DL (ref 0–199)
CO2: 21 MEQ/L (ref 20–31)
CREAT SERPL-MCNC: 1.02 MG/DL (ref 0.5–0.9)
GFR AFRICAN AMERICAN: >60
GFR NON-AFRICAN AMERICAN: 54.6
GLUCOSE BLD-MCNC: 121 MG/DL (ref 70–99)
HDLC SERPL-MCNC: 32 MG/DL (ref 40–59)
LDL CHOLESTEROL CALCULATED: 86 MG/DL (ref 0–129)
POTASSIUM SERPL-SCNC: 3.9 MEQ/L (ref 3.4–4.9)
SODIUM BLD-SCNC: 141 MEQ/L (ref 135–144)
T4 FREE: 1.49 NG/DL (ref 0.84–1.68)
TRIGL SERPL-MCNC: 270 MG/DL (ref 0–150)
TSH SERPL DL<=0.05 MIU/L-ACNC: 1.9 UIU/ML (ref 0.44–3.86)

## 2020-01-01 LAB — URINE CULTURE, ROUTINE: NORMAL

## 2020-01-08 ENCOUNTER — ANESTHESIA EVENT (OUTPATIENT)
Dept: OPERATING ROOM | Age: 65
End: 2020-01-08
Payer: COMMERCIAL

## 2020-01-08 ENCOUNTER — APPOINTMENT (OUTPATIENT)
Dept: GENERAL RADIOLOGY | Age: 65
End: 2020-01-08
Attending: UROLOGY
Payer: COMMERCIAL

## 2020-01-08 ENCOUNTER — HOSPITAL ENCOUNTER (OUTPATIENT)
Age: 65
Setting detail: OUTPATIENT SURGERY
Discharge: HOME | End: 2020-01-08
Attending: UROLOGY | Admitting: UROLOGY
Payer: COMMERCIAL

## 2020-01-08 ENCOUNTER — ANESTHESIA (OUTPATIENT)
Dept: OPERATING ROOM | Age: 65
End: 2020-01-08
Payer: COMMERCIAL

## 2020-01-08 VITALS
WEIGHT: 211 LBS | OXYGEN SATURATION: 97 % | RESPIRATION RATE: 20 BRPM | HEART RATE: 56 BPM | DIASTOLIC BLOOD PRESSURE: 88 MMHG | TEMPERATURE: 98.5 F | BODY MASS INDEX: 33.12 KG/M2 | HEIGHT: 67 IN | SYSTOLIC BLOOD PRESSURE: 151 MMHG

## 2020-01-08 VITALS — OXYGEN SATURATION: 96 % | DIASTOLIC BLOOD PRESSURE: 58 MMHG | SYSTOLIC BLOOD PRESSURE: 113 MMHG | TEMPERATURE: 97.7 F

## 2020-01-08 PROCEDURE — 6360000002 HC RX W HCPCS: Performed by: UROLOGY

## 2020-01-08 PROCEDURE — 6370000000 HC RX 637 (ALT 250 FOR IP): Performed by: UROLOGY

## 2020-01-08 PROCEDURE — 2500000003 HC RX 250 WO HCPCS: Performed by: NURSE ANESTHETIST, CERTIFIED REGISTERED

## 2020-01-08 PROCEDURE — 3600000004 HC SURGERY LEVEL 4 BASE: Performed by: UROLOGY

## 2020-01-08 PROCEDURE — 7100000011 HC PHASE II RECOVERY - ADDTL 15 MIN: Performed by: UROLOGY

## 2020-01-08 PROCEDURE — 7100000010 HC PHASE II RECOVERY - FIRST 15 MIN: Performed by: UROLOGY

## 2020-01-08 PROCEDURE — 2580000003 HC RX 258: Performed by: UROLOGY

## 2020-01-08 PROCEDURE — 6360000002 HC RX W HCPCS: Performed by: NURSE ANESTHETIST, CERTIFIED REGISTERED

## 2020-01-08 PROCEDURE — 3700000000 HC ANESTHESIA ATTENDED CARE: Performed by: UROLOGY

## 2020-01-08 PROCEDURE — 52310 CYSTOSCOPY AND TREATMENT: CPT | Performed by: UROLOGY

## 2020-01-08 PROCEDURE — 2709999900 HC NON-CHARGEABLE SUPPLY: Performed by: UROLOGY

## 2020-01-08 PROCEDURE — 74018 RADEX ABDOMEN 1 VIEW: CPT

## 2020-01-08 RX ORDER — HYDROCODONE BITARTRATE AND ACETAMINOPHEN 5; 325 MG/1; MG/1
1 TABLET ORAL PRN
Status: DISCONTINUED | OUTPATIENT
Start: 2020-01-08 | End: 2020-01-08 | Stop reason: HOSPADM

## 2020-01-08 RX ORDER — METOCLOPRAMIDE HYDROCHLORIDE 5 MG/ML
10 INJECTION INTRAMUSCULAR; INTRAVENOUS
Status: DISCONTINUED | OUTPATIENT
Start: 2020-01-08 | End: 2020-01-08 | Stop reason: HOSPADM

## 2020-01-08 RX ORDER — LIDOCAINE HYDROCHLORIDE 10 MG/ML
1 INJECTION, SOLUTION EPIDURAL; INFILTRATION; INTRACAUDAL; PERINEURAL
Status: DISCONTINUED | OUTPATIENT
Start: 2020-01-08 | End: 2020-01-08 | Stop reason: HOSPADM

## 2020-01-08 RX ORDER — PROPOFOL 10 MG/ML
INJECTION, EMULSION INTRAVENOUS PRN
Status: DISCONTINUED | OUTPATIENT
Start: 2020-01-08 | End: 2020-01-08 | Stop reason: SDUPTHER

## 2020-01-08 RX ORDER — FENTANYL CITRATE 50 UG/ML
50 INJECTION, SOLUTION INTRAMUSCULAR; INTRAVENOUS EVERY 10 MIN PRN
Status: DISCONTINUED | OUTPATIENT
Start: 2020-01-08 | End: 2020-01-08 | Stop reason: HOSPADM

## 2020-01-08 RX ORDER — MEPERIDINE HYDROCHLORIDE 25 MG/ML
12.5 INJECTION INTRAMUSCULAR; INTRAVENOUS; SUBCUTANEOUS EVERY 5 MIN PRN
Status: DISCONTINUED | OUTPATIENT
Start: 2020-01-08 | End: 2020-01-08 | Stop reason: HOSPADM

## 2020-01-08 RX ORDER — SODIUM CHLORIDE 0.9 % (FLUSH) 0.9 %
10 SYRINGE (ML) INJECTION PRN
Status: DISCONTINUED | OUTPATIENT
Start: 2020-01-08 | End: 2020-01-08 | Stop reason: HOSPADM

## 2020-01-08 RX ORDER — SODIUM CHLORIDE 9 MG/ML
INJECTION, SOLUTION INTRAVENOUS CONTINUOUS
Status: DISCONTINUED | OUTPATIENT
Start: 2020-01-08 | End: 2020-01-08 | Stop reason: HOSPADM

## 2020-01-08 RX ORDER — SODIUM CHLORIDE 0.9 % (FLUSH) 0.9 %
10 SYRINGE (ML) INJECTION EVERY 12 HOURS SCHEDULED
Status: DISCONTINUED | OUTPATIENT
Start: 2020-01-08 | End: 2020-01-08 | Stop reason: HOSPADM

## 2020-01-08 RX ORDER — ONDANSETRON 2 MG/ML
4 INJECTION INTRAMUSCULAR; INTRAVENOUS
Status: DISCONTINUED | OUTPATIENT
Start: 2020-01-08 | End: 2020-01-08 | Stop reason: HOSPADM

## 2020-01-08 RX ORDER — CHLORHEXIDINE GLUCONATE 4 G/100ML
SOLUTION TOPICAL PRN
Status: DISCONTINUED | OUTPATIENT
Start: 2020-01-08 | End: 2020-01-08 | Stop reason: ALTCHOICE

## 2020-01-08 RX ORDER — HYDROCODONE BITARTRATE AND ACETAMINOPHEN 5; 325 MG/1; MG/1
2 TABLET ORAL PRN
Status: DISCONTINUED | OUTPATIENT
Start: 2020-01-08 | End: 2020-01-08 | Stop reason: HOSPADM

## 2020-01-08 RX ORDER — SODIUM CHLORIDE, SODIUM LACTATE, POTASSIUM CHLORIDE, CALCIUM CHLORIDE 600; 310; 30; 20 MG/100ML; MG/100ML; MG/100ML; MG/100ML
INJECTION, SOLUTION INTRAVENOUS CONTINUOUS
Status: DISCONTINUED | OUTPATIENT
Start: 2020-01-08 | End: 2020-01-08 | Stop reason: HOSPADM

## 2020-01-08 RX ORDER — MAGNESIUM HYDROXIDE 1200 MG/15ML
LIQUID ORAL PRN
Status: DISCONTINUED | OUTPATIENT
Start: 2020-01-08 | End: 2020-01-08 | Stop reason: ALTCHOICE

## 2020-01-08 RX ORDER — KETOROLAC TROMETHAMINE 30 MG/ML
INJECTION, SOLUTION INTRAMUSCULAR; INTRAVENOUS PRN
Status: DISCONTINUED | OUTPATIENT
Start: 2020-01-08 | End: 2020-01-08 | Stop reason: SDUPTHER

## 2020-01-08 RX ORDER — LIDOCAINE HYDROCHLORIDE 20 MG/ML
JELLY TOPICAL PRN
Status: DISCONTINUED | OUTPATIENT
Start: 2020-01-08 | End: 2020-01-08 | Stop reason: ALTCHOICE

## 2020-01-08 RX ORDER — LIDOCAINE HYDROCHLORIDE 10 MG/ML
INJECTION, SOLUTION INFILTRATION; PERINEURAL PRN
Status: DISCONTINUED | OUTPATIENT
Start: 2020-01-08 | End: 2020-01-08 | Stop reason: SDUPTHER

## 2020-01-08 RX ORDER — DIPHENHYDRAMINE HYDROCHLORIDE 50 MG/ML
12.5 INJECTION INTRAMUSCULAR; INTRAVENOUS
Status: DISCONTINUED | OUTPATIENT
Start: 2020-01-08 | End: 2020-01-08 | Stop reason: HOSPADM

## 2020-01-08 RX ORDER — ONDANSETRON 2 MG/ML
INJECTION INTRAMUSCULAR; INTRAVENOUS PRN
Status: DISCONTINUED | OUTPATIENT
Start: 2020-01-08 | End: 2020-01-08 | Stop reason: SDUPTHER

## 2020-01-08 RX ADMIN — KETOROLAC TROMETHAMINE 30 MG: 30 INJECTION, SOLUTION INTRAMUSCULAR at 08:10

## 2020-01-08 RX ADMIN — CEFTRIAXONE SODIUM 1 G: 1 INJECTION, POWDER, FOR SOLUTION INTRAMUSCULAR; INTRAVENOUS at 08:02

## 2020-01-08 RX ADMIN — SODIUM CHLORIDE: 9 INJECTION, SOLUTION INTRAVENOUS at 06:55

## 2020-01-08 RX ADMIN — ONDANSETRON 4 MG: 2 INJECTION INTRAMUSCULAR; INTRAVENOUS at 08:10

## 2020-01-08 RX ADMIN — PROPOFOL 150 MG: 10 INJECTION, EMULSION INTRAVENOUS at 08:05

## 2020-01-08 RX ADMIN — PROPOFOL 50 MG: 10 INJECTION, EMULSION INTRAVENOUS at 08:10

## 2020-01-08 RX ADMIN — LIDOCAINE HYDROCHLORIDE 50 MG: 10 INJECTION, SOLUTION INFILTRATION; PERINEURAL at 08:05

## 2020-01-08 ASSESSMENT — PULMONARY FUNCTION TESTS
PIF_VALUE: 0

## 2020-01-08 ASSESSMENT — PAIN - FUNCTIONAL ASSESSMENT: PAIN_FUNCTIONAL_ASSESSMENT: 0-10

## 2020-01-08 ASSESSMENT — PAIN DESCRIPTION - DESCRIPTORS: DESCRIPTORS: ACHING

## 2020-01-08 ASSESSMENT — PAIN SCALES - GENERAL: PAINLEVEL_OUTOF10: 0

## 2020-01-08 NOTE — ANESTHESIA PRE PROCEDURE
Department of Anesthesiology  Preprocedure Note       Name:  Beto Cantu   Age:  62 y.o.  :  1955                                          MRN:  [id number]         Date:  2020      Surgeon: María Malcolm):  Gildardo Santoro MD    Procedure: FLEXIBLE CYSTOSCOPY LEFT DOUBLE J STENT REMOVAL (RECENT SURGERY 19) KUB ON ARRIVAL (Left )    Medications prior to admission:   Prior to Admission medications    Medication Sig Start Date End Date Taking? Authorizing Provider   phenazopyridine (PYRIDIUM) 200 MG tablet  19  Yes Historical Provider, MD   pravastatin (PRAVACHOL) 40 MG tablet Take 1 tablet by mouth daily 19  Yes Shilpi Venegas MD   tamsulosin (FLOMAX) 0.4 MG capsule Take 1 capsule by mouth daily 19  Yes Lee Ann Martin MD   ondansetron (ZOFRAN) 4 MG tablet Take 1 tablet by mouth every 8 hours as needed for Nausea or Vomiting 19  Yes Lee Ann Martin MD   levothyroxine (SYNTHROID) 150 MCG tablet Take 1 tablet by mouth daily 19  Yes Shilpi Venegas MD   bisoprolol (ZEBETA) 5 MG tablet Take 1 tablet by mouth daily (replaces Bystolic)   Yes Shilpi Venegas MD   estradiol (DAR) 0.05 MG/24HR Place 1 patch onto the skin Twice a Week 10/1/18  Yes Zabrina Figueroa DO   clobetasol (TEMOVATE) 0.05 % ointment Apply topically 2 times daily. 18   Zabrina Figueroa DO   clotrimazole-betamethasone (LOTRISONE) 1-0.05 % cream Apply topically 2 times daily.  18   Sarah Salmeron DO       Current medications:    Current Facility-Administered Medications   Medication Dose Route Frequency Provider Last Rate Last Dose    lactated ringers infusion   Intravenous Continuous Ese Antis, APRN - CNP        sodium chloride flush 0.9 % injection 10 mL  10 mL Intravenous 2 times per day Ese Antis, APRN - CNP        sodium chloride flush 0.9 % injection 10 mL  10 mL Intravenous PRN Ese Antis, APRN - CNP        lidocaine PF 1 % injection 1 mL  1 mL Intradermal Once PRN Mally Seals, APRN - CNP        cefTRIAXone (ROCEPHIN) 1 g IVPB in 50 mL D5W minibag  1 g Intravenous On Call to Maryellen Chan MD        0.9 % sodium chloride infusion   Intravenous Continuous Roselyn Baig MD        fentaNYL (SUBLIMAZE) injection 50 mcg  50 mcg Intravenous Q10 Min PRN Ana María Laughlin MD        HYDROmorphone (DILAUDID) injection 0.5 mg  0.5 mg Intravenous Q10 Min PRN Ana María Laughlin MD        HYDROcodone-acetaminophen [facility]) 5-325 MG per tablet 1 tablet  1 tablet Oral PRN Ana María Laughlin MD        Or    HYDROcodone-acetaminophen [facility]) 5-325 MG per tablet 2 tablet  2 tablet Oral PRN Ana María Laughlin MD        diphenhydrAMINE (BENADRYL) injection 12.5 mg  12.5 mg Intravenous Once PRN Ana María Laughlin MD        ondansetron [facility]) injection 4 mg  4 mg Intravenous Once PRN Ana María Laughlin MD        metoclopramide [facility]) injection 10 mg  10 mg Intravenous Once PRN Ana María Laughlin MD        meperidine (DEMEROL) injection 12.5 mg  12.5 mg Intravenous Q5 Min PRN Ana María Laughlin MD        lactated ringers infusion   Intravenous Continuous Ana María Laughlin MD        sodium chloride flush 0.9 % injection 10 mL  10 mL Intravenous 2 times per day Ana María Laughlin MD        sodium chloride flush 0.9 % injection 10 mL  10 mL Intravenous PRN Ana María Laughlin MD        lidocaine PF 1 % injection 1 mL  1 mL Intradermal Once PRN Ana María Laughlin MD           Allergies: Allergies   Allergen Reactions    Antiseptic Products, Misc.  Rash     duraprep       Problem List:    Patient Active Problem List   Diagnosis Code    Hypothyroidism E03.9    HTN (hypertension) I10    Hyperlipidemia E78.5    Hypothyroidism E03.9    Thyroid goiter E04.9    Obesity E66.9    Adenomatous polyp of ascending colon D12.2    Osteoarthritis of left knee M17.12    Left renal stone N20.0    Arthritis of knee M17.10       Past Medical History:        Diagnosis Date    Arthritis     both knees    Hyperlipidemia     meds > 10 yrs    Hypertension     meds > 4 yrs    Hyperthyroidism     Hypothyroidism     meds > 20 yrs    Thyroid goiter        Past Surgical History:        Procedure Laterality Date    APPENDECTOMY      with left oophorectomy    BREAST BIOPSY Left 2012    x 2 / both benign    CHOLECYSTECTOMY      COLONOSCOPY  07    COLONOSCOPY  [id number]    Cecile Carreon (polyps)    COLONOSCOPY  16    w/polypectomy     COLONOSCOPY N/A 2019    COLORECTAL CANCER SCREENING, HIGH RISK performed by Jeri Corral MD at [address] / REMOVAL Wendelin Yuan / STONE Left 2019    CYSTOSCOPY LEFT RETROGRADE PYELOGRAM  LEFT DOUBLE J STENT performed by Letty Wilson MD at [facility]. [address], COLON, DIAGNOSTIC     [address] REPLACEMENT  2019    LTKR    KNEE ARTHROSCOPY Bilateral 1987    left X2 and Right X1    LITHOTRIPSY Left 2019    LEFT ESWL performed by Letty Wilson MD at [place] [address] Left 2019    LEFT TOTAL KNEE REPLACEMENT- [address] performed by Alexandria Munoz MD at [facility] [address] History:    Social History     Tobacco Use    Smoking status: Former Smoker     Packs/day: 0.25     Years: 5.00     Pack years: 1.25     Types: Cigarettes     Last attempt to quit:      Years since quittin.0    Smokeless tobacco: Never Used   Substance Use Topics    Alcohol use: Yes     Comment: occ.                                 Counseling given: Not Answered      Vital Signs (Current):   Vitals:    20 0643   BP: 137/64   Pulse: 65   Resp: 16   Temp: 98.5 °F (36.9 °C)   TempSrc: Temporal   SpO2: 95%   Weight: 211 lb (95.7 kg)   Height: 5' 7\" (1.702 m)                                              BP Readings from Last 3 Encounters:   20 137/64   19 138/88   19 (!) Blocker:  Dose within 24 Hrs         Neuro/Psych:   Negative Neuro/Psych ROS              GI/Hepatic/Renal:   (+) morbid obesity          Endo/Other:    (+) hypothyroidism, hyperthyroidism::., .          Pt had PAT visit. Abdominal:   (+) obese,         Vascular: negative vascular ROS. Anesthesia Plan      MAC     ASA 3       Induction: intravenous. MIPS: Prophylactic antiemetics administered. Anesthetic plan and risks discussed with patient. Plan discussed with CRNA.     Attending anesthesiologist reviewed and agrees with Pre Eval content              Ana María Lauglhin MD   1/8/2020

## 2020-01-09 NOTE — OP NOTE
[address]                      [address] [place], [address] [facility]                                OPERATIVE REPORT    PATIENT NAME: Emery Ragsdale                      :        1955  MED REC NO:   [id number]                            ROOM:  ACCOUNT NO:   [de-identified]                           ADMIT DATE: 2020  PROVIDER:     Paz Solitario MD    DATE OF PROCEDURE:  2020    PREOPERATIVE DIAGNOSIS:  Indwelling left double-J stent. POSTOPERATIVE DIAGNOSIS:  Indwelling left double-J stent. OPERATIONS PERFORMED:  Cystoscopy, left double-J stent removal.    SURGEON:  Leigha Vaca MD.    ANESTHESIA:  MAC. EBL:  0.    INDICATIONS:  The patient is a 60-year-old female who had a double-J  stent placed by Dr. Beth Murguia, and the patient was later treated with  shockwave lithotripsy for a left UPJ ureteral stone. Followup KUBs week  ago and today shows small fragments within the left renal pelvis. The  patient will have the stent removed with the understanding that if she  experiences colic, she may need to have the stent replaced. The patient  understands risks and benefits as explained by Dr. Beth Murguia. She received  preoperative IV antibiotics. OPERATIVE NOTE:  The patient was brought into the operating room on a  cart. She was sedated. Then, after sterile prep and drape, a 16-Chadian  flexible cystoscope was used to perform a cystoscopy. Cystoscopic  evaluation showed no evidence of tumors, stones, and/or other  abnormalities. The stent was visualized and removed. The patient was  discharged to recovery room in stable condition. The patient tolerated  the procedure well. There were no complications.         Yunior West MD    D: 2020 14:27:16       T: 2020 14:33:51     [id number]  Job#: [id number]     Doc#: [id number]    CC:

## 2020-01-21 ENCOUNTER — OFFICE VISIT (OUTPATIENT)
Dept: ENDOCRINOLOGY | Age: 65
End: 2020-01-21
Payer: COMMERCIAL

## 2020-01-21 VITALS
WEIGHT: 218 LBS | DIASTOLIC BLOOD PRESSURE: 86 MMHG | HEART RATE: 68 BPM | BODY MASS INDEX: 34.14 KG/M2 | SYSTOLIC BLOOD PRESSURE: 138 MMHG

## 2020-01-21 PROCEDURE — 99213 OFFICE O/P EST LOW 20 MIN: CPT | Performed by: INTERNAL MEDICINE

## 2020-01-21 RX ORDER — LEVOTHYROXINE SODIUM 0.15 MG/1
150 TABLET ORAL DAILY
Qty: 90 TABLET | Refills: 1 | Status: SHIPPED | OUTPATIENT
Start: 2020-01-21 | End: 2020-03-03 | Stop reason: SDUPTHER

## 2020-01-21 RX ORDER — BISOPROLOL FUMARATE 5 MG/1
5 TABLET ORAL DAILY
Qty: 90 TABLET | Refills: 1 | Status: SHIPPED | OUTPATIENT
Start: 2020-01-21 | End: 2020-04-28 | Stop reason: SDUPTHER

## 2020-01-22 NOTE — PROGRESS NOTES
bisoprolol (ZEBETA) 5 MG tablet, Take 1 tablet by mouth daily, Disp: 90 tablet, Rfl: 1    levothyroxine (SYNTHROID) 150 MCG tablet, Take 1 tablet by mouth daily, Disp: 90 tablet, Rfl: 1    pravastatin (PRAVACHOL) 40 MG tablet, Take 1 tablet by mouth daily, Disp: 90 tablet, Rfl: 0    estradiol (DAR) 0.05 MG/24HR, Place 1 patch onto the skin Twice a Week, Disp: 24 patch, Rfl: 5      Review of Systems   Genitourinary: Positive for dysuria and flank pain. All other systems reviewed and are negative. Vitals:    01/21/20 1723   BP: 138/86   Site: Right Upper Arm   Position: Sitting   Cuff Size: Large Adult   Pulse: 68   Weight: 218 lb (98.9 kg)       Objective:   Physical Exam  Constitutional:       Appearance: Normal appearance. She is well-developed. She is obese. HENT:      Head: Normocephalic and atraumatic. Neck:      Musculoskeletal: Normal range of motion and neck supple. Cardiovascular:      Rate and Rhythm: Normal rate. Musculoskeletal: Normal range of motion. Skin:     General: Skin is warm. Neurological:      Mental Status: She is alert. Psychiatric:         Mood and Affect: Mood normal.        Assessment:       Diagnosis Orders   1. Hypothyroidism, unspecified type  TSH without Reflex    T4, Free   2. Hyperlipidemia, unspecified hyperlipidemia type  Basic Metabolic Panel    Lipid Panel   3.  Hyperglycemia  Hemoglobin A1C           Plan:      Orders Placed This Encounter   Procedures    TSH without Reflex     Standing Status:   Future     Standing Expiration Date:   7/21/2021    T4, Free     Standing Status:   Future     Standing Expiration Date:   7/21/2021    Basic Metabolic Panel     Standing Status:   Future     Standing Expiration Date:   1/21/2021    Lipid Panel     Standing Status:   Future     Standing Expiration Date:   1/21/2021     Order Specific Question:   Is Patient Fasting?/# of Hours     Answer:   y    Hemoglobin A1C     Standing Status:   Future     Standing

## 2020-01-28 ENCOUNTER — OFFICE VISIT (OUTPATIENT)
Dept: UROLOGY | Age: 65
End: 2020-01-28

## 2020-01-28 ENCOUNTER — HOSPITAL ENCOUNTER (OUTPATIENT)
Dept: GENERAL RADIOLOGY | Age: 65
Discharge: HOME | End: 2020-01-30
Payer: COMMERCIAL

## 2020-01-28 VITALS
SYSTOLIC BLOOD PRESSURE: 132 MMHG | BODY MASS INDEX: 33.43 KG/M2 | DIASTOLIC BLOOD PRESSURE: 82 MMHG | HEIGHT: 67 IN | WEIGHT: 213 LBS | HEART RATE: 65 BPM

## 2020-01-28 DIAGNOSIS — N20.0 LEFT RENAL STONE: ICD-10-CM

## 2020-01-28 PROCEDURE — 74018 RADEX ABDOMEN 1 VIEW: CPT

## 2020-01-28 PROCEDURE — 99024 POSTOP FOLLOW-UP VISIT: CPT | Performed by: UROLOGY

## 2020-01-28 ASSESSMENT — ENCOUNTER SYMPTOMS: ABDOMINAL PAIN: 0

## 2020-01-28 NOTE — PROGRESS NOTES
prevention, increasing urine output over 2.5l per day, normocalcemic diet, no added salt, and decreased animal proteins. Printed info on stone diet was given to the patient today for review. Plan:      1. Stone for analysis  2.  F/U 6 mo for Erin Wesley MD

## 2020-01-31 LAB
CALCULI COMPOSITION: NORMAL
MASS: NORMAL MG
STONE DESCRIPTION: NORMAL
STONE NUMBER: NORMAL
STONE SIZE: NORMAL MM

## 2020-02-25 ENCOUNTER — OFFICE VISIT (OUTPATIENT)
Dept: FAMILY MEDICINE CLINIC | Age: 65
End: 2020-02-25
Payer: COMMERCIAL

## 2020-02-25 VITALS
DIASTOLIC BLOOD PRESSURE: 84 MMHG | TEMPERATURE: 96.9 F | OXYGEN SATURATION: 97 % | SYSTOLIC BLOOD PRESSURE: 122 MMHG | WEIGHT: 221.4 LBS | HEART RATE: 54 BPM | BODY MASS INDEX: 34.68 KG/M2

## 2020-02-25 PROCEDURE — 99213 OFFICE O/P EST LOW 20 MIN: CPT | Performed by: INTERNAL MEDICINE

## 2020-02-25 ASSESSMENT — PATIENT HEALTH QUESTIONNAIRE - PHQ9
1. LITTLE INTEREST OR PLEASURE IN DOING THINGS: 0
SUM OF ALL RESPONSES TO PHQ QUESTIONS 1-9: 0
SUM OF ALL RESPONSES TO PHQ9 QUESTIONS 1 & 2: 0
SUM OF ALL RESPONSES TO PHQ QUESTIONS 1-9: 0
2. FEELING DOWN, DEPRESSED OR HOPELESS: 0

## 2020-02-25 ASSESSMENT — ENCOUNTER SYMPTOMS
SHORTNESS OF BREATH: 0
DIARRHEA: 0
EYE ITCHING: 0
RECTAL PAIN: 0
EYE DISCHARGE: 0
COLOR CHANGE: 0
VOMITING: 0
APNEA: 0
VOICE CHANGE: 0
CONSTIPATION: 0
PHOTOPHOBIA: 0
BACK PAIN: 0
CHEST TIGHTNESS: 0
EYE PAIN: 0
FACIAL SWELLING: 0
EYE REDNESS: 0
SORE THROAT: 0
RHINORRHEA: 0
NAUSEA: 0
COUGH: 0
ABDOMINAL DISTENTION: 0
BLOOD IN STOOL: 0
WHEEZING: 0
SINUS PRESSURE: 0
SINUS PAIN: 0
ABDOMINAL PAIN: 0
TROUBLE SWALLOWING: 0

## 2020-02-25 NOTE — PROGRESS NOTES
2020    Freddy Mari (:  1955) is a 60 y.o. female, here for evaluation of the following medical concerns:    HPI  60-year-old female with a history of essential hypertension, hypothyroidism and hyperlipidemia presents to establish continuity with me as her primary care physician. The patient voices concern over having a slightly elevated blood sugar of 121 on a basic metabolic panel on . Hypertension: In regards to hypertension the patient is compliant with bisoprolol 5 mg orally daily. Menopausal symptoms: The patient symptoms are well controlled via estradiol which she takes        Hyperlipidemia: In regards to her hyperlipidemia the patient is compliant with pravastatin 40 mg orally daily        Hypothyroidism: In regards to her hypothyroidism the patient is compliant with Synthroid 150 mcg daily      Review of Systems   Constitutional: Negative for chills, diaphoresis, fatigue and fever. HENT: Negative for congestion, dental problem, drooling, ear discharge, ear pain, facial swelling, hearing loss, mouth sores, nosebleeds, postnasal drip, rhinorrhea, sinus pressure, sinus pain, sneezing, sore throat, tinnitus, trouble swallowing and voice change. Eyes: Negative for photophobia, pain, discharge, redness, itching and visual disturbance. Respiratory: Negative for apnea, cough, chest tightness, shortness of breath and wheezing. Cardiovascular: Negative for chest pain, palpitations and leg swelling. Gastrointestinal: Negative for abdominal distention, abdominal pain, blood in stool, constipation, diarrhea, nausea, rectal pain and vomiting. Endocrine: Negative for cold intolerance, heat intolerance, polydipsia, polyphagia and polyuria. Genitourinary: Negative for decreased urine volume, difficulty urinating, dysuria, flank pain, frequency, genital sores, hematuria and urgency.    Musculoskeletal: Negative for arthralgias, back pain, gait problem, joint swelling,  CYSTOSCOPY INSERTION / REMOVAL STENT / STONE Left 2019    CYSTOSCOPY LEFT RETROGRADE PYELOGRAM  LEFT DOUBLE J STENT performed by Smiley Miranda MD at [place], [facility]     [address]    JOINT REPLACEMENT  2019    LTKR    KNEE ARTHROSCOPY Bilateral 1987 2003    left X2 and Right X1    LITHOTRIPSY Left 2019    LEFT ESWL performed by Smiley Miranda MD at [facility] 32 Left 2019    LEFT TOTAL KNEE REPLACEMENT- [address] performed by Wilfred Arreguin MD at [facility] 386 History     Socioeconomic History    Marital status:      Spouse name: Not on file    Number of children: Not on file    Years of education: Not on file    Highest education level: Not on file   Occupational History    Not on file   Social Needs    Financial resource strain: Not on file    Food insecurity:     Worry: Not on file     Inability: Not on file    Transportation needs:     Medical: Not on file     Non-medical: Not on file   Tobacco Use    Smoking status: Former Smoker     Packs/day: 0.25     Years: 5.00     Pack years: 1.25     Types: Cigarettes     Last attempt to quit:      Years since quittin.1    Smokeless tobacco: Never Used   Substance and Sexual Activity    Alcohol use: Yes     Comment: occ.     Drug use: No     Comment: CBD oil to left knee    Sexual activity: Not on file   Lifestyle    Physical activity:     Days per week: Not on file     Minutes per session: Not on file    Stress: Not on file   Relationships    Social connections:     Talks on phone: Not on file     Gets together: Not on file     Attends Rastafarian service: Not on file     Active member of club or organization: Not on file     Attends meetings of clubs or organizations: Not on file     Relationship status: Not on file    Intimate partner violence:     Fear of current or ex partner: Not on file     Emotionally abused: Not on file Physically abused: Not on file     Forced sexual activity: Not on file   Other Topics Concern    Not on file   Social History Narrative    Not on file        Family History   Problem Relation Age of Onset    Heart Disease Mother     Cancer Mother         RENAL CELL    COPD Mother     Diabetes Other     Colon Cancer Father     Macular Degen Father     Cancer Brother         prostate cancer    No Known Problems Son        Vitals:    02/25/20 1102   BP: 122/84   Site: Right Upper Arm   Position: Sitting   Cuff Size: Small Adult   Pulse: 54   Temp: 96.9 °F (36.1 °C)   TempSrc: Temporal   SpO2: 97%   Weight: 221 lb 6.4 oz (100.4 kg)     Estimated body mass index is 34.68 kg/m² as calculated from the following:    Height as of 1/28/20: 5' 7\" (1.702 m). Weight as of this encounter: 221 lb 6.4 oz (100.4 kg). Physical Exam  Constitutional:       General: She is not in acute distress. Appearance: She is well-developed. HENT:      Head: Normocephalic. Right Ear: External ear normal.      Left Ear: External ear normal.   Eyes:      Conjunctiva/sclera: Conjunctivae normal.   Neck:      Musculoskeletal: Neck supple. Vascular: No JVD. Trachea: No tracheal deviation. Cardiovascular:      Rate and Rhythm: Normal rate and regular rhythm. Heart sounds: Normal heart sounds. Pulmonary:      Effort: Pulmonary effort is normal. No respiratory distress. Breath sounds: Normal breath sounds. No wheezing or rales. Chest:      Chest wall: No tenderness. Abdominal:      General: Bowel sounds are normal. There is no distension. Palpations: Abdomen is soft. There is no mass. Tenderness: There is no abdominal tenderness. There is no guarding or rebound. Musculoskeletal:         General: No tenderness or deformity. Lymphadenopathy:      Cervical: No cervical adenopathy. Skin:     General: Skin is warm and dry. Coloration: Skin is not pale.       Findings: No erythema or rash.   Neurological:      Mental Status: She is alert and oriented to person, place, and time. Motor: No abnormal muscle tone. Psychiatric:         Thought Content: Thought content normal.         Judgment: Judgment normal.         ASSESSMENT/PLAN:  1. Hyperglycemia  -Follow-up hemoglobin A1c which is been ordered by the patient's endocrinologist..    2. Essential hypertension  -Continue bisoprolol 5 mg orally daily    3. Hypothyroidism, unspecified type  -Continue Synthroid 150 mcg daily    4. Hyperlipidemia, unspecified hyperlipidemia type-lipid panel has been ordered by the patient's endocrinologist.  Will await the results of the study. 5. Class 1 obesity due to excess calories with serious comorbidity and body mass index (BMI) of 34.0 to 34.9 in adult    6. Postmenopausal symptoms-continue estradiol. Return in about 6 months (around 8/25/2020). An  electronic signature was used to authenticate this note.     --Lisa Kulkarni MD on 2/25/2020 at 11:45 AM

## 2020-02-26 DIAGNOSIS — E78.5 HYPERLIPIDEMIA, UNSPECIFIED HYPERLIPIDEMIA TYPE: ICD-10-CM

## 2020-02-26 DIAGNOSIS — R73.9 HYPERGLYCEMIA: ICD-10-CM

## 2020-02-26 DIAGNOSIS — E03.9 HYPOTHYROIDISM, UNSPECIFIED TYPE: ICD-10-CM

## 2020-02-26 LAB
ANION GAP SERPL CALCULATED.3IONS-SCNC: 16 MEQ/L (ref 9–15)
BUN BLDV-MCNC: 17 MG/DL (ref 8–23)
CALCIUM SERPL-MCNC: 8.9 MG/DL (ref 8.5–9.9)
CHLORIDE BLD-SCNC: 108 MEQ/L (ref 95–107)
CHOLESTEROL, TOTAL: 164 MG/DL (ref 0–199)
CO2: 21 MEQ/L (ref 20–31)
CREAT SERPL-MCNC: 0.97 MG/DL (ref 0.5–0.9)
GFR AFRICAN AMERICAN: >60
GFR NON-AFRICAN AMERICAN: 57.8
GLUCOSE BLD-MCNC: 111 MG/DL (ref 70–99)
HBA1C MFR BLD: 5.8 % (ref 4.8–5.9)
HDLC SERPL-MCNC: 35 MG/DL (ref 40–59)
LDL CHOLESTEROL CALCULATED: 104 MG/DL (ref 0–129)
POTASSIUM SERPL-SCNC: 4.4 MEQ/L (ref 3.4–4.9)
SODIUM BLD-SCNC: 145 MEQ/L (ref 135–144)
T4 FREE: 1.7 NG/DL (ref 0.84–1.68)
TRIGL SERPL-MCNC: 126 MG/DL (ref 0–150)
TSH SERPL DL<=0.05 MIU/L-ACNC: 4.56 UIU/ML (ref 0.44–3.86)

## 2020-02-28 ENCOUNTER — OFFICE VISIT (OUTPATIENT)
Dept: FAMILY MEDICINE CLINIC | Age: 65
End: 2020-02-28
Payer: COMMERCIAL

## 2020-02-28 VITALS
BODY MASS INDEX: 34.53 KG/M2 | OXYGEN SATURATION: 98 % | HEART RATE: 51 BPM | DIASTOLIC BLOOD PRESSURE: 76 MMHG | WEIGHT: 220 LBS | SYSTOLIC BLOOD PRESSURE: 132 MMHG | TEMPERATURE: 98.7 F | HEIGHT: 67 IN

## 2020-02-28 PROCEDURE — 99213 OFFICE O/P EST LOW 20 MIN: CPT | Performed by: INTERNAL MEDICINE

## 2020-02-28 RX ORDER — PREDNISOLONE ACETATE 10 MG/ML
SUSPENSION/ DROPS OPHTHALMIC
COMMUNITY
Start: 2020-02-24 | End: 2020-07-28 | Stop reason: ALTCHOICE

## 2020-02-28 ASSESSMENT — ENCOUNTER SYMPTOMS
COLOR CHANGE: 0
SORE THROAT: 0
EYE DISCHARGE: 0
APNEA: 0
DIARRHEA: 0
CONSTIPATION: 0
COUGH: 0
EYE ITCHING: 0
SINUS PRESSURE: 0
RECTAL PAIN: 0
FACIAL SWELLING: 0
RHINORRHEA: 0
VOMITING: 0
EYE PAIN: 0
CHEST TIGHTNESS: 0
TROUBLE SWALLOWING: 0
BACK PAIN: 0
EYE REDNESS: 0
ABDOMINAL DISTENTION: 0
VOICE CHANGE: 0
SINUS PAIN: 0
WHEEZING: 0
BLOOD IN STOOL: 0
ABDOMINAL PAIN: 0
PHOTOPHOBIA: 0
NAUSEA: 0
SHORTNESS OF BREATH: 0

## 2020-02-28 NOTE — PROGRESS NOTES
urgency. Musculoskeletal: Negative for arthralgias, back pain, gait problem, joint swelling, myalgias, neck pain and neck stiffness. Skin: Negative for color change, rash and wound. Allergic/Immunologic: Negative for environmental allergies and food allergies. Neurological: Negative for dizziness, tremors, seizures, syncope, facial asymmetry, speech difficulty, weakness, light-headedness, numbness and headaches. Hematological: Negative for adenopathy. Does not bruise/bleed easily. Psychiatric/Behavioral: Negative for agitation, confusion, decreased concentration, hallucinations, self-injury, sleep disturbance and suicidal ideas. The patient is not nervous/anxious. Prior to Visit Medications    Medication Sig Taking? Authorizing Provider   prednisoLONE acetate (PRED FORTE) 1 % ophthalmic suspension  Yes Historical Provider, MD   bisoprolol (ZEBETA) 5 MG tablet Take 1 tablet by mouth daily Yes John Wyman MD   levothyroxine (SYNTHROID) 150 MCG tablet Take 1 tablet by mouth daily Yes John Wyman MD   pravastatin (PRAVACHOL) 40 MG tablet Take 1 tablet by mouth daily Yes John Wyman MD   estradiol (DAR) 0.05 MG/24HR Place 1 patch onto the skin Twice a Week Yes Yumiko Patella, DO        Allergies   Allergen Reactions    Antiseptic Products, Misc.  Rash     duraprep       Past Medical History:   Diagnosis Date    Arthritis     both knees    Hyperlipidemia     meds > 10 yrs    Hypertension     meds > 4 yrs    Hyperthyroidism     Hypothyroidism     meds > 20 yrs    Thyroid goiter        Past Surgical History:   Procedure Laterality Date    APPENDECTOMY  1981    with left oophorectomy    BREAST BIOPSY Left 2012    x 2 / both benign    CHOLECYSTECTOMY  2008    COLONOSCOPY  11-16-07    COLONOSCOPY  022334    Precious Carreon (polyps)    COLONOSCOPY  2/12/16    w/polypectomy     COLONOSCOPY N/A 2/18/2019    COLORECTAL CANCER SCREENING, HIGH RISK performed [facility]ickson [facility] at [facility]    CYSTOSCOPY Left 2020    FLEXIBLE CYSTOSCOPY LEFT DOUBLE J STENT REMOVAL (RECENT SURGERY 19) KUB ON ARRIVAL performed by Luis Ravi MD at [address] / REMOVAL [address] / David Ruiz Left 2019    CYSTOSCOPY LEFT RETROGRADE PYELOGRAM  LEFT DOUBLE J STENT performed by Mohan Zhang MD at [facility]. [address], COLON, DIAGNOSTIC     [address]    JOINT REPLACEMENT  2019    LTKR    KNEE ARTHROSCOPY Bilateral 1987    left X2 and Right X1    LITHOTRIPSY Left 2019    LEFT ESWL performed by Mohan Zhang MD at [address] [place] [address] Rd Left 2019    LEFT TOTAL KNEE REPLACEMENT- [address] performed by Gabriela Yeung MD at [facility] 386 History     Socioeconomic History    Marital status:      Spouse name: Not on file    Number of children: Not on file    Years of education: Not on file    Highest education level: Not on file   Occupational History    Not on file   Social Needs    Financial resource strain: Not on file    Food insecurity:     Worry: Not on file     Inability: Not on file    Transportation needs:     Medical: Not on file     Non-medical: Not on file   Tobacco Use    Smoking status: Former Smoker     Packs/day: 0.25     Years: 5.00     Pack years: 1.25     Types: Cigarettes     Last attempt to quit: 1981     Years since quittin.1    Smokeless tobacco: Never Used   Substance and Sexual Activity    Alcohol use: Yes     Comment: occ.     Drug use: No     Comment: CBD oil to left knee    Sexual activity: Not on file   Lifestyle    Physical activity:     Days per week: Not on file     Minutes per session: Not on file    Stress: Not on file   Relationships    Social connections:     Talks on phone: Not on file     Gets together: Not on file     Attends Bahai service: Not on file     Active member of club or organization: Not on file     Attends meetings of clubs or No tenderness or deformity. Lymphadenopathy:      Cervical: No cervical adenopathy. Skin:     General: Skin is warm and dry. Coloration: Skin is not pale. Findings: No erythema or rash. Neurological:      Mental Status: She is alert and oriented to person, place, and time. Motor: No abnormal muscle tone. Psychiatric:         Thought Content: Thought content normal.         Judgment: Judgment normal.         ASSESSMENT/PLAN:  1. Hyperglycemia  -Encourage implementation of the [employer], aerobic exercise daily and weight loss of approximately 10 pounds. 2. Essential hypertension  -Continue bisoprolol 5 mg orally daily      3. Hypothyroidism, unspecified type  -Continue Synthroid 150 mcg daily    4. Hyperlipidemia, unspecified hyperlipidemia type-lipid panel has been ordered by the patient's endocrinologist.  Will await the results of the study. 5. Class 1 obesity due to excess calories with serious comorbidity and body mass index (BMI) of 34.0 to 34.9 in adult- as stated per #1. 6.  Postmenopausal symptoms-continue estradiol. Return in about 1 month (around 3/28/2020). An  electronic signature was used to authenticate this note.     --Luis Finley MD on 3/1/2020 at 5:48 PM

## 2020-03-03 ENCOUNTER — OFFICE VISIT (OUTPATIENT)
Dept: ENDOCRINOLOGY | Age: 65
End: 2020-03-03
Payer: COMMERCIAL

## 2020-03-03 VITALS
SYSTOLIC BLOOD PRESSURE: 137 MMHG | DIASTOLIC BLOOD PRESSURE: 85 MMHG | HEIGHT: 67 IN | WEIGHT: 220 LBS | BODY MASS INDEX: 34.53 KG/M2 | HEART RATE: 71 BPM

## 2020-03-03 PROCEDURE — 99213 OFFICE O/P EST LOW 20 MIN: CPT | Performed by: INTERNAL MEDICINE

## 2020-03-03 RX ORDER — LEVOTHYROXINE SODIUM 137 UG/1
137 TABLET ORAL DAILY
Qty: 30 TABLET | Refills: 3 | Status: SHIPPED | OUTPATIENT
Start: 2020-03-03 | End: 2020-04-28 | Stop reason: SDUPTHER

## 2020-03-04 NOTE — PROGRESS NOTES
Current Outpatient Medications:     levothyroxine (SYNTHROID) 137 MCG tablet, Take 1 tablet by mouth daily, Disp: 30 tablet, Rfl: 03    prednisoLONE acetate (PRED FORTE) 1 % ophthalmic suspension, , Disp: , Rfl:     bisoprolol (ZEBETA) 5 MG tablet, Take 1 tablet by mouth daily, Disp: 90 tablet, Rfl: 1    pravastatin (PRAVACHOL) 40 MG tablet, Take 1 tablet by mouth daily, Disp: 90 tablet, Rfl: 0    estradiol (DAR) 0.05 MG/24HR, Place 1 patch onto the skin Twice a Week, Disp: 24 patch, Rfl: 5    Review of Systems   Constitutional: Positive for fatigue. Neurological: Positive for dizziness. Negative for headaches. All other systems reviewed and are negative. Vitals:    03/03/20 1333   BP: 137/85   Pulse: 71   Weight: 220 lb (99.8 kg)   Height: 5' 7\" (1.702 m)       Objective:   Physical Exam  Constitutional:       Appearance: Normal appearance. She is obese. HENT:      Head: Normocephalic and atraumatic. Neck:      Musculoskeletal: Normal range of motion and neck supple. Cardiovascular:      Rate and Rhythm: Normal rate. Musculoskeletal: Normal range of motion. Neurological:      General: No focal deficit present. Mental Status: She is alert. Psychiatric:         Mood and Affect: Mood normal.         Assessment:       Diagnosis Orders   1.  Hypothyroidism, unspecified type  TSH with Reflex    T4, Free           Plan:      Orders Placed This Encounter   Procedures    TSH with Reflex     Standing Status:   Future     Standing Expiration Date:   3/3/2021    T4, Free     Standing Status:   Future     Standing Expiration Date:   3/3/2021     Orders Placed This Encounter   Medications    levothyroxine (SYNTHROID) 137 MCG tablet     Sig: Take 1 tablet by mouth daily     Dispense:  30 tablet     Refill:  03     Lower dose rpt labs   Pt declined neuro referral         Reji Jaquez MD

## 2020-03-20 RX ORDER — PRAVASTATIN SODIUM 40 MG
TABLET ORAL
Qty: 90 TABLET | Refills: 0 | Status: SHIPPED | OUTPATIENT
Start: 2020-03-20 | End: 2020-06-23 | Stop reason: SDUPTHER

## 2020-03-20 RX ORDER — PRAVASTATIN SODIUM 40 MG
TABLET ORAL
Qty: 90 TABLET | Refills: 1 | Status: SHIPPED
Start: 2020-03-20 | End: 2020-07-28 | Stop reason: SDUPTHER

## 2020-04-28 RX ORDER — LEVOTHYROXINE SODIUM 137 UG/1
137 TABLET ORAL DAILY
Qty: 90 TABLET | Refills: 1 | Status: SHIPPED
Start: 2020-04-28 | End: 2020-07-28 | Stop reason: SDUPTHER

## 2020-04-28 RX ORDER — LEVOTHYROXINE SODIUM 137 UG/1
137 TABLET ORAL DAILY
Qty: 90 TABLET | Refills: 1 | Status: SHIPPED | OUTPATIENT
Start: 2020-04-28 | End: 2020-07-21 | Stop reason: SDUPTHER

## 2020-04-28 RX ORDER — BISOPROLOL FUMARATE 5 MG/1
5 TABLET ORAL DAILY
Qty: 90 TABLET | Refills: 1 | Status: SHIPPED | OUTPATIENT
Start: 2020-04-28 | End: 2020-07-21 | Stop reason: SDUPTHER

## 2020-04-28 RX ORDER — BISOPROLOL FUMARATE 5 MG/1
5 TABLET ORAL DAILY
Qty: 90 TABLET | Refills: 1 | Status: SHIPPED
Start: 2020-04-28 | End: 2020-07-28 | Stop reason: SDUPTHER

## 2020-05-05 RX ORDER — BISOPROLOL FUMARATE 5 MG/1
5 TABLET ORAL DAILY
Qty: 90 TABLET | Refills: 1 | OUTPATIENT
Start: 2020-05-05

## 2020-06-23 RX ORDER — PRAVASTATIN SODIUM 40 MG
TABLET ORAL
Qty: 90 TABLET | Refills: 1 | Status: SHIPPED | OUTPATIENT
Start: 2020-06-23 | End: 2020-12-04 | Stop reason: SDUPTHER

## 2020-06-29 ENCOUNTER — PATIENT MESSAGE (OUTPATIENT)
Dept: OBGYN CLINIC | Age: 65
End: 2020-06-29

## 2020-06-29 RX ORDER — ESTRADIOL 0.05 MG/D
1 PATCH TRANSDERMAL
Qty: 24 PATCH | Refills: 6 | Status: SHIPPED | OUTPATIENT
Start: 2020-06-29 | End: 2021-11-11 | Stop reason: SDUPTHER

## 2020-06-29 NOTE — TELEPHONE ENCOUNTER
From: Kalyn Awan  To: Sheri Rust DO  Sent: 6/29/2020 12:15 PM EDT  Subject: Non-Urgent Medical Question    Please send a 3 month supply of my Miroslava 0.05 mg hormone patch to the [facility] mail order pharmacy for me.  Please write DISPENSE AS WRITTEN so they donor give me the generic thank you

## 2020-06-30 ENCOUNTER — PATIENT MESSAGE (OUTPATIENT)
Dept: OBGYN CLINIC | Age: 65
End: 2020-06-30

## 2020-07-01 RX ORDER — CLOTRIMAZOLE AND BETAMETHASONE DIPROPIONATE 10; .64 MG/G; MG/G
CREAM TOPICAL
Qty: 30 G | Refills: 1 | Status: SHIPPED | OUTPATIENT
Start: 2020-07-01 | End: 2020-07-28 | Stop reason: ALTCHOICE

## 2020-07-01 NOTE — TELEPHONE ENCOUNTER
From: Jaxson Peraza  To: Mary Ellen Moore DO  Sent: 6/30/2020 12:12 PM EDT  Subject: Prescription Question    Please refill the betamethasone cream that you prescribed for me once before a coupe years ago. I am out of it. Send it to the hospital pharmacy in the [facility] office [facility].  thank you

## 2020-07-08 ENCOUNTER — TELEPHONE (OUTPATIENT)
Dept: OBGYN CLINIC | Age: 65
End: 2020-07-08

## 2020-07-08 NOTE — TELEPHONE ENCOUNTER
You sent her a message through [phone] but she is unable/cannot respond, please call her at [phone].

## 2020-07-13 ENCOUNTER — TELEPHONE (OUTPATIENT)
Dept: OBGYN CLINIC | Age: 65
End: 2020-07-13

## 2020-07-17 DIAGNOSIS — E03.9 HYPOTHYROIDISM, UNSPECIFIED TYPE: ICD-10-CM

## 2020-07-17 LAB
T4 FREE: 1.42 NG/DL (ref 0.84–1.68)
TSH REFLEX: 14.92 UIU/ML (ref 0.44–3.86)

## 2020-07-21 ENCOUNTER — TELEMEDICINE (OUTPATIENT)
Dept: ENDOCRINOLOGY | Age: 65
End: 2020-07-21
Payer: COMMERCIAL

## 2020-07-21 PROCEDURE — 99213 OFFICE O/P EST LOW 20 MIN: CPT | Performed by: INTERNAL MEDICINE

## 2020-07-21 RX ORDER — LEVOTHYROXINE SODIUM 137 UG/1
137 TABLET ORAL DAILY
Qty: 90 TABLET | Refills: 1 | Status: SHIPPED | OUTPATIENT
Start: 2020-07-21 | End: 2020-12-04 | Stop reason: SDUPTHER

## 2020-07-21 RX ORDER — BISOPROLOL FUMARATE 5 MG/1
5 TABLET ORAL DAILY
Qty: 90 TABLET | Refills: 1 | Status: SHIPPED | OUTPATIENT
Start: 2020-07-21 | End: 2021-01-26 | Stop reason: SDUPTHER

## 2020-07-21 NOTE — PROGRESS NOTES
2020    TELEHEALTH EVALUATION -- Audio/Visual (During XZBMV-33 public health emergency)    Due to [place] 19 outbreak, patient's office visit was converted to a virtual visit. Patient was contacted and agreed to proceed with a virtual visit via Doxy. me  The risks and benefits of converting to a virtual visit were discussed in light of the current infectious disease epidemic. Patient also understood that insurance coverage and co-pays are up to their individual insurance plans. HPI: Patient made aware to video visit billable visit patient was at home I was at my office    Stephanie Arndt (:  1955) has requested an audio/video evaluation for the following concern(s):    Hypothyroidism on replacement with Synthroid 137 mcg symptoms are stable despite TSH being high was having palpitations which have improved      Results for Yuliya Hadley (MRN [id number]) as of 2020 13:45   Ref. Range 2020 07:17 2020 14:28   TSH Latest Ref Range: 0.440 - 3.860 uIU/mL 4.560 (H) 14.920 (H)   T4 Free Latest Ref Range: 0.84 - 1.68 ng/dL 1.70 (H) 1.42     Patient Active Problem List   Diagnosis    HTN (hypertension)    Hyperlipidemia    Hypothyroidism    Thyroid goiter    Obesity    Adenomatous polyp of ascending colon    Osteoarthritis of left knee    Left renal stone    Arthritis of knee    Calculus of kidney       Review of Systems    Prior to Visit Medications    Medication Sig Taking? Authorizing Provider   clotrimazole-betamethasone (LOTRISONE) 1-0.05 % cream Apply topically 2 times daily.   Doris Figueroa DO   DAR 0.05 MG/24HR Place 1 patch onto the skin Twice a Week  Alexis Figueroa DO   pravastatin (PRAVACHOL) 40 MG tablet TAKE 1 TABLET BY MOUTH ONE TIME A DAY  Jose Alejandro Zimmerman MD   bisoprolol (ZEBETA) 5 MG tablet Take 1 tablet by mouth daily  Suha Roger MD   levothyroxine (SYNTHROID) 137 MCG tablet Take 1 tablet by mouth daily  Suha Roger MD   bisoprolol (ZEBETA) 5 MG tablet Take 1 tablet by mouth daily  Lindsey Hussein MD   levothyroxine (SYNTHROID) 137 MCG tablet Take 1 tablet by mouth daily  Lindsey Hussein MD   pravastatin (PRAVACHOL) 40 MG tablet Take 1 tablet by mouth daily  Lindsey Hussein MD   prednisoLONE acetate (PRED FORTE) 1 % ophthalmic suspension   Historical Provider, MD       Social History     Tobacco Use    Smoking status: Former Smoker     Packs/day: 0.25     Years: 5.00     Pack years: 1.25     Types: Cigarettes     Last attempt to quit:      Years since quittin.5    Smokeless tobacco: Never Used   Substance Use Topics    Alcohol use: Yes     Comment: occ.  Drug use: No     Comment: CBD oil to left knee            PHYSICAL EXAMINATION:  [ INSTRUCTIONS:  \"[x]\" Indicates a positive item  \"[]\" Indicates a negative item  -- DELETE ALL ITEMS NOT EXAMINED]  [] Alert  [] Oriented to person/place/time    [] No apparent distress  [] Toxic appearing    [] Face flushed appearing [] Sclera clear  [] Lips are cyanotic      [] Breathing appears normal  [] Appears tachypneic      [] Rash on visible skin    [] Cranial Nerves II-XII grossly intact    [] Motor grossly intact in visible upper extremities    [] Motor grossly intact in visible lower extremities    [] Normal Mood  [] Anxious appearing    [] Depressed appearing  [] Confused appearing      [] Poor short term memory  [] Poor long term memory    [] OTHER:      Due to this being a TeleHealth encounter, evaluation of the following organ systems is limited: Vitals/Constitutional/EENT/Resp/CV/GI//MS/Neuro/Skin/Heme-Lymph-Imm. ASSESSMENT/PLAN:     Diagnosis Orders   1.  Hypothyroidism, unspecified type  T4, Free    TSH without Reflex     Orders Placed This Encounter   Procedures    T4, Free     Standing Status:   Future     Standing Expiration Date:   2021    TSH without Reflex     Standing Status:   Future     Standing Expiration Date:   2021     Orders Placed This Encounter   Medications    levothyroxine (SYNTHROID) 137

## 2020-07-25 ENCOUNTER — HOSPITAL ENCOUNTER (OUTPATIENT)
Dept: GENERAL RADIOLOGY | Age: 65
Discharge: HOME | End: 2020-07-27
Payer: COMMERCIAL

## 2020-07-25 PROCEDURE — 74018 RADEX ABDOMEN 1 VIEW: CPT

## 2020-07-28 ENCOUNTER — OFFICE VISIT (OUTPATIENT)
Dept: UROLOGY | Age: 65
End: 2020-07-28
Payer: COMMERCIAL

## 2020-07-28 VITALS
WEIGHT: 220 LBS | SYSTOLIC BLOOD PRESSURE: 134 MMHG | DIASTOLIC BLOOD PRESSURE: 86 MMHG | HEART RATE: 64 BPM | BODY MASS INDEX: 34.53 KG/M2 | HEIGHT: 67 IN

## 2020-07-28 PROCEDURE — 99213 OFFICE O/P EST LOW 20 MIN: CPT | Performed by: UROLOGY

## 2020-07-28 ASSESSMENT — ENCOUNTER SYMPTOMS
ABDOMINAL DISTENTION: 0
ABDOMINAL PAIN: 0

## 2020-07-28 NOTE — PROGRESS NOTES
Subjective:      Patient ID: Milind Pollard is a 60 y.o. female. HPI  This is a 56 yo female with h/o HTN, hypothyroidism and elevated cholesterol and with Lt 1 cm renal stone s/p Lt ESWL with stent on 12/23/19 and then stent removal. Since last seen on 1/28/20, she has been doing well and has no renal colic or hematuria or other  complaints. I reviewed the interval KUB and there are two stones in the Lt LP.        Past Medical History:   Diagnosis Date    Arthritis     both knees    Hyperlipidemia     meds > 10 yrs    Hypertension     meds > 4 yrs    Hyperthyroidism     Hypothyroidism     meds > 20 yrs    Thyroid goiter      Past Surgical History:   Procedure Laterality Date    APPENDECTOMY  1981    with left oophorectomy    BREAST BIOPSY Left 2012    x 2 / both benign    CHOLECYSTECTOMY  2008    COLONOSCOPY  11-16-07    COLONOSCOPY  [id number]    Kristy Carreon (polyps)    COLONOSCOPY  2/12/16    w/polypectomy     COLONOSCOPY N/A 2/18/2019    COLORECTAL CANCER SCREENING, HIGH RISK performed by Dianna Roth MD at [place] [address]. Left 1/8/2020    FLEXIBLE CYSTOSCOPY LEFT DOUBLE J STENT REMOVAL (RECENT SURGERY 12/23/19) KUB ON ARRIVAL performed by Jeffrey Alpers, MD at [address] / [address] / STONE Left 12/23/2019    CYSTOSCOPY LEFT RETROGRADE PYELOGRAM  LEFT DOUBLE J STENT performed by Joey Mcnulty MD at [facility]. [address], COLON, DIAGNOSTIC     [address] REPLACEMENT  06/2019    LTKR    KNEE ARTHROSCOPY Bilateral 1987 2003 1994    left X2 and Right X1    LITHOTRIPSY Left 12/23/2019    LEFT ESWL performed [address] Joey Mcnulty MD [address] Left 6/19/2019    LEFT TOTAL KNEE REPLACEMENT- [address] performed by Melinda Feldman MD at [address] [facility] Marital status:      Spouse name: Not on file    Number of children: Not on file    Years of education: Not on file    Highest education level: Not on file   Occupational History    Not on file   Social Needs    Financial resource strain: Not on file    Food insecurity     Worry: Not on file     Inability: Not on file    Transportation needs     Medical: Not on file     Non-medical: Not on file   Tobacco Use    Smoking status: Former Smoker     Packs/day: 0.25     Years: 5.00     Pack years: 1.25     Types: Cigarettes     Last attempt to quit:      Years since quittin.5    Smokeless tobacco: Never Used   Substance and Sexual Activity    Alcohol use: Yes     Comment: occ.     Drug use: No     Comment: CBD oil to left knee    Sexual activity: Not on file   Lifestyle    Physical activity     Days per week: Not on file     Minutes per session: Not on file    Stress: Not on file   Relationships    Social connections     Talks on phone: Not on file     Gets together: Not on file     Attends [employer] service: Not on file     Active member of club or organization: Not on file     Attends meetings of clubs or organizations: Not on file     Relationship status: Not on file    Intimate partner violence     Fear of current or ex partner: Not on file     Emotionally abused: Not on file     Physically abused: Not on file     Forced sexual activity: Not on file   Other Topics Concern    Not on file   Social History Narrative    Not on file     Family History   Problem Relation Age of Onset    Heart Disease Mother     Cancer Mother         RENAL CELL    COPD Mother     Diabetes Other     Colon Cancer Father     Macular Degen Father     Cancer Brother         prostate cancer    No Known Problems Son      Current Outpatient Medications   Medication Sig Dispense Refill    levothyroxine (SYNTHROID) 137 MCG tablet Take 1 tablet by mouth daily 90 tablet 1    bisoprolol (ZEBETA) 5 MG tablet Take 1 tablet by mouth daily 90 tablet 1    clotrimazole-betamethasone (LOTRISONE) 1-0.05 % cream Apply topically 2 times daily. 30 g 1    DAR 0.05 MG/24HR Place 1 patch onto the skin Twice a Week 24 patch 6    pravastatin (PRAVACHOL) 40 MG tablet TAKE 1 TABLET BY MOUTH ONE TIME A DAY 90 tablet 1    bisoprolol (ZEBETA) 5 MG tablet Take 1 tablet by mouth daily 90 tablet 1    levothyroxine (SYNTHROID) 137 MCG tablet Take 1 tablet by mouth daily 90 tablet 1    pravastatin (PRAVACHOL) 40 MG tablet Take 1 tablet by mouth daily 90 tablet 1    prednisoLONE acetate (PRED FORTE) 1 % ophthalmic suspension        No current facility-administered medications for this visit. Antiseptic products, misc.  reviewed      Review of Systems   Constitutional: Negative for fever and unexpected weight change. Gastrointestinal: Negative for abdominal distention and abdominal pain. Genitourinary: Negative for dysuria, flank pain and hematuria. Objective:   Physical Exam  Constitutional:       Appearance: Normal appearance. Neurological:      Mental Status: She is alert. Psychiatric:         Mood and Affect: Mood normal.         Behavior: Behavior normal.         1/31/2020  9:38 AM - Caleb, Marcial Incoming Lab Results From Soft           Component  Value  Ref Range & Units  Status  Collected  Lab    MASS  See Note  mg  Final  01/28/2020  5:00 PM  [facility]    Sample mass < 2 mg. Accurate measurement of weight, size,   number and description of the sample cannot be determined. Stone Number  Not Applicable   Final  14/44/9572  5:00 PM  [facility]    Berneice Silvius Size  Not Applicable  mm  Final  28/20/9034  5:00 PM  [facility]    Stone Description  Not Applicable   Final  81/76/5618  5:00 PM  [facility]    Calculi Composition  See Note   Final  01/28/2020  5:00 PM  [facility]    Calculi composed primarily of:   90% calcium oxalate monohydrate, and   10% calcium oxalate dihydrate.    INTERPRETIVE INFORMATION: Calculi (Stone) analysis   Calculi are the products of physiological processes that yield   crystalline   compounds in a matrix of biological compounds and blood.  Matrix   components   are not reported.  The clinically significant crystalline components   identified in calculi specimens are reported.  Gross description may   not be   consistent with composition determined by FTIR analysis. Performed [facility] [place] [address], [address] [facility] [phone]   www. Evan Day MD, Lab. Director        Assessment: This is a 58 yo female with h/o HTN, hypothyroidism and elevated cholesterol and s/p Lt ESWL for 1 cm Lt renal stone with stable small fragments in the LP and no current colic or symptoms suggestive of active stone dz. Again, the option of repeat ESWL without stent vs close follow-up was discussed and she wants the latter. She understands the possibility for acute colic, stone growth and infection without treatment. Also, again discussed dietary stone prevention. Plan:      1.  F/U 1 yr for Annette Anderson MD

## 2020-10-19 ENCOUNTER — TELEPHONE (OUTPATIENT)
Dept: OBGYN CLINIC | Age: 65
End: 2020-10-19

## 2020-10-19 NOTE — TELEPHONE ENCOUNTER
Order sent
Patient scheduled her annual visit for 12/01 and is requesting a mammogram order to have done prior. Please advise.
10-Mar-2018 23:55

## 2020-11-13 ENCOUNTER — HOSPITAL ENCOUNTER (OUTPATIENT)
Dept: WOMENS IMAGING | Age: 65
Discharge: HOME | End: 2020-11-15
Payer: COMMERCIAL

## 2020-11-13 PROCEDURE — 77063 BREAST TOMOSYNTHESIS BI: CPT

## 2020-12-03 DIAGNOSIS — E03.9 HYPOTHYROIDISM, UNSPECIFIED TYPE: ICD-10-CM

## 2020-12-03 LAB
T4 FREE: 1.21 NG/DL (ref 0.84–1.68)
TSH SERPL DL<=0.05 MIU/L-ACNC: 5.48 UIU/ML (ref 0.44–3.86)

## 2020-12-04 ENCOUNTER — OFFICE VISIT (OUTPATIENT)
Dept: ENDOCRINOLOGY | Age: 65
End: 2020-12-04
Payer: COMMERCIAL

## 2020-12-04 VITALS
OXYGEN SATURATION: 95 % | DIASTOLIC BLOOD PRESSURE: 81 MMHG | BODY MASS INDEX: 35 KG/M2 | HEIGHT: 67 IN | WEIGHT: 223 LBS | SYSTOLIC BLOOD PRESSURE: 123 MMHG | HEART RATE: 61 BPM

## 2020-12-04 PROCEDURE — 99213 OFFICE O/P EST LOW 20 MIN: CPT | Performed by: INTERNAL MEDICINE

## 2020-12-04 RX ORDER — LEVOTHYROXINE SODIUM 137 UG/1
137 TABLET ORAL DAILY
Qty: 90 TABLET | Refills: 1 | Status: SHIPPED | OUTPATIENT
Start: 2020-12-04 | End: 2021-03-11 | Stop reason: ALTCHOICE

## 2020-12-04 RX ORDER — PRAVASTATIN SODIUM 40 MG
TABLET ORAL
Qty: 90 TABLET | Refills: 1 | Status: SHIPPED | OUTPATIENT
Start: 2020-12-04 | End: 2021-06-21

## 2020-12-04 NOTE — PROGRESS NOTES
Subjective:      Patient ID: Mehreen Feliciano is a 60 y.o. female. Follow-up on hypothyroidism lab review TSH is improved currently slightly high improved from 14-5.8    Hypercholesterolemia on Pravachol requesting refills  Other   This is a chronic (hypothyroidism ) problem. The current episode started more than 1 year ago. The problem has been gradually improving. Treatments tried: synthyroid  The treatment provided moderate relief. Results for Geronimo Santos (MRN [id number]) as of 12/4/2020 10:17   Ref. Range 7/17/2020 14:28 7/25/2020 08:03 11/13/2020 07:50 11/20/2020 03:40 12/3/2020 07:58   TSH Latest Ref Range: 0.440 - 3.860 uIU/mL 14.920 (H)    5.480 (H)   T4 Free Latest Ref Range: 0.84 - 1.68 ng/dL 1.42    1.21       Patient Active Problem List   Diagnosis    HTN (hypertension)    Hyperlipidemia    Hypothyroidism    Thyroid goiter    Obesity    Adenomatous polyp of ascending colon    Osteoarthritis of left knee    Left renal stone    Arthritis of knee    Calculus of kidney     Allergies   Allergen Reactions    Antiseptic Products, Misc. Rash     duraprep       Current Outpatient Medications:     levothyroxine (SYNTHROID) 137 MCG tablet, Take 1 tablet by mouth daily, Disp: 90 tablet, Rfl: 1    bisoprolol (ZEBETA) 5 MG tablet, Take 1 tablet by mouth daily, Disp: 90 tablet, Rfl: 1    DAR 0.05 MG/24HR, Place 1 patch onto the skin Twice a Week, Disp: 24 patch, Rfl: 6    pravastatin (PRAVACHOL) 40 MG tablet, TAKE 1 TABLET BY MOUTH ONE TIME A DAY, Disp: 90 tablet, Rfl: 1      Review of Systems    Vitals:    12/04/20 1010   BP: 123/81   Pulse: 61   SpO2: 95%   Weight: 223 lb (101.2 kg)   Height: 5' 7\" (1.702 m)       Objective:   Physical Exam  Vitals signs reviewed. Constitutional:       Appearance: Normal appearance. She is obese. HENT:      Head: Normocephalic and atraumatic. Neck:      Musculoskeletal: Normal range of motion.    Cardiovascular: Rate and Rhythm: Normal rate. Musculoskeletal: Normal range of motion. Neurological:      General: No focal deficit present. Mental Status: She is alert and oriented to person, place, and time. Assessment:       Diagnosis Orders   1. Hypothyroidism, unspecified type  T4, Free    TSH with Reflex   2.  Hyperlipidemia, unspecified hyperlipidemia type  Basic Metabolic Panel    Lipid Panel           Plan:      Orders Placed This Encounter   Procedures    T4, Free     Standing Status:   Future     Standing Expiration Date:   12/4/2021    TSH with Reflex     Standing Status:   Future     Standing Expiration Date:   12/4/2021    Basic Metabolic Panel     Standing Status:   Future     Standing Expiration Date:   12/4/2021    Lipid Panel     Standing Status:   Future     Standing Expiration Date:   12/4/2021     Order Specific Question:   Is Patient Fasting?/# of Hours     Answer:   y     Continue levothyroxine 137 mcg daily continue Pravachol follow-up in 3 to 6 months time  Orders Placed This Encounter   Medications    levothyroxine (SYNTHROID) 137 MCG tablet     Sig: Take 1 tablet by mouth daily     Dispense:  90 tablet     Refill:  1    pravastatin (PRAVACHOL) 40 MG tablet     Sig: TAKE 1 TABLET BY MOUTH ONE TIME A DAY     Dispense:  90 tablet     Refill:  1             Tiffani Bell MD

## 2021-01-07 ENCOUNTER — OFFICE VISIT (OUTPATIENT)
Dept: OBGYN CLINIC | Age: 66
End: 2021-01-07
Payer: MEDICARE

## 2021-01-07 VITALS
DIASTOLIC BLOOD PRESSURE: 70 MMHG | SYSTOLIC BLOOD PRESSURE: 142 MMHG | HEART RATE: 79 BPM | BODY MASS INDEX: 35.55 KG/M2 | WEIGHT: 227 LBS

## 2021-01-07 DIAGNOSIS — E03.9 HYPOTHYROIDISM, UNSPECIFIED TYPE: ICD-10-CM

## 2021-01-07 DIAGNOSIS — Z78.0 POSTMENOPAUSAL: Primary | ICD-10-CM

## 2021-01-07 PROCEDURE — G0101 CA SCREEN;PELVIC/BREAST EXAM: HCPCS | Performed by: OBSTETRICS & GYNECOLOGY

## 2021-01-07 PROCEDURE — G8484 FLU IMMUNIZE NO ADMIN: HCPCS | Performed by: OBSTETRICS & GYNECOLOGY

## 2021-01-07 ASSESSMENT — ENCOUNTER SYMPTOMS
CONSTIPATION: 0
BACK PAIN: 0
COLOR CHANGE: 0
VOMITING: 0
VOICE CHANGE: 0
SORE THROAT: 0
ABDOMINAL PAIN: 0
NAUSEA: 0
SHORTNESS OF BREATH: 0
TROUBLE SWALLOWING: 0
BLOOD IN STOOL: 0
ABDOMINAL DISTENTION: 0
COUGH: 0
CHEST TIGHTNESS: 0
WHEEZING: 0

## 2021-01-07 NOTE — PROGRESS NOTES
CHIEF COMPLAINT: Clara Ryan is 60 soon-to-be 73 she just recently retired. She is doing very well. She is presently using estrogen patches and is doing well she changes 1 every 15 days and is not having any symptoms. She denies any bladder or bowel symptoms. She has no vaginal discomfort with intercourse no vaginitis or bleeding. No changes in her medical or surgical history  Chief Complaint   Patient presents with    Annual Exam     left sided breast pain         HISTORY OF PRESENT ILLNESS:  60 y.o. female presents for her annual exam. She had no concernsor complaints today. Her menses is  absent. She denies breakthrough bleeding, pelvic pain, or abnormal discharge. Bowel and bladder function is normal. Her health overallhas been good.      Past Medical History:   Diagnosis Date    Arthritis     both knees    Hyperlipidemia     meds > 10 yrs    Hypertension     meds > 4 yrs    Hyperthyroidism     Hypothyroidism     meds > 20 yrs    Thyroid goiter      Past Surgical History:   Procedure Laterality Date    APPENDECTOMY  1981    with left oophorectomy    BREAST BIOPSY Left 2012    x 2 / both benign    CHOLECYSTECTOMY  2008    COLONOSCOPY  11-16-07    COLONOSCOPY  [id number]    Mary Carreon (polyps)    COLONOSCOPY  2/12/16    w/polypectomy     COLONOSCOPY N/A 2/18/2019    COLORECTAL CANCER SCREENING, HIGH RISK performed by Iris Weinstein MD at [place] [address]. Left 1/8/2020    FLEXIBLE CYSTOSCOPY LEFT DOUBLE J STENT REMOVAL (RECENT SURGERY 12/23/19) KUB ON ARRIVAL performed by Aurelio Biswas MD at [address] / [place] / STONE Left 12/23/2019    CYSTOSCOPY LEFT RETROGRADE PYELOGRAM  LEFT DOUBLE J STENT performed by Sidra Lawton MD at [facility]. [address], COLON, DIAGNOSTIC     [address] REPLACEMENT  06/2019    LTKR    KNEE ARTHROSCOPY Bilateral 1987 2003 1994    left X2 and Right X1    LITHOTRIPSY Left 12/23/2019 LEFT ESWL performed by Risa Robison MD at [address] [facility] Left 2019    LEFT TOTAL KNEE REPLACEMENT- [address] performed by Carla Rosas MD at [address] History   Problem Relation Age of Onset    Heart Disease Mother     Cancer Mother         RENAL CELL    COPD Mother     Diabetes Other     Breast Cancer Other     Colon Cancer Father     Macular Degen Father     Cancer Brother         prostate cancer    No Known Problems Son      Social History     Socioeconomic History    Marital status:      Spouse name: Not on file    Number of children: Not on file    Years of education: Not on file    Highest education level: Not on file   Occupational History    Not on file   Social Needs    Financial resource strain: Not on file    Food insecurity     Worry: Not on file     Inability: Not on file   [employer] needs     Medical: Not on file     Non-medical: Not on file   Tobacco Use    Smoking status: Former Smoker     Packs/day: 0.25     Years: 5.00     Pack years: 1.25     Types: Cigarettes     Quit date:      Years since quittin.0    Smokeless tobacco: Never Used   Substance and Sexual Activity    Alcohol use: Yes     Comment: occ.     Drug use: No     Comment: CBD oil to left knee    Sexual activity: Not on file   Lifestyle    Physical activity     Days per week: Not on file     Minutes per session: Not on file    Stress: Not on file   Relationships    Social connections     Talks on phone: Not on file     Gets together: Not on file     Attends [employer] service: Not on file     Active member of club or organization: Not on file     Attends meetings of clubs or organizations: Not on file     Relationship status: Not on file    Intimate partner violence     Fear of current or ex partner: Not on file     Emotionally abused: Not on file     Physically abused: Not on file     Forced sexual activity: Not on file   Other Topics Concern    Not on file   Social History Narrative    Not on file     Allergies:  Antiseptic products, misc. Current Outpatient Medications on File Prior to Visit   Medication Sig Dispense Refill    levothyroxine (SYNTHROID) 137 MCG tablet Take 1 tablet by mouth daily 90 tablet 1    pravastatin (PRAVACHOL) 40 MG tablet TAKE 1 TABLET BY MOUTH ONE TIME A DAY 90 tablet 1    bisoprolol (ZEBETA) 5 MG tablet Take 1 tablet by mouth daily 90 tablet 1    DAR 0.05 MG/24HR Place 1 patch onto the skin Twice a Week 24 patch 6     No current facility-administered medications on file prior to visit. OB History    No obstetric history on file. Patient's medications, allergies, past medical, surgical,social and family histories were reviewed and updated as appropriate. Review of Systems   Constitutional: Negative for activity change, appetite change, fatigue and unexpected weight change. HENT: Negative for dental problem, ear pain, hearing loss, nosebleeds, sore throat, trouble swallowing and voice change. Eyes: Negative for visual disturbance. Respiratory: Negative for cough, chest tightness, shortness of breath and wheezing. Cardiovascular: Negative for chest pain and palpitations. Gastrointestinal: Negative for abdominal distention, abdominal pain, blood in stool, constipation, nausea and vomiting. Endocrine: Negative for cold intolerance, heat intolerance, polydipsia, polyphagia and polyuria. Genitourinary: Negative for difficulty urinating, dyspareunia, dysuria, flank pain, frequency, genital sores, hematuria, menstrual problem, pelvic pain, urgency, vaginal bleeding, vaginal discharge and vaginal pain. Musculoskeletal: Negative for arthralgias, back pain, joint swelling and myalgias. Skin: Negative for color change and rash. Allergic/Immunologic: Negative for environmental allergies, food allergies and immunocompromised state.    Neurological: Negative for dizziness, seizures, syncope, speech difficulty, weakness, numbness and headaches. Hematological: Negative for adenopathy. Does not bruise/bleed easily. Psychiatric/Behavioral: Negative for agitation, behavioral problems, confusion, decreased concentration, dysphoric mood and suicidal ideas. The patient is not nervous/anxious and is not hyperactive. All other systems reviewed and are negative. PHYSICAL EXAM  BP (!) 142/70   Pulse 79   Wt 227 lb (103 kg)   LMP 09/27/1998 (Approximate)   BMI 35.55 kg/m²      Physical Exam  Vitals signs reviewed. Exam conducted with a chaperone present. Constitutional:       Appearance: Normal appearance. She is well-developed. HENT:      Head: Normocephalic and atraumatic. Eyes:      Pupils: Pupils are equal, round, and reactive to light. Neck:      Musculoskeletal: Normal range of motion. Thyroid: No thyromegaly. Trachea: No tracheal deviation. Cardiovascular:      Rate and Rhythm: Normal rate and regular rhythm. Heart sounds: Normal heart sounds. Pulmonary:      Effort: Pulmonary effort is normal.      Breath sounds: Normal breath sounds. Chest:      Chest wall: No tenderness. Abdominal:      General: Bowel sounds are normal. There is no distension. Palpations: Abdomen is soft. There is no mass. Tenderness: There is no abdominal tenderness. There is no guarding or rebound. Hernia: There is no hernia in the left inguinal area. Genitourinary:     Labia:         Right: No rash, tenderness, lesion or injury. Left: No rash, tenderness, lesion or injury. Vagina: Normal. No foreign body. No vaginal discharge, erythema, tenderness or bleeding. Adnexa:         Right: No mass, tenderness or fullness. Left: No mass, tenderness or fullness. Rectum: Normal. No mass, tenderness, anal fissure, external hemorrhoid or internal hemorrhoid. Normal anal tone. Musculoskeletal: Normal range of motion.    Lymphadenopathy: Cervical: No cervical adenopathy. Neurological:      Mental Status: She is alert and oriented to person, place, and time. ASSESSMENT : Routine Annual Exam      Diagnosis Orders   1. Postmenopausal  DEXA BONE DENSITY AXIAL SKELETON   2. Hypothyroidism, unspecified type  DEXA BONE DENSITY AXIAL SKELETON       PLAN: Density. Continue with the estrogen patch. Patient to follow-up in 1 year or as needed  Pap smear  Not obtained  No orders of the defined types were placed in this encounter. No orders of the defined types were placed in this encounter. Repeat Annual every 1 year. New ASCCP guidelines regarding pap and Hi Risk HPV co-testing reviewed. Cervical Cytology Evaluation begins at 24years old. If Negative Cytology, Follow-upscreening per current  ASCCP guidelines. Mammograms every 1 year. If 37 yo and last mammogram was negative. Calcium and Vitamin D dosing reviewed. Colonoscopy screening guidelines reviewed as well as onset forbone density testing. Birth control and barrier methods and recommendations discussed. STD counseling and prevention reviewed. Gardisil counseling completed for all patients 7-33 yo. Routine health maintenance per patients PCP.        Electronically signed by Gordy Torre DO on 1/7/21

## 2021-01-26 RX ORDER — BISOPROLOL FUMARATE 5 MG/1
5 TABLET ORAL DAILY
Qty: 90 TABLET | Refills: 1 | Status: SHIPPED | OUTPATIENT
Start: 2021-01-26 | End: 2021-03-05

## 2021-01-27 ENCOUNTER — TELEPHONE (OUTPATIENT)
Dept: FAMILY MEDICINE CLINIC | Age: 66
End: 2021-01-27

## 2021-03-05 ENCOUNTER — OFFICE VISIT (OUTPATIENT)
Dept: FAMILY MEDICINE CLINIC | Age: 66
End: 2021-03-05
Payer: MEDICARE

## 2021-03-05 VITALS
RESPIRATION RATE: 16 BRPM | OXYGEN SATURATION: 95 % | WEIGHT: 225 LBS | DIASTOLIC BLOOD PRESSURE: 80 MMHG | BODY MASS INDEX: 35.31 KG/M2 | HEART RATE: 69 BPM | SYSTOLIC BLOOD PRESSURE: 130 MMHG | HEIGHT: 67 IN

## 2021-03-05 DIAGNOSIS — R73.9 HYPERGLYCEMIA: ICD-10-CM

## 2021-03-05 DIAGNOSIS — R73.09 ELEVATED HEMOGLOBIN A1C: Primary | ICD-10-CM

## 2021-03-05 DIAGNOSIS — E78.5 HYPERLIPIDEMIA, UNSPECIFIED HYPERLIPIDEMIA TYPE: ICD-10-CM

## 2021-03-05 DIAGNOSIS — E03.9 HYPOTHYROIDISM, UNSPECIFIED TYPE: ICD-10-CM

## 2021-03-05 DIAGNOSIS — I10 ESSENTIAL HYPERTENSION: ICD-10-CM

## 2021-03-05 DIAGNOSIS — E66.09 CLASS 1 OBESITY DUE TO EXCESS CALORIES WITH SERIOUS COMORBIDITY AND BODY MASS INDEX (BMI) OF 34.0 TO 34.9 IN ADULT: ICD-10-CM

## 2021-03-05 LAB
ANION GAP SERPL CALCULATED.3IONS-SCNC: 13 MEQ/L (ref 9–15)
BUN BLDV-MCNC: 16 MG/DL (ref 8–23)
CALCIUM SERPL-MCNC: 9.3 MG/DL (ref 8.5–9.9)
CHLORIDE BLD-SCNC: 102 MEQ/L (ref 95–107)
CHOLESTEROL, TOTAL: 184 MG/DL (ref 0–199)
CO2: 23 MEQ/L (ref 20–31)
CREAT SERPL-MCNC: 1.05 MG/DL (ref 0.5–0.9)
GFR AFRICAN AMERICAN: >60
GFR NON-AFRICAN AMERICAN: 52.6
GLUCOSE BLD-MCNC: 111 MG/DL (ref 70–99)
HBA1C MFR BLD: 5.9 %
HDLC SERPL-MCNC: 31 MG/DL (ref 40–59)
LDL CHOLESTEROL CALCULATED: 109 MG/DL (ref 0–129)
POTASSIUM SERPL-SCNC: 4.6 MEQ/L (ref 3.4–4.9)
SODIUM BLD-SCNC: 138 MEQ/L (ref 135–144)
T4 FREE: 1.33 NG/DL (ref 0.84–1.68)
TRIGL SERPL-MCNC: 221 MG/DL (ref 0–150)
TSH REFLEX: 9.93 UIU/ML (ref 0.44–3.86)

## 2021-03-05 PROCEDURE — 1036F TOBACCO NON-USER: CPT | Performed by: INTERNAL MEDICINE

## 2021-03-05 PROCEDURE — 3017F COLORECTAL CA SCREEN DOC REV: CPT | Performed by: INTERNAL MEDICINE

## 2021-03-05 PROCEDURE — 83036 HEMOGLOBIN GLYCOSYLATED A1C: CPT | Performed by: INTERNAL MEDICINE

## 2021-03-05 PROCEDURE — 1123F ACP DISCUSS/DSCN MKR DOCD: CPT | Performed by: INTERNAL MEDICINE

## 2021-03-05 PROCEDURE — G8427 DOCREV CUR MEDS BY ELIG CLIN: HCPCS | Performed by: INTERNAL MEDICINE

## 2021-03-05 PROCEDURE — G8399 PT W/DXA RESULTS DOCUMENT: HCPCS | Performed by: INTERNAL MEDICINE

## 2021-03-05 PROCEDURE — 4040F PNEUMOC VAC/ADMIN/RCVD: CPT | Performed by: INTERNAL MEDICINE

## 2021-03-05 PROCEDURE — G8484 FLU IMMUNIZE NO ADMIN: HCPCS | Performed by: INTERNAL MEDICINE

## 2021-03-05 PROCEDURE — 99213 OFFICE O/P EST LOW 20 MIN: CPT | Performed by: INTERNAL MEDICINE

## 2021-03-05 PROCEDURE — 1090F PRES/ABSN URINE INCON ASSESS: CPT | Performed by: INTERNAL MEDICINE

## 2021-03-05 PROCEDURE — G8417 CALC BMI ABV UP PARAM F/U: HCPCS | Performed by: INTERNAL MEDICINE

## 2021-03-05 RX ORDER — AMLODIPINE BESYLATE 5 MG/1
5 TABLET ORAL DAILY
Qty: 30 TABLET | Refills: 3 | Status: SHIPPED | OUTPATIENT
Start: 2021-03-05 | End: 2021-06-21

## 2021-03-05 ASSESSMENT — PATIENT HEALTH QUESTIONNAIRE - PHQ9
2. FEELING DOWN, DEPRESSED OR HOPELESS: 0
SUM OF ALL RESPONSES TO PHQ QUESTIONS 1-9: 0

## 2021-03-05 ASSESSMENT — ENCOUNTER SYMPTOMS
FACIAL SWELLING: 0
SINUS PAIN: 0
PHOTOPHOBIA: 0
EYE DISCHARGE: 0
BACK PAIN: 0
APNEA: 0
VOMITING: 0
COLOR CHANGE: 0
TROUBLE SWALLOWING: 0
EYE ITCHING: 0
SINUS PRESSURE: 0
VOICE CHANGE: 0
NAUSEA: 0
EYE REDNESS: 0
RHINORRHEA: 0
CONSTIPATION: 0
ABDOMINAL DISTENTION: 0
RECTAL PAIN: 0
SORE THROAT: 0
DIARRHEA: 0
WHEEZING: 0
SHORTNESS OF BREATH: 0
ABDOMINAL PAIN: 0
BLOOD IN STOOL: 0
CHEST TIGHTNESS: 0
EYE PAIN: 0
COUGH: 0

## 2021-03-05 NOTE — PROGRESS NOTES
3/5/2021    Katy James (:  1955) is a 73 y.o. female, here for evaluation of the following medical concerns:    HPI  46-year-old female with a history of essential hypertension, hypothyroidism and hyperlipidemia for follow-up visit. Hyperglycemia: Hemoglobin A1c was obtained on 2020. It was 5.8. It is 5.9 today. Hypertension: In regards to hypertension the patient is compliant with bisoprolol 5 mg orally daily. She is experiencing headaches which she feels is attributable to bisoprolol. Menopausal symptoms: The patient symptoms are well controlled via estradiol which she takes        Hyperlipidemia: In regards to her hyperlipidemia the patient is compliant with pravastatin 40 mg orally daily        Hypothyroidism: In regards to her hypothyroidism the patient is compliant with Synthroid 150 mcg daily              Review of Systems   Constitutional: Negative for chills, diaphoresis, fatigue and fever. HENT: Negative for congestion, dental problem, drooling, ear discharge, ear pain, facial swelling, hearing loss, mouth sores, nosebleeds, postnasal drip, rhinorrhea, sinus pressure, sinus pain, sneezing, sore throat, tinnitus, trouble swallowing and voice change. Eyes: Negative for photophobia, pain, discharge, redness, itching and visual disturbance. Respiratory: Negative for apnea, cough, chest tightness, shortness of breath and wheezing. Cardiovascular: Negative for chest pain, palpitations and leg swelling. Gastrointestinal: Negative for abdominal distention, abdominal pain, blood in stool, constipation, diarrhea, nausea, rectal pain and vomiting. Endocrine: Negative for cold intolerance, heat intolerance, polydipsia, polyphagia and polyuria. Genitourinary: Negative for decreased urine volume, difficulty urinating, dysuria, flank pain, frequency, genital sores, hematuria and urgency.    Musculoskeletal: Negative for arthralgias, back pain, gait problem, joint swelling, myalgias, neck pain and neck stiffness. Skin: Negative for color change, rash and wound. Allergic/Immunologic: Negative for environmental allergies and food allergies. Neurological: Negative for dizziness, tremors, seizures, syncope, facial asymmetry, speech difficulty, weakness, light-headedness, numbness and headaches. Hematological: Negative for adenopathy. Does not bruise/bleed easily. Psychiatric/Behavioral: Negative for agitation, confusion, decreased concentration, hallucinations, self-injury, sleep disturbance and suicidal ideas. The patient is not nervous/anxious. Prior to Visit Medications    Medication Sig Taking? Authorizing Provider   bisoprolol (ZEBETA) 5 MG tablet Take 1 tablet by mouth daily  Rosa Pierce MD   levothyroxine (SYNTHROID) 137 MCG tablet Take 1 tablet by mouth daily  Rosa Pierce MD   pravastatin (PRAVACHOL) 40 MG tablet TAKE 1 TABLET BY MOUTH ONE TIME A DAY  Jose Alejandro Grossman MD   DAR 0.05 MG/24HR Place 1 patch onto the skin Twice a Week  Catherine Dana, DO        Allergies   Allergen Reactions    Antiseptic Products, Misc.  Rash     duraprep       Past Medical History:   Diagnosis Date    Arthritis     both knees    Hyperlipidemia     meds > 10 yrs    Hypertension     meds > 4 yrs    Hyperthyroidism     Hypothyroidism     meds > 20 yrs    Thyroid goiter        Past Surgical History:   Procedure Laterality Date    APPENDECTOMY  1981    with left oophorectomy    BREAST BIOPSY Left 2012    x 2 / both benign    CHOLECYSTECTOMY  2008    COLONOSCOPY  11-16-07    COLONOSCOPY  518101    Cierra Carreon (polyps)    COLONOSCOPY  2/12/16    w/polypectomy     COLONOSCOPY N/A 2/18/2019    COLORECTAL CANCER SCREENING, HIGH RISK performed by Scott Conklin MD at [address] Left 1/8/2020    FLEXIBLE CYSTOSCOPY LEFT DOUBLE J STENT REMOVAL (RECENT SURGERY 12/23/19) KUB ON ARRIVAL performed by Daya Mari MD at [facility]  CYSTOSCOPY INSERTION / REMOVAL STENT / STONE Left 2019    CYSTOSCOPY LEFT RETROGRADE PYELOGRAM  LEFT DOUBLE J STENT performed by Dicie Habermann, MD at [facility]. [address], COLON, DIAGNOSTIC     [address]    JOINT REPLACEMENT  2019    LTKR    KNEE ARTHROSCOPY Bilateral 1987 2003    left X2 and Right X1    LITHOTRIPSY Left 2019    LEFT ESWL performed by Dicie Habermann, MD at [address] [facility] Left 2019    LEFT TOTAL KNEE REPLACEMENT- [address] performed by Niki Bullock MD at [facility] [address] History     Socioeconomic History    Marital status:      Spouse name: Not on file    Number of children: Not on file    Years of education: Not on file    Highest education level: Not on file   Occupational History    Not on file   Social Needs    Financial resource strain: Not on file    Food insecurity     Worry: Not on file     Inability: Not on file    Transportation needs     Medical: Not on file     Non-medical: Not on file   Tobacco Use    Smoking status: Former Smoker     Packs/day: 0.25     Years: 5.00     Pack years: 1.25     Types: Cigarettes     Quit date:      Years since quittin.2    Smokeless tobacco: Never Used   Substance and Sexual Activity    Alcohol use: Yes     Comment: occ.     Drug use: No     Comment: CBD oil to left knee    Sexual activity: Not on file   Lifestyle    Physical activity     Days per week: Not on file     Minutes per session: Not on file    Stress: Not on file   Relationships    Social connections     Talks on phone: Not on file     Gets together: Not on file     Attends Anglican service: Not on file     Active member of club or organization: Not on file     Attends meetings of clubs or organizations: Not on file     Relationship status: Not on file    Intimate partner violence     Fear of current or ex partner: Not on file     Emotionally abused: Not on file     Physically abused: Not on file     Forced sexual activity: Not on file   Other Topics Concern    Not on file   Social History Narrative    Not on file        Family History   Problem Relation Age of Onset    Heart Disease Mother     Cancer Mother         RENAL CELL    COPD Mother     Diabetes Other     Breast Cancer Other     Colon Cancer Father     Macular Degen Father     Cancer Brother         prostate cancer    No Known Problems Son        There were no vitals filed for this visit. Estimated body mass index is 35.55 kg/m² as calculated from the following:    Height as of 12/4/20: 5' 7\" (1.702 m). Weight as of 1/7/21: 227 lb (103 kg). Physical Exam  Constitutional:       General: She is not in acute distress. Appearance: She is well-developed. HENT:      Head: Normocephalic. Right Ear: External ear normal.      Left Ear: External ear normal.   Eyes:      Conjunctiva/sclera: Conjunctivae normal.   Neck:      Musculoskeletal: Neck supple. Vascular: No JVD. Trachea: No tracheal deviation. Cardiovascular:      Rate and Rhythm: Normal rate and regular rhythm. Heart sounds: Normal heart sounds. Pulmonary:      Effort: Pulmonary effort is normal. No respiratory distress. Breath sounds: Normal breath sounds. No wheezing or rales. Chest:      Chest wall: No tenderness. Abdominal:      General: Bowel sounds are normal. There is no distension. Palpations: Abdomen is soft. There is no mass. Tenderness: There is no abdominal tenderness. There is no guarding or rebound. Musculoskeletal:         General: No tenderness or deformity. Lymphadenopathy:      Cervical: No cervical adenopathy. Skin:     General: Skin is warm and dry. Coloration: Skin is not pale. Findings: No erythema or rash. Neurological:      Mental Status: She is alert and oriented to person, place, and time. Motor: No abnormal muscle tone. Psychiatric:         Thought Content:  Thought content normal.         Judgment: Judgment normal.         ASSESSMENT/PLAN:  1. Hyperglycemia  -Encouraged aerobic exercise daily. ds. 2. Essential hypertension  discontinue bisoprolol 5 mg orally daily. Start Norvasc 5 mg po dailydue to headaches. 3. Hypothyroidism, unspecified type  Continue Synthroid 150 mcg daily      4. Hyperlipidemia, unspecified hyperlipidemia typelipid panel has been ordered by the patient's endocrinologist.  Will await the results of the study. 5. Class 1 obesity due to excess calories with serious comorbidity and body mass index (BMI)35.2 as stated per #1. 6.  Postmenopausal symptomscontinue estradiol. No follow-ups on file. An  electronic signature was used to authenticate this note.     --Misael Decker MD on 3/5/2021 at 8:31 AM

## 2021-03-11 DIAGNOSIS — E03.9 HYPOTHYROIDISM, UNSPECIFIED TYPE: Primary | ICD-10-CM

## 2021-03-11 RX ORDER — LEVOTHYROXINE SODIUM 0.15 MG/1
150 TABLET ORAL DAILY
Qty: 90 TABLET | Refills: 1 | Status: SHIPPED | OUTPATIENT
Start: 2021-03-11 | End: 2021-07-09

## 2021-04-30 ENCOUNTER — HOSPITAL ENCOUNTER (OUTPATIENT)
Dept: NEUROLOGY | Age: 66
Discharge: HOME | End: 2021-04-30
Payer: MEDICARE

## 2021-04-30 DIAGNOSIS — G56.01 CARPAL TUNNEL SYNDROME ON RIGHT: ICD-10-CM

## 2021-04-30 PROCEDURE — 95910 NRV CNDJ TEST 7-8 STUDIES: CPT

## 2021-04-30 PROCEDURE — 95886 MUSC TEST DONE W/N TEST COMP: CPT

## 2021-04-30 PROCEDURE — 64616 CHEMODENERV MUSC NECK DYSTON: CPT

## 2021-04-30 NOTE — PROCEDURES
[address]                      [address], [address] [facility]                             ELECTROMYOGRAM REPORT    PATIENT NAME: Claire Brito                      :        1955  MED REC NO:   [id number]                            ROOM:  ACCOUNT NO:   [de-identified]                           ADMIT DATE: 2021  PROVIDER:     Mello Soni MD    DATE OF EM2021    REFERRING PROVIDER:  Erick Nava MD.    REASON FOR STUDY:  The patient was having  numbness in the right  hand mostly. FINDINGS:  Motor nerve conduction velocities are normal in all the  nerves tested. F-wave latency is delayed in the right median nerve, but normal in other  nerves tested. Distal motor and sensory latencies are normal in the ulnar nerves, but  delayed in the median nerves, being much worse on the right side. On concentric needle electrode examination, mild denervation changes are  present in the abductor pollicis brevis muscles bilaterally. CLINICAL INTERPRETATION:  EMG studies are showin. Severe right median nerve compression neuropathy at the wrist  consistent with diagnosis of severe right carpal tunnel syndrome. 2.  Moderate-to-severe left median nerve compression neuropathy at the  wrist consistent with diagnosis of moderate-to-severe left carpal tunnel  syndrome. Due to continued symptoms, the patient shall need decompression of the  right median nerve to start with. Thank you Dr. José Miguel Bran for allowing me to see this patient. Please  feel free to call me if I can be of any further assistance regarding  this patient's evaluation.         Martina Javed MD    D: 2021 14:28:08       T: 2021 14:40:17     [email or web address]  Job#: [id number]     Doc#: [id number]    CC:

## 2021-05-06 ENCOUNTER — VIRTUAL VISIT (OUTPATIENT)
Dept: FAMILY MEDICINE CLINIC | Age: 66
End: 2021-05-06
Payer: MEDICARE

## 2021-05-06 DIAGNOSIS — Z00.00 ROUTINE GENERAL MEDICAL EXAMINATION AT A HEALTH CARE FACILITY: Primary | ICD-10-CM

## 2021-05-06 PROCEDURE — G0402 INITIAL PREVENTIVE EXAM: HCPCS | Performed by: INTERNAL MEDICINE

## 2021-05-06 PROCEDURE — 3017F COLORECTAL CA SCREEN DOC REV: CPT | Performed by: INTERNAL MEDICINE

## 2021-05-06 PROCEDURE — 1123F ACP DISCUSS/DSCN MKR DOCD: CPT | Performed by: INTERNAL MEDICINE

## 2021-05-06 PROCEDURE — 4040F PNEUMOC VAC/ADMIN/RCVD: CPT | Performed by: INTERNAL MEDICINE

## 2021-05-06 ASSESSMENT — LIFESTYLE VARIABLES
HOW OFTEN DURING THE LAST YEAR HAVE YOU NEEDED AN ALCOHOLIC DRINK FIRST THING IN THE MORNING TO GET YOURSELF GOING AFTER A NIGHT OF HEAVY DRINKING: NEVER
HOW OFTEN DURING THE LAST YEAR HAVE YOU FOUND THAT YOU WERE NOT ABLE TO STOP DRINKING ONCE YOU HAD STARTED: NEVER
AUDIT TOTAL SCORE: 0
HOW OFTEN DO YOU HAVE SIX OR MORE DRINKS ON ONE OCCASION: NEVER
HAVE YOU OR SOMEONE ELSE BEEN INJURED AS A RESULT OF YOUR DRINKING: 0
HOW OFTEN DURING THE LAST YEAR HAVE YOU BEEN UNABLE TO REMEMBER WHAT HAPPENED THE NIGHT BEFORE BECAUSE YOU HAD BEEN DRINKING: NEVER
AUDIT-C TOTAL SCORE: 0
HAVE YOU OR SOMEONE ELSE BEEN INJURED AS A RESULT OF YOUR DRINKING: NO
HOW OFTEN DURING THE LAST YEAR HAVE YOU NEEDED AN ALCOHOLIC DRINK FIRST THING IN THE MORNING TO GET YOURSELF GOING AFTER A NIGHT OF HEAVY DRINKING: 0
HOW MANY STANDARD DRINKS CONTAINING ALCOHOL DO YOU HAVE ON A TYPICAL DAY: ONE OR TWO
HOW OFTEN DURING THE LAST YEAR HAVE YOU FAILED TO DO WHAT WAS NORMALLY EXPECTED FROM YOU BECAUSE OF DRINKING: NEVER
HOW OFTEN DO YOU HAVE A DRINK CONTAINING ALCOHOL: TWO TO FOUR TIMES A MONTH
HOW OFTEN DURING THE LAST YEAR HAVE YOU HAD A FEELING OF GUILT OR REMORSE AFTER DRINKING: NEVER
HAS A RELATIVE, FRIEND, DOCTOR, OR ANOTHER HEALTH PROFESSIONAL EXPRESSED CONCERN ABOUT YOUR DRINKING OR SUGGESTED YOU CUT DOWN: NO
HOW OFTEN DURING THE LAST YEAR HAVE YOU HAD A FEELING OF GUILT OR REMORSE AFTER DRINKING: 0

## 2021-05-06 ASSESSMENT — PATIENT HEALTH QUESTIONNAIRE - PHQ9
2. FEELING DOWN, DEPRESSED OR HOPELESS: 0
1. LITTLE INTEREST OR PLEASURE IN DOING THINGS: 0
SUM OF ALL RESPONSES TO PHQ9 QUESTIONS 1 & 2: 0

## 2021-05-06 NOTE — PROGRESS NOTES
Medicare Annual Wellness Visit  Are Name: Sola Blevins Date: 2021   MRN: [id number] Sex: Female   Age: 73 y.o. Ethnicity: Non-/Non    : 1955 Race: Sandra Rice is here for No chief complaint on file. Screenings for behavioral, psychosocial and functional/safety risks, and cognitive dysfunction are all negative except as indicated below. These results, as well as other patient data from the [address] form, are documented in Flowsheets linked to this Encounter. Allergies   Allergen Reactions    Antiseptic Products, Misc. Rash     duraprep       Prior to Visit Medications    Medication Sig Taking?  Authorizing Provider   levothyroxine (SYNTHROID) 150 MCG tablet Take 1 tablet by mouth Daily  Jose Alejandro Zimmerman MD   amLODIPine (NORVASC) 5 MG tablet Take 1 tablet by mouth daily  Kristi Jones MD   pravastatin (PRAVACHOL) 40 MG tablet TAKE 1 TABLET BY MOUTH ONE TIME A DAY  Jose Alejandro Silverio MD   DAR 0.05 MG/24HR Place 1 patch onto the skin Twice a Week  Roxie Will DO       Past Medical History:   Diagnosis Date    Arthritis     both knees    Hyperlipidemia     meds > 10 yrs    Hypertension     meds > 4 yrs    Hyperthyroidism     Hypothyroidism     meds > 20 yrs    Thyroid goiter        Past Surgical History:   Procedure Laterality Date    APPENDECTOMY      with left oophorectomy    BREAST BIOPSY Left 2012    x 2 / both benign    CHOLECYSTECTOMY      COLONOSCOPY  07    COLONOSCOPY  [id number]    Tiburcio Carreon (polyps)    COLONOSCOPY  16    w/polypectomy     COLONOSCOPY N/A 2019    COLORECTAL CANCER SCREENING, HIGH RISK performed by Leena Trinidad MD at [address] Left 2020    FLEXIBLE CYSTOSCOPY LEFT DOUBLE J STENT REMOVAL (RECENT SURGERY 19) KUB ON ARRIVAL performed by Papo Carballo MD at [address] [place] [address] / Sharif Herrera / JORGE Left 2019    CYSTOSCOPY LEFT RETROGRADE PYELOGRAM  LEFT DOUBLE J STENT performed by Kenya Bernal MD at [address] [facility] [address], DIAGNOSTIC     [address] REPLACEMENT  06/2019    LTKR    KNEE ARTHROSCOPY Bilateral 1987 2003 1994    left X2 and Right X1    LITHOTRIPSY Left 12/23/2019    LEFT ESWL performed by Kenya Bernal MD at [address] Left 6/19/2019    LEFT TOTAL KNEE REPLACEMENT- DEPUY performed by Endy Tovar MD at [facility] History   Problem Relation Age of Onset    Heart Disease Mother     Cancer Mother         RENAL CELL    COPD Mother     Diabetes Other     Breast Cancer Other     Colon Cancer Father     Macular Degen Father     Cancer Brother         prostate cancer    No Known Problems Son        CareTeam (Including outside providers/suppliers regularly involved in providing care):   Patient Care Team:  Conchita Casper MD as PCP - General (Internal Medicine)  Conchita Casper MD as PCP - [facility] Empaneled Provider  Kenya Bernal MD (Urology)    Wt Readings from Last 3 Encounters:   03/05/21 225 lb (102.1 kg)   01/07/21 227 lb (103 kg)   12/04/20 223 lb (101.2 kg)      Patient-Reported Vitals 7/21/2020   Patient-Reported Weight 220   Patient-Reported Height 57   Patient-Reported Systolic 644   Patient-Reported Diastolic 73   Patient-Reported Pulse 66   Patient-Reported Temperature 97.6   Patient-Reported SpO2 98      There is no height or weight on file to calculate BMI. Based upon direct observation of the patient, evaluation of cognition reveals recent and remote memory intact. Patient's complete Health Risk Assessment and screening values have been reviewed and are found in Flowsheets. The following problems were reviewed today and where indicated follow up appointments were made and/or referrals ordered.     Positive Risk Factor Screenings with Interventions:           Health Habits/Nutrition:  Health Habits/Nutrition Do you exercise for at least 20 minutes 2-3 times per week?: Yes  Have you lost any weight without trying in the past 3 months?: No  Do you eat only one meal per day?: No  Have you seen the dentist within the past year?: Yes     Health Habits/Nutrition Interventions:  · none       Personalized Preventive Plan   Current Health Maintenance Status  Immunization History   Administered Date(s) Administered    COVID-19, Moderna, PF, 100mcg/0.5mL 02/25/2021, 03/25/2021    Influenza Virus Vaccine 10/04/2018, 11/20/2019        Health Maintenance   Topic Date Due    DTaP/Tdap/Td vaccine (1 - Tdap) Never done    Shingles Vaccine (1 of 2) Never done    Pneumococcal 65+ years Vaccine (1 of 1 - PPSV23) Never done   [employer] Visit (AWV)  Never done    Flu vaccine (Season Ended) 09/01/2021    Colon cancer screen colonoscopy  02/18/2022    A1C test (Diabetic or Prediabetic)  03/05/2022    Lipid screen  03/05/2022    TSH testing  03/05/2022    Cervical cancer screen  10/29/2022    Breast cancer screen  11/13/2022    DEXA (modify frequency per FRAX score)  Completed    COVID-19 Vaccine  Completed    Hepatitis C screen  Completed    Hepatitis A vaccine  Aged Out    Hepatitis B vaccine  Aged Out    Hib vaccine  Aged Out    Meningococcal (ACWY) vaccine  Aged Out    HIV screen  Discontinued     Recommendations for Grain Management Due: see orders and patient instructions/AVS.  . Recommended screening schedule for the next 5-10 years is provided to the patient in written form: see Patient Instructions/AVS.    Diagnoses and all orders for this visit:    Routine general medical examination at a health care facility               Renetta Foster is a 73 y.o. female being evaluated by a Virtual Visit (phone) encounter to address concerns as mentioned above. A caregiver was present when appropriate.  Due to this being a TeleHealth encounter (During UVSLV-21 public health emergency), evaluation of the following organ systems was limited: Vitals/Constitutional/EENT/Resp/CV/GI//MS/Neuro/Skin/Heme-Lymph-Imm. Pursuant to the emergency declaration under the [address] [place] [address] and the [employer] Act, this Virtual Visit was conducted with patient's (and/or legal guardian's) consent, to reduce the patient's risk of exposure to COVID-19 and provide necessary medical care. The patient (and/or legal guardian) has also been advised to contact this office for worsening conditions or problems, and seek emergency medical treatment and/or call 911 if deemed necessary. Patient identification was verified at the start of the visit: Yes    Services were provided through phone to substitute for in-person clinic visit. Patient and provider were located at their individual homes. --Chandana Bullard MD on 5/6/2021 at 2:29 PM    An electronic signature was used to authenticate this note.

## 2021-05-11 RX ORDER — ERYTHROMYCIN 5 MG/G
OINTMENT OPHTHALMIC
COMMUNITY
Start: 2021-04-29

## 2021-05-12 ENCOUNTER — OFFICE VISIT (OUTPATIENT)
Dept: FAMILY MEDICINE CLINIC | Age: 66
End: 2021-05-12
Payer: MEDICARE

## 2021-05-12 VITALS
BODY MASS INDEX: 34.37 KG/M2 | DIASTOLIC BLOOD PRESSURE: 70 MMHG | RESPIRATION RATE: 16 BRPM | WEIGHT: 219 LBS | TEMPERATURE: 98.8 F | SYSTOLIC BLOOD PRESSURE: 130 MMHG | HEIGHT: 67 IN | OXYGEN SATURATION: 96 % | HEART RATE: 92 BPM

## 2021-05-12 DIAGNOSIS — R06.00 DYSPNEA, UNSPECIFIED TYPE: Primary | ICD-10-CM

## 2021-05-12 PROCEDURE — 99213 OFFICE O/P EST LOW 20 MIN: CPT | Performed by: INTERNAL MEDICINE

## 2021-05-12 PROCEDURE — G8417 CALC BMI ABV UP PARAM F/U: HCPCS | Performed by: INTERNAL MEDICINE

## 2021-05-12 PROCEDURE — 3017F COLORECTAL CA SCREEN DOC REV: CPT | Performed by: INTERNAL MEDICINE

## 2021-05-12 PROCEDURE — 4040F PNEUMOC VAC/ADMIN/RCVD: CPT | Performed by: INTERNAL MEDICINE

## 2021-05-12 PROCEDURE — G8399 PT W/DXA RESULTS DOCUMENT: HCPCS | Performed by: INTERNAL MEDICINE

## 2021-05-12 PROCEDURE — 1123F ACP DISCUSS/DSCN MKR DOCD: CPT | Performed by: INTERNAL MEDICINE

## 2021-05-12 PROCEDURE — G8427 DOCREV CUR MEDS BY ELIG CLIN: HCPCS | Performed by: INTERNAL MEDICINE

## 2021-05-12 PROCEDURE — 1036F TOBACCO NON-USER: CPT | Performed by: INTERNAL MEDICINE

## 2021-05-12 PROCEDURE — 1090F PRES/ABSN URINE INCON ASSESS: CPT | Performed by: INTERNAL MEDICINE

## 2021-05-12 ASSESSMENT — ENCOUNTER SYMPTOMS
BLOOD IN STOOL: 0
RHINORRHEA: 0
TROUBLE SWALLOWING: 0
ABDOMINAL DISTENTION: 0
PHOTOPHOBIA: 0
EYE PAIN: 0
SORE THROAT: 0
CHEST TIGHTNESS: 0
NAUSEA: 0
FACIAL SWELLING: 0
APNEA: 0
ABDOMINAL PAIN: 0
RECTAL PAIN: 0
COLOR CHANGE: 0
EYE ITCHING: 0
EYE REDNESS: 0
DIARRHEA: 0
EYE DISCHARGE: 0
COUGH: 0
SHORTNESS OF BREATH: 0
SINUS PRESSURE: 0
WHEEZING: 0
SINUS PAIN: 0
VOMITING: 0
CONSTIPATION: 0
BACK PAIN: 0
VOICE CHANGE: 0

## 2021-05-12 NOTE — PROGRESS NOTES
stiffness. Skin: Negative for color change, rash and wound. Allergic/Immunologic: Negative for environmental allergies and food allergies. Neurological: Negative for dizziness, tremors, seizures, syncope, facial asymmetry, speech difficulty, weakness, light-headedness, numbness and headaches. Hematological: Negative for adenopathy. Does not bruise/bleed easily. Psychiatric/Behavioral: Negative for agitation, confusion, decreased concentration, hallucinations, self-injury, sleep disturbance and suicidal ideas. The patient is not nervous/anxious. Prior to Visit Medications    Medication Sig Taking? Authorizing Provider   erythromycin (ROMYCIN) 5 MG/GM ophthalmic ointment  Yes Historical Provider, MD   levothyroxine (SYNTHROID) 150 MCG tablet Take 1 tablet by mouth Daily Yes Parvin Infante MD   amLODIPine (NORVASC) 5 MG tablet Take 1 tablet by mouth daily Yes Erasmo Nava MD   pravastatin (PRAVACHOL) 40 MG tablet TAKE 1 TABLET BY MOUTH ONE TIME A DAY Yes Parvin Infante MD   DAR 0.05 MG/24HR Place 1 patch onto the skin Twice a Week Yes Augusto Ivey, DO        Allergies   Allergen Reactions    Antiseptic Products, Misc.  Rash     duraprep       Past Medical History:   Diagnosis Date    Arthritis     both knees    Hyperlipidemia     meds > 10 yrs    Hypertension     meds > 4 yrs    Hyperthyroidism     Hypothyroidism     meds > 20 yrs    Thyroid goiter        Past Surgical History:   Procedure Laterality Date    APPENDECTOMY  1981    with left oophorectomy    BREAST BIOPSY Left 2012    x 2 / both benign    CHOLECYSTECTOMY  2008    COLONOSCOPY  11-16-07    COLONOSCOPY  [id number]    Luis Fernando Carreon (polyps)    COLONOSCOPY  2/12/16    w/polypectomy     COLONOSCOPY N/A 2/18/2019    COLORECTAL CANCER SCREENING, HIGH RISK performed by Melani Grace MD at [address] Left 1/8/2020    FLEXIBLE CYSTOSCOPY LEFT DOUBLE J STENT REMOVAL (RECENT SURGERY 12/23/19) ANAIS ON ARRIVAL performed by La Swan MD at [address] / Kim Andrews Left 2019    CYSTOSCOPY LEFT RETROGRADE PYELOGRAM  LEFT DOUBLE J STENT [facility]. [address], COLON, DIAGNOSTIC     [address]    JOINT REPLACEMENT  2019    LTKR    KNEE ARTHROSCOPY Bilateral 1987 2003    left X2 and Right X1    LITHOTRIPSY Left 2019    LEFT ESWL performed by Lisa Spicer MD at [facility] Left 2019    LEFT TOTAL KNEE REPLACEMENT- [address] performed by Pennie Sosa MD at [facility] History     Socioeconomic History    Marital status:      Spouse name: Not on file    Number of children: Not on file    Years of education: Not on file    Highest education level: Not on file   Occupational History    Not on file   Social Needs    Financial resource strain: Not on file    Food insecurity     Worry: Not on file     Inability: Not on file    Transportation needs     Medical: Not on file     Non-medical: Not on file   Tobacco Use    Smoking status: Former Smoker     Packs/day: 0.25     Years: 5.00     Pack years: 1.25     Types: Cigarettes     Quit date:      Years since quittin.3    Smokeless tobacco: Never Used   Substance and Sexual Activity    Alcohol use: Yes     Comment: occ.     Drug use: No     Comment: CBD oil to left knee    Sexual activity: Not on file   Lifestyle    Physical activity     Days per week: Not on file     Minutes per session: Not on file    Stress: Not on file   Relationships    Social connections     Talks on phone: Not on file     Gets together: Not on file     Attends Hinduism service: Not on file     Active member of club or organization: Not on file     Attends meetings of clubs or organizations: Not on file     Relationship status: Not on file    Intimate partner violence     Fear of current or ex partner: Not on file Emotionally abused: Not on file     Physically abused: Not on file     Forced sexual activity: Not on file   Other Topics Concern    Not on file   Social History Narrative    Not on file        Family History   Problem Relation Age of Onset    Heart Disease Mother     Cancer Mother         RENAL CELL    COPD Mother     Diabetes Other     Breast Cancer Other     Colon Cancer Father     Macular Degen Father     Cancer Brother         prostate cancer    No Known Problems Son        Vitals:    05/12/21 0832   BP: 130/70   Pulse: 92   Resp: 16   Temp: 98.8 °F (37.1 °C)   SpO2: 96%   Weight: 219 lb (99.3 kg)   Height: 5' 7\" (1.702 m)     Estimated body mass index is 34.3 kg/m² as calculated from the following:    Height as of this encounter: 5' 7\" (1.702 m). Weight as of this encounter: 219 lb (99.3 kg). Physical Exam  Constitutional:       General: She is not in acute distress. Appearance: She is well-developed. HENT:      Head: Normocephalic. Right Ear: External ear normal.      Left Ear: External ear normal.   Eyes:      Conjunctiva/sclera: Conjunctivae normal.   Neck:      Musculoskeletal: Neck supple. Vascular: No JVD. Trachea: No tracheal deviation. Cardiovascular:      Rate and Rhythm: Normal rate and regular rhythm. Heart sounds: Normal heart sounds. Pulmonary:      Effort: Pulmonary effort is normal. No respiratory distress. Breath sounds: Normal breath sounds. No wheezing or rales. Chest:      Chest wall: No tenderness. Abdominal:      General: Bowel sounds are normal. There is no distension. Palpations: Abdomen is soft. There is no mass. Tenderness: There is no abdominal tenderness. There is no guarding or rebound. Musculoskeletal:         General: No tenderness or deformity. Lymphadenopathy:      Cervical: No cervical adenopathy. Skin:     General: Skin is warm and dry. Coloration: Skin is not pale. Findings: No erythema or rash. Neurological:      Mental Status: She is alert and oriented to person, place, and time. Motor: No abnormal muscle tone. Psychiatric:         Thought Content: Thought content normal.         Judgment: Judgment normal.         ASSESSMENT/PLAN:  Dyspnea: referral to cardiology and order an echocardiogram.        Essential hypertension  Continue Norvasc 5 mg po dailydue to headaches. Hypothyroidism, unspecified type  Continue Synthroid 150 mcg daily      Hyperlipidemia, unspecified hyperlipidemia typelipid panel has been ordered by the patient's endocrinologist.  Will await the results of the study. Hyperglycemia  -Encouraged aerobic exercise daily. ds.      Class 1 obesity due to excess calories with serious comorbidity and body mass index (BMI)35.2 as stated per #1. Postmenopausal symptomscontinue estradiol. Return in about 4 months (around 9/12/2021). An  electronic signature was used to authenticate this note.     --Kaden Ham MD on 5/12/2021 at 9:07 AM

## 2021-06-04 ENCOUNTER — HOSPITAL ENCOUNTER (OUTPATIENT)
Dept: NON INVASIVE DIAGNOSTICS | Age: 66
Discharge: HOME | End: 2021-06-04
Payer: MEDICARE

## 2021-06-04 DIAGNOSIS — R06.00 DYSPNEA, UNSPECIFIED TYPE: ICD-10-CM

## 2021-06-04 LAB
LV EF: 55 %
LVEF MODALITY: NORMAL

## 2021-06-04 PROCEDURE — 93306 TTE W/DOPPLER COMPLETE: CPT

## 2021-06-09 DIAGNOSIS — E03.9 HYPOTHYROIDISM, UNSPECIFIED TYPE: ICD-10-CM

## 2021-06-09 LAB
T4 FREE: 1.3 NG/DL (ref 0.84–1.68)
TSH SERPL DL<=0.05 MIU/L-ACNC: 3.58 UIU/ML (ref 0.44–3.86)

## 2021-06-11 ENCOUNTER — OFFICE VISIT (OUTPATIENT)
Dept: ENDOCRINOLOGY | Age: 66
End: 2021-06-11
Payer: MEDICARE

## 2021-06-11 VITALS
BODY MASS INDEX: 34.21 KG/M2 | DIASTOLIC BLOOD PRESSURE: 78 MMHG | OXYGEN SATURATION: 94 % | WEIGHT: 218 LBS | SYSTOLIC BLOOD PRESSURE: 116 MMHG | HEART RATE: 79 BPM | HEIGHT: 67 IN

## 2021-06-11 DIAGNOSIS — E04.9 GOITER: ICD-10-CM

## 2021-06-11 DIAGNOSIS — E03.9 HYPOTHYROIDISM, UNSPECIFIED TYPE: Primary | ICD-10-CM

## 2021-06-11 PROCEDURE — 1123F ACP DISCUSS/DSCN MKR DOCD: CPT | Performed by: INTERNAL MEDICINE

## 2021-06-11 PROCEDURE — 1036F TOBACCO NON-USER: CPT | Performed by: INTERNAL MEDICINE

## 2021-06-11 PROCEDURE — G8427 DOCREV CUR MEDS BY ELIG CLIN: HCPCS | Performed by: INTERNAL MEDICINE

## 2021-06-11 PROCEDURE — G8399 PT W/DXA RESULTS DOCUMENT: HCPCS | Performed by: INTERNAL MEDICINE

## 2021-06-11 PROCEDURE — 3017F COLORECTAL CA SCREEN DOC REV: CPT | Performed by: INTERNAL MEDICINE

## 2021-06-11 PROCEDURE — 99213 OFFICE O/P EST LOW 20 MIN: CPT | Performed by: INTERNAL MEDICINE

## 2021-06-11 PROCEDURE — G8417 CALC BMI ABV UP PARAM F/U: HCPCS | Performed by: INTERNAL MEDICINE

## 2021-06-11 PROCEDURE — 4040F PNEUMOC VAC/ADMIN/RCVD: CPT | Performed by: INTERNAL MEDICINE

## 2021-06-11 PROCEDURE — 1090F PRES/ABSN URINE INCON ASSESS: CPT | Performed by: INTERNAL MEDICINE

## 2021-06-11 ASSESSMENT — ENCOUNTER SYMPTOMS: SHORTNESS OF BREATH: 1

## 2021-06-11 NOTE — PROGRESS NOTES
6/11/2021    Assessment:       Diagnosis Orders   1. Hypothyroidism, unspecified type  TSH with Reflex    T4, Free    Basic Metabolic Panel    Thyroid Peroxidase Antibody   2. Goiter  US HEAD NECK SOFT TISSUE THYROID         PLAN:     Orders Placed This Encounter   Procedures    US HEAD NECK SOFT TISSUE THYROID     Standing Status:   Future     Standing Expiration Date:   6/11/2022    TSH with Reflex     Standing Status:   Future     Standing Expiration Date:   6/11/2022    T4, Free     Standing Status:   Future     Standing Expiration Date:   6/11/2022    Basic Metabolic Panel     Standing Status:   Future     Standing Expiration Date:   6/11/2022    Thyroid Peroxidase Antibody     Standing Status:   Future     Standing Expiration Date:   6/11/2022     Continue Synthroid 150 mcg daily patient to follow-up in 3 to 6 months time      Subjective:     Chief Complaint   Patient presents with    Hypothyroidism     Vitals:    06/11/21 1031   BP: 116/78   Pulse: 79   SpO2: 94%   Weight: 218 lb (98.9 kg)   Height: 5' 7\" (1.702 m)     Wt Readings from Last 3 Encounters:   06/11/21 218 lb (98.9 kg)   05/12/21 219 lb (99.3 kg)   03/05/21 225 lb (102.1 kg)     BP Readings from Last 3 Encounters:   06/11/21 116/78   05/12/21 130/70   03/05/21 130/80     Follow-up on hypothyroidism patient on levothyroxine 150 mcg daily thyroid function does have improved seen cardiologist for shortness of breath does also have some edema of lower legs  History of goiter had thyroid ultrasound done 3 to 4 years ago which was reviewed  Dose recently increased in March for levothyroxine    Other  This is a chronic (Hypothyroidism) problem. The current episode started more than 1 year ago. The problem has been gradually improving. Pertinent negatives include no neck pain. Nothing aggravates the symptoms. Treatments tried: Levothyroxine. The treatment provided moderate relief.           EXAMINATION: ULTRASOUND THYROID       CLINICAL HISTORY: Suspected thyroid goiter. Hypothyroidism.       COMPARISONS: None available.       FINDINGS: Biplanar images were obtained. The right lobe measures 3.9 x 1.1 x 1.4 cm with a volume of 3.1 mL.  The left lobe measures 3.8 x 1 x 1.3 cm with a volume of 2.6 mL.   The isthmus measures 0.2 cm.   The thyroid gland is normal in size. Thyroid    gland has a heterogeneous echotexture.       Right Lobe:  Unremarkable.       Left Lobe:  At the superior aspect there is a hyperechoic nodule which measures 5 x 6 x 4 mm.           Impression       NORMAL SIZE THYROID GLAND. SMALL HYPERECHOIC NODULE LEFT LOBE.       NO EVIDENCE OF DOMINANT NODULE.       _________       SOCIETY OF RADIOLOGISTS CONSENSUS STATEMENT ON THYROID NODULES:       Biopsy should be performed on a nodule:       1 cm in diameter or larger with microcalcifications.       1.5 cm in diameter or larger if it is solid or there are coarse calcifications.       2 cm in diameter or larger with  mixed solid and cystic components.       Nodule that has undergone substantial growth or is associated with abnormal cervical lymph nodes.       Karena Sever MC, Willa Stafford, Agustin JW, et al. MANAGEMENT OF THYROID NODULES DETECTED AT ULTRASOUND: Society of Radiologists in Ultrasound Consensus Conference Statement.  Radiology 2005; [id number]         Past Medical History:   Diagnosis Date    Arthritis     both knees    Hyperlipidemia     meds > 10 yrs    Hypertension     meds > 4 yrs    Hyperthyroidism     Hypothyroidism     meds > 20 yrs    Thyroid goiter      Past Surgical History:   Procedure Laterality Date    APPENDECTOMY  1981    with left oophorectomy    BREAST BIOPSY Left 2012    x 2 / both benign    CHOLECYSTECTOMY  2008    COLONOSCOPY  11-16-07    COLONOSCOPY  230211    Gale Carreon (polyps)    COLONOSCOPY  2/12/16    w/polypectomy     COLONOSCOPY N/A 2/18/2019    COLORECTAL CANCER SCREENING, HIGH RISK performed by Darrel Judge MD at [facility] Connections:     Frequency of Communication with Friends and Family:     Frequency of Social Gatherings with Friends and Family:     Attends Mandaeism Services:     Active Member of Clubs or Organizations:     Attends Club or Organization Meetings:     Marital Status:    Intimate Partner Violence:     Fear of Current or Ex-Partner:     Emotionally Abused:     Physically Abused:     Sexually Abused:      Family History   Problem Relation Age of Onset    Heart Disease Mother     Cancer Mother         RENAL CELL    COPD Mother     Diabetes Other     Breast Cancer Other     Colon Cancer Father     Macular Degen Father     Cancer Brother         prostate cancer    No Known Problems Son      Allergies   Allergen Reactions    Antiseptic Products, Misc.  Rash     duraprep       Current Outpatient Medications:     erythromycin (ROMYCIN) 5 MG/GM ophthalmic ointment, , Disp: , Rfl:     levothyroxine (SYNTHROID) 150 MCG tablet, Take 1 tablet by mouth Daily, Disp: 90 tablet, Rfl: 1    amLODIPine (NORVASC) 5 MG tablet, Take 1 tablet by mouth daily, Disp: 30 tablet, Rfl: 3    pravastatin (PRAVACHOL) 40 MG tablet, TAKE 1 TABLET BY MOUTH ONE TIME A DAY, Disp: 90 tablet, Rfl: 1    DAR 0.05 MG/24HR, Place 1 patch onto the skin Twice a Week, Disp: 24 patch, Rfl: 6  Lab Results   Component Value Date     03/05/2021    K 4.6 03/05/2021     03/05/2021    CO2 23 03/05/2021    BUN 16 03/05/2021    CREATININE 1.05 (H) 03/05/2021    GLUCOSE 111 (H) 03/05/2021    CALCIUM 9.3 03/05/2021    PROT 6.5 01/19/2019    LABALBU 4.1 01/19/2019    BILITOT <0.2 01/19/2019    ALKPHOS 54 01/19/2019    AST 20 01/19/2019    ALT 33 01/19/2019    LABGLOM 52.6 (L) 03/05/2021    GFRAA >60.0 03/05/2021    GLOB 2.5 05/01/2015     Lab Results   Component Value Date    WBC 7.7 12/18/2019    HGB 14.1 12/18/2019    HCT 41.7 12/18/2019    MCV 91.4 12/18/2019     12/18/2019     Lab Results   Component Value Date    LABA1C 5.9 03/05/2021    LABA1C 5.8 02/26/2020     Lab Results   Component Value Date    HDL 31 (L) 03/05/2021    HDL 35 (L) 02/26/2020    HDL 32 (L) 12/31/2019    LDLCALC 109 03/05/2021    LDLCALC 104 02/26/2020    LDLCALC 86 12/31/2019    CHOL 184 03/05/2021    CHOL 164 02/26/2020    CHOL 172 12/31/2019    TRIG 221 (H) 03/05/2021    TRIG 126 02/26/2020    TRIG 270 (H) 12/31/2019       Lab Results   Component Value Date    TSH 3.580 06/09/2021    TSH 5.480 (H) 12/03/2020    TSH 4.560 (H) 02/26/2020    TSHREFLEX 9.930 (H) 03/05/2021    TSHREFLEX 14.920 (H) 07/17/2020    T4FREE 1.30 06/09/2021    T4FREE 1.33 03/05/2021    T4FREE 1.21 12/03/2020       Review of Systems   HENT: Negative for trouble swallowing. Respiratory: Positive for shortness of breath. Cardiovascular: Positive for leg swelling. Musculoskeletal: Negative for neck pain. All other systems reviewed and are negative. Objective:   Physical Exam  Vitals reviewed. Constitutional:       Appearance: Normal appearance. She is obese. HENT:      Head: Normocephalic and atraumatic. Hair is normal.      Right Ear: External ear normal.      Left Ear: External ear normal.      Nose: Nose normal.   Eyes:      General: No scleral icterus. Right eye: No discharge. Left eye: No discharge. Extraocular Movements: Extraocular movements intact. Conjunctiva/sclera: Conjunctivae normal.   Neck:      Thyroid: Thyromegaly present. Trachea: Trachea normal.     Cardiovascular:      Rate and Rhythm: Normal rate. Pulmonary:      Effort: Pulmonary effort is normal.   Musculoskeletal:         General: Normal range of motion. Cervical back: Normal range of motion and neck supple. Right lower leg: Edema present. Left lower leg: Edema present. Neurological:      General: No focal deficit present. Mental Status: She is alert and oriented to person, place, and time.    Psychiatric:         Mood and Affect: Mood normal. Behavior: Behavior normal.

## 2021-06-16 ASSESSMENT — ENCOUNTER SYMPTOMS: TROUBLE SWALLOWING: 0

## 2021-06-21 RX ORDER — PRAVASTATIN SODIUM 40 MG
TABLET ORAL
Qty: 90 TABLET | Refills: 1 | Status: SHIPPED | OUTPATIENT
Start: 2021-06-21 | End: 2021-12-03

## 2021-06-21 RX ORDER — AMLODIPINE BESYLATE 5 MG/1
TABLET ORAL
Qty: 90 TABLET | Refills: 1 | Status: SHIPPED | OUTPATIENT
Start: 2021-06-21 | End: 2021-12-03

## 2021-06-21 NOTE — TELEPHONE ENCOUNTER
Requesting medication refill.  Please approve or deny this request.    Rx requested:  Requested Prescriptions     Pending Prescriptions Disp Refills    amLODIPine (NORVASC) 5 MG tablet [Pharmacy Med Name: AMLODIPINE BESYLATE 5 MG TAB] 90 tablet 1     Sig: TAKE 1 TABLET BY MOUTH EVERY DAY       Last Office Visit:   5/12/2021    Last Filled:      Last Labs:      Next Visit Date:  Future Appointments   Date Time Provider [facility]   7/29/2021 11:45 AM Yoselin Vanessa MD [facility]   8/2/2021  7:30 AM [place] ULTRASOUND 1 [facility] RAD   9/10/2021 10:30 AM Fred Everett  [address]   9/13/2021  8:30 AM Colette Fleming MD [address] [place] [address] 7   1/13/2022  8:30 AM Kun Gallardo [facility]

## 2021-07-07 ENCOUNTER — OFFICE VISIT (OUTPATIENT)
Dept: FAMILY MEDICINE CLINIC | Age: 66
End: 2021-07-07
Payer: MEDICARE

## 2021-07-07 VITALS
SYSTOLIC BLOOD PRESSURE: 128 MMHG | DIASTOLIC BLOOD PRESSURE: 76 MMHG | OXYGEN SATURATION: 99 % | HEIGHT: 67 IN | WEIGHT: 218 LBS | TEMPERATURE: 97.6 F | BODY MASS INDEX: 34.21 KG/M2 | HEART RATE: 98 BPM

## 2021-07-07 DIAGNOSIS — Z01.818 PRE-OP EXAMINATION: Primary | ICD-10-CM

## 2021-07-07 DIAGNOSIS — Z01.818 PRE-OP EXAMINATION: ICD-10-CM

## 2021-07-07 LAB
ANION GAP SERPL CALCULATED.3IONS-SCNC: 14 MEQ/L (ref 9–15)
BUN BLDV-MCNC: 13 MG/DL (ref 8–23)
CALCIUM SERPL-MCNC: 9.8 MG/DL (ref 8.5–9.9)
CHLORIDE BLD-SCNC: 105 MEQ/L (ref 95–107)
CO2: 22 MEQ/L (ref 20–31)
CREAT SERPL-MCNC: 0.83 MG/DL (ref 0.5–0.9)
GFR AFRICAN AMERICAN: >60
GFR NON-AFRICAN AMERICAN: >60
GLUCOSE BLD-MCNC: 103 MG/DL (ref 70–99)
HCT VFR BLD CALC: 43.8 % (ref 37–47)
HEMOGLOBIN: 14.6 G/DL (ref 12–16)
MCH RBC QN AUTO: 30.4 PG (ref 27–31.3)
MCHC RBC AUTO-ENTMCNC: 33.5 % (ref 33–37)
MCV RBC AUTO: 90.9 FL (ref 82–100)
PDW BLD-RTO: 13.5 % (ref 11.5–14.5)
PLATELET # BLD: 295 K/UL (ref 130–400)
POTASSIUM SERPL-SCNC: 4.2 MEQ/L (ref 3.4–4.9)
RBC # BLD: 4.81 M/UL (ref 4.2–5.4)
SODIUM BLD-SCNC: 141 MEQ/L (ref 135–144)
WBC # BLD: 8 K/UL (ref 4.8–10.8)

## 2021-07-07 PROCEDURE — 93000 ELECTROCARDIOGRAM COMPLETE: CPT | Performed by: NURSE PRACTITIONER

## 2021-07-07 PROCEDURE — 99212 OFFICE O/P EST SF 10 MIN: CPT | Performed by: NURSE PRACTITIONER

## 2021-07-07 SDOH — ECONOMIC STABILITY: TRANSPORTATION INSECURITY
IN THE PAST 12 MONTHS, HAS THE LACK OF TRANSPORTATION KEPT YOU FROM MEDICAL APPOINTMENTS OR FROM GETTING MEDICATIONS?: NO

## 2021-07-07 SDOH — ECONOMIC STABILITY: TRANSPORTATION INSECURITY
IN THE PAST 12 MONTHS, HAS LACK OF TRANSPORTATION KEPT YOU FROM MEETINGS, WORK, OR FROM GETTING THINGS NEEDED FOR DAILY LIVING?: NO

## 2021-07-07 SDOH — ECONOMIC STABILITY: FOOD INSECURITY: WITHIN THE PAST 12 MONTHS, YOU WORRIED THAT YOUR FOOD WOULD RUN OUT BEFORE YOU GOT MONEY TO BUY MORE.: NEVER TRUE

## 2021-07-07 SDOH — ECONOMIC STABILITY: FOOD INSECURITY: WITHIN THE PAST 12 MONTHS, THE FOOD YOU BOUGHT JUST DIDN'T LAST AND YOU DIDN'T HAVE MONEY TO GET MORE.: NEVER TRUE

## 2021-07-07 ASSESSMENT — SOCIAL DETERMINANTS OF HEALTH (SDOH): HOW HARD IS IT FOR YOU TO PAY FOR THE VERY BASICS LIKE FOOD, HOUSING, MEDICAL CARE, AND HEATING?: NOT HARD AT ALL

## 2021-07-07 NOTE — PATIENT INSTRUCTIONS
Patient Education        Learning About How to Prepare for Surgery  How can you prepare before surgery? You can do some things that will help you safely prepare for surgery. · Understand exactly what surgery is planned. You should know the risks, benefits, and other options. · Tell your doctors ALL the medicines, vitamins, supplements, and herbal remedies you take. Some of these can increase the risk of bleeding. Or they may interact with anesthesia. · Follow your doctor's instructions about which medicines to take or stop before your surgery. ? You may need to stop taking some medicines a week or more before surgery. ? If you take aspirin or some other blood thinner, be sure to talk to your doctor. · Follow any other instructions your doctor gave you. · If you have an advance directive, let your doctor know, and bring a copy to the hospital.   It may include a living will and a durable power of  for health care. It lets your doctor and loved ones know your health care wishes. If you don't have one, you may want to prepare one. How can you prepare on the day of surgery? Here are some tips about what to do at home before you leave for your surgery. · If your doctor told you to take your medicines on the day of surgery, take them with only a sip of water. · Follow the instructions about when to stop eating and drinking. If you don't, your surgery may be canceled. · Follow your doctor's instructions about when to bathe or shower before your surgery. · Do not shave the surgical site yourself. · Take off all jewelry and piercings. · Take out contact lenses, if you wear them. · Have a picture ID ready to take with you. Your ID will be checked before your surgery. · Know when to call your doctor. Call your doctor if you:  ? Become ill before surgery. ? Need to reschedule. ? Have changed your mind about having the surgery. What happens before surgery?   Here are some things you can expect to happen before your surgery. · Your picture ID will be checked. · The area of your body that needs surgery is often marked to make sure there are no errors. · You will be kept comfortable and safe by your anesthesia provider. The anesthesia may make you sleep. Or it may just numb the area being worked on. What happens when you are ready to go home? Be sure you have someone drive you home. Anesthesia and pain medicine make it unsafe for you to drive. You will get instructions about recovering from your surgery. This is called a discharge plan. It will cover things like diet, wound care, follow-up care, driving, and getting back to your normal routine. Follow-up care is a key part of your treatment and safety. Be sure to make and go to all appointments, and call your doctor if you are having problems. It's also a good idea to know your test results and keep a list of the medicines you take. Where can you learn more? Go to [email or web address]. org and sign in to your [employer] account. Enter Q270 in the YouWeb box to learn more about \"Learning About How to Prepare for Surgery. \"     If you do not have an account, please click on the \"Sign Up Now\" link. Current as of: May 27, 2020               Content Version: 12.9  © 2006-2021 Healthwise, Incorporated. Care instructions adapted under license by [facility] ([facility]). If you have questions about a medical condition or this instruction, always ask your healthcare professional. [place] [address] any warranty or liability for your use of this information.

## 2021-07-07 NOTE — PROGRESS NOTES
Preoperative Consultation      Lei Alvarado  YOB: 1955    Date of Service:  7/7/2021    Vitals:    07/07/21 1019   BP: 128/76   Site: Right Upper Arm   Position: Sitting   Cuff Size: Large Adult   Pulse: 98   Temp: 97.6 °F (36.4 °C)   TempSrc: Temporal   SpO2: 99%   Weight: 218 lb (98.9 kg)   Height: 5' 7\" (1.702 m)      Wt Readings from Last 2 Encounters:   07/07/21 218 lb (98.9 kg)   06/11/21 218 lb (98.9 kg)     BP Readings from Last 3 Encounters:   07/07/21 128/76   06/11/21 116/78   05/12/21 130/70        Chief Complaint   Patient presents with   [employer] Pre-op Exam     Patient here for pre-op for(DR. DAMON) CARPAL TUNNEL RELEASE/REPAIR RIGHT DATE OF SURGERY 7/13/21     Allergies   Allergen Reactions    Antiseptic Products, Misc. Rash     duraprep     Outpatient Medications Marked as Taking for the 7/7/21 encounter (Office Visit) with GIAN Hobbs CNP   Medication Sig Dispense Refill    pravastatin (PRAVACHOL) 40 MG tablet TAKE 1 TABLET BY MOUTH EVERY DAY 90 tablet 1    amLODIPine (NORVASC) 5 MG tablet TAKE 1 TABLET BY MOUTH EVERY DAY 90 tablet 1    erythromycin (ROMYCIN) 5 MG/GM ophthalmic ointment       levothyroxine (SYNTHROID) 150 MCG tablet Take 1 tablet by mouth Daily 90 tablet 1    DAR 0.05 MG/24HR Place 1 patch onto the skin Twice a Week 24 patch 6       This patient presents to the office today for a preoperative consultation at the request of surgeon, Dr. Radha Brunner, who plans on performing carpal tunnel release of the right upper limb on July 13 at . The current problem began 1 year ago, and symptoms have been worsening with time. Conservative therapy: Yes: medications, bracing, which has been ineffective. .    Planned anesthesia: Local   Known anesthesia problems: None   Bleeding risk: No recent or remote history of abnormal bleeding  Personal or FH of DVT/PE: No    Patient objection to receiving blood products: No    Patient Active Problem List   Diagnosis    HTN (hypertension)    Hyperlipidemia    Hypothyroidism    Thyroid goiter    Obesity    Adenomatous polyp of ascending colon    Osteoarthritis of left knee    Left renal stone    Arthritis of knee    Calculus of kidney       Past Medical History:   Diagnosis Date    Arthritis     both knees    Hyperlipidemia     meds > 10 yrs    Hypertension     meds > 4 yrs    Hyperthyroidism     Hypothyroidism     meds > 20 yrs    Thyroid goiter      Past Surgical History:   Procedure Laterality Date    APPENDECTOMY  1981    with left oophorectomy    BREAST BIOPSY Left 2012    x 2 / both benign    CHOLECYSTECTOMY  2008    COLONOSCOPY  11-16-07    COLONOSCOPY  [id number]    Grace Carreon (polyps)    COLONOSCOPY  2/12/16    w/polypectomy     COLONOSCOPY N/A 2/18/2019    COLORECTAL CANCER SCREENING, HIGH RISK performed by Raymundo Lnogo MD at [address] Left 1/8/2020    FLEXIBLE CYSTOSCOPY LEFT DOUBLE J STENT REMOVAL (RECENT SURGERY 12/23/19) KUB ON ARRIVAL performed by Mitzi Bright MD at [facility] / STONE Left 12/23/2019    CYSTOSCOPY LEFT RETROGRADE PYELOGRAM  LEFT DOUBLE J STENT performed by Cheyanne Palma MD at [address] [place] [address] [address], DIAGNOSTIC     [address] REPLACEMENT  06/2019    LTKR    KNEE ARTHROSCOPY Bilateral 1987 2003 1994    left X2 and Right X1    LITHOTRIPSY Left 12/23/2019    LEFT ESWL performed by Cheyanne Palma MD at [address] [facility] Left 6/19/2019    LEFT TOTAL KNEE REPLACEMENT- [address] performed by Ulysees Kussmaul, MD at [address] History   Problem Relation Age of Onset    Heart Disease Mother     Cancer Mother         RENAL CELL    COPD Mother     Diabetes Other     Breast Cancer Other     Colon Cancer Father     Macular Degen Father     Cancer Brother         prostate cancer    No Known Problems Son      Social History     Socioeconomic History    Marital status:      Spouse name: Not on file    Number of children: Not on file    Years of education: Not on file    Highest education level: Not on file   Occupational History    Not on file   Tobacco Use    Smoking status: Former Smoker     Packs/day: 0.25     Years: 5.00     Pack years: 1.25     Types: Cigarettes     Quit date: 0     Years since quittin.5    Smokeless tobacco: Never Used   Vaping Use    Vaping Use: Never used   Substance and Sexual Activity    Alcohol use: Yes     Comment: occ.  Drug use: No     Comment: CBD oil to left knee    Sexual activity: Not on file   Other Topics Concern    Not on file   Social History Narrative    Not on file     Social Determinants of Health     Financial Resource Strain: Low Risk     Difficulty of Paying Living Expenses: Not hard at all   Food Insecurity: No Food Insecurity    Worried About Running Out of Food in the Last Year: Never true    Ruba of Food in the Last Year: Never true   Transportation Needs: No Transportation Needs    Lack of Transportation (Medical): No    Lack of Transportation (Non-Medical): No   Physical Activity:     Days of Exercise per Week:     Minutes of Exercise per Session:    Stress:     Feeling of Stress :    Social Connections:     Frequency of Communication with Friends and Family:     Frequency of Social Gatherings with Friends and Family:     Attends Worship Services:     Active Member of Clubs or Organizations:     Attends Club or Organization Meetings:     Marital Status:    Intimate Partner Violence:     Fear of Current or Ex-Partner:     Emotionally Abused:     Physically Abused:     Sexually Abused:        Review of Systems  A comprehensive review of systems was negative except for what was noted in the HPI. Physical Exam   Constitutional: She is oriented to person, place, and time. She appears well-developed and well-nourished. No distress.    HENT:   Head: Normocephalic and atraumatic. Mouth/Throat: Uvula is midline, oropharynx is clear and moist and mucous membranes are normal.   Eyes: Conjunctivae and EOM are normal. Pupils are equal, round, and reactive to light. Neck: Trachea normal and normal range of motion. Neck supple. No JVD present. Carotid bruit is not present. No mass and no thyromegaly present. Cardiovascular: Normal rate, regular rhythm, normal heart sounds and intact distal pulses. Exam reveals no gallop and no friction rub. No murmur heard. Pulmonary/Chest: Effort normal and breath sounds normal. No respiratory distress. She has no wheezes. She has no rales. Abdominal: Soft. Normal aorta and bowel sounds are normal. She exhibits no distension and no mass. There is no hepatosplenomegaly. No tenderness. Musculoskeletal: She exhibits no edema and no tenderness. Neurological: She is alert and oriented to person, place, and time. She has normal strength. No cranial nerve deficit or sensory deficit. Coordination and gait normal.   Skin: Skin is warm and dry. No rash noted. No erythema. Psychiatric: She has a normal mood and affect. Her behavior is normal.     EKG Interpretation:  normal EKG, normal sinus rhythm, unchanged from previous tracings. Lab Review not applicable  Labs obtained today        Assessment:       73 y.o. patient with planned surgery as above. Known risk factors for perioperative complications: None, Hypertension  Current medications which may produce withdrawal symptoms if withheld perioperatively: none      Plan:     1. Preoperative workup as follows: ECG, hemoglobin, hematocrit, electrolytes, creatinine, glucose  2. Change in medication regimen before surgery: Hold all medications on morning of surgery  3.  Prophylaxis for cardiac events with perioperative beta-blockers: Not indicated  ACC/AHA indications for pre-operative beta-blocker use:    · Vascular surgery with history of postitive stress test  · Intermediate or high risk surgery with history of CAD   · Intermediate or high risk surgery with multiple clinical predictors of CAD- 2 of the following: history of compensated or prior heart failure, history of cerebrovascular disease, DM, or renal insufficiency    Routine administration of higher-dose, long-acting metoprolol in beta-blockernaïve patients on the day of surgery, and in the absence of dose titration is associated with an overall increase in mortality. Beta-blockers should be started days to weeks prior to surgery and titrated to pulse < 70.  4. Deep vein thrombosis prophylaxis: regimen to be chosen by surgical team  5. No contraindications to planned surgery            Patient is here for [facility] for CTR on right hand. Surgery is on 7/13/21 at 0730. It is OP surgery. She anticipates being in a brace for 3-4 days. Says she has had surgery in past and she has no issues with anesthesia. She has allergy to skin prep. She is aware not to take any meds the day of surgery. Says she has been taking aleve pm but last dose was July 1, 2021. Says she does not have contact. She does not have dentures. Says has not a smoker. Says she has no CAD or breathing/lung history. Says she has not had any CP, SOB, or palpitations. Says she has not been ill and no fever,chills, or flu like sx. She did receive Covid 19 vaccine, Feb and March.

## 2021-07-09 RX ORDER — LEVOTHYROXINE SODIUM 0.15 MG/1
TABLET ORAL
Qty: 90 TABLET | Refills: 1 | Status: SHIPPED | OUTPATIENT
Start: 2021-07-09 | End: 2021-12-03

## 2021-07-13 ENCOUNTER — HOSPITAL ENCOUNTER (OUTPATIENT)
Age: 66
Setting detail: OUTPATIENT SURGERY
Discharge: HOME | End: 2021-07-13
Attending: ORTHOPAEDIC SURGERY | Admitting: ORTHOPAEDIC SURGERY
Payer: MEDICARE

## 2021-07-13 ENCOUNTER — ANESTHESIA EVENT (OUTPATIENT)
Dept: OPERATING ROOM | Age: 66
End: 2021-07-13
Payer: MEDICARE

## 2021-07-13 ENCOUNTER — ANESTHESIA (OUTPATIENT)
Dept: OPERATING ROOM | Age: 66
End: 2021-07-13
Payer: MEDICARE

## 2021-07-13 VITALS
TEMPERATURE: 97.2 F | DIASTOLIC BLOOD PRESSURE: 75 MMHG | SYSTOLIC BLOOD PRESSURE: 145 MMHG | HEIGHT: 67 IN | BODY MASS INDEX: 34.21 KG/M2 | HEART RATE: 66 BPM | RESPIRATION RATE: 14 BRPM | WEIGHT: 218 LBS | OXYGEN SATURATION: 95 %

## 2021-07-13 VITALS — SYSTOLIC BLOOD PRESSURE: 121 MMHG | DIASTOLIC BLOOD PRESSURE: 55 MMHG | OXYGEN SATURATION: 99 %

## 2021-07-13 DIAGNOSIS — M17.10 ARTHRITIS OF KNEE: Primary | ICD-10-CM

## 2021-07-13 PROCEDURE — 7100000010 HC PHASE II RECOVERY - FIRST 15 MIN: Performed by: ORTHOPAEDIC SURGERY

## 2021-07-13 PROCEDURE — 3700000000 HC ANESTHESIA ATTENDED CARE: Performed by: ORTHOPAEDIC SURGERY

## 2021-07-13 PROCEDURE — 2580000003 HC RX 258: Performed by: ORTHOPAEDIC SURGERY

## 2021-07-13 PROCEDURE — 6360000002 HC RX W HCPCS: Performed by: NURSE ANESTHETIST, CERTIFIED REGISTERED

## 2021-07-13 PROCEDURE — 2580000003 HC RX 258: Performed by: NURSE ANESTHETIST, CERTIFIED REGISTERED

## 2021-07-13 PROCEDURE — 3600000013 HC SURGERY LEVEL 3 ADDTL 15MIN: Performed by: ORTHOPAEDIC SURGERY

## 2021-07-13 PROCEDURE — 2709999900 HC NON-CHARGEABLE SUPPLY: Performed by: ORTHOPAEDIC SURGERY

## 2021-07-13 PROCEDURE — 64721 CARPAL TUNNEL SURGERY: CPT | Performed by: ORTHOPAEDIC SURGERY

## 2021-07-13 PROCEDURE — 7100000011 HC PHASE II RECOVERY - ADDTL 15 MIN: Performed by: ORTHOPAEDIC SURGERY

## 2021-07-13 PROCEDURE — 2500000003 HC RX 250 WO HCPCS: Performed by: ORTHOPAEDIC SURGERY

## 2021-07-13 PROCEDURE — 3700000001 HC ADD 15 MINUTES (ANESTHESIA): Performed by: ORTHOPAEDIC SURGERY

## 2021-07-13 PROCEDURE — 3600000003 HC SURGERY LEVEL 3 BASE: Performed by: ORTHOPAEDIC SURGERY

## 2021-07-13 RX ORDER — MIDAZOLAM HYDROCHLORIDE 2 MG/2ML
INJECTION, SOLUTION INTRAMUSCULAR; INTRAVENOUS PRN
Status: DISCONTINUED | OUTPATIENT
Start: 2021-07-13 | End: 2021-07-13 | Stop reason: SDUPTHER

## 2021-07-13 RX ORDER — SODIUM CHLORIDE, SODIUM LACTATE, POTASSIUM CHLORIDE, CALCIUM CHLORIDE 600; 310; 30; 20 MG/100ML; MG/100ML; MG/100ML; MG/100ML
INJECTION, SOLUTION INTRAVENOUS CONTINUOUS
Status: DISCONTINUED | OUTPATIENT
Start: 2021-07-13 | End: 2021-07-13 | Stop reason: HOSPADM

## 2021-07-13 RX ORDER — SODIUM CHLORIDE, SODIUM LACTATE, POTASSIUM CHLORIDE, CALCIUM CHLORIDE 600; 310; 30; 20 MG/100ML; MG/100ML; MG/100ML; MG/100ML
INJECTION, SOLUTION INTRAVENOUS CONTINUOUS PRN
Status: DISCONTINUED | OUTPATIENT
Start: 2021-07-13 | End: 2021-07-13

## 2021-07-13 RX ORDER — SODIUM CHLORIDE, SODIUM LACTATE, POTASSIUM CHLORIDE, CALCIUM CHLORIDE 600; 310; 30; 20 MG/100ML; MG/100ML; MG/100ML; MG/100ML
INJECTION, SOLUTION INTRAVENOUS CONTINUOUS PRN
Status: DISCONTINUED | OUTPATIENT
Start: 2021-07-13 | End: 2021-07-13 | Stop reason: SDUPTHER

## 2021-07-13 RX ORDER — CEFAZOLIN SODIUM 1 G/3ML
INJECTION, POWDER, FOR SOLUTION INTRAMUSCULAR; INTRAVENOUS PRN
Status: DISCONTINUED | OUTPATIENT
Start: 2021-07-13 | End: 2021-07-13 | Stop reason: SDUPTHER

## 2021-07-13 RX ORDER — BUPIVACAINE HYDROCHLORIDE 2.5 MG/ML
INJECTION, SOLUTION EPIDURAL; INFILTRATION; INTRACAUDAL PRN
Status: DISCONTINUED | OUTPATIENT
Start: 2021-07-13 | End: 2021-07-13 | Stop reason: ALTCHOICE

## 2021-07-13 RX ORDER — MAGNESIUM HYDROXIDE 1200 MG/15ML
LIQUID ORAL CONTINUOUS PRN
Status: COMPLETED | OUTPATIENT
Start: 2021-07-13 | End: 2021-07-13

## 2021-07-13 RX ORDER — ONDANSETRON 2 MG/ML
INJECTION INTRAMUSCULAR; INTRAVENOUS PRN
Status: DISCONTINUED | OUTPATIENT
Start: 2021-07-13 | End: 2021-07-13 | Stop reason: SDUPTHER

## 2021-07-13 RX ORDER — HYDROCODONE BITARTRATE AND ACETAMINOPHEN 5; 325 MG/1; MG/1
1 TABLET ORAL EVERY 6 HOURS PRN
Qty: 10 TABLET | Refills: 0 | Status: SHIPPED | OUTPATIENT
Start: 2021-07-13 | End: 2021-07-16

## 2021-07-13 RX ORDER — LIDOCAINE HYDROCHLORIDE 10 MG/ML
2 INJECTION, SOLUTION EPIDURAL; INFILTRATION; INTRACAUDAL; PERINEURAL ONCE
Status: COMPLETED | OUTPATIENT
Start: 2021-07-13 | End: 2021-07-13

## 2021-07-13 RX ORDER — PROPOFOL 10 MG/ML
INJECTION, EMULSION INTRAVENOUS CONTINUOUS PRN
Status: DISCONTINUED | OUTPATIENT
Start: 2021-07-13 | End: 2021-07-13 | Stop reason: SDUPTHER

## 2021-07-13 RX ORDER — FENTANYL CITRATE 50 UG/ML
INJECTION, SOLUTION INTRAMUSCULAR; INTRAVENOUS PRN
Status: DISCONTINUED | OUTPATIENT
Start: 2021-07-13 | End: 2021-07-13 | Stop reason: SDUPTHER

## 2021-07-13 RX ADMIN — PROPOFOL 100 MCG/KG/MIN: 10 INJECTION, EMULSION INTRAVENOUS at 08:30

## 2021-07-13 RX ADMIN — ONDANSETRON 4 MG: 2 INJECTION INTRAMUSCULAR; INTRAVENOUS at 08:49

## 2021-07-13 RX ADMIN — CEFAZOLIN 2000 MG: 1 INJECTION, POWDER, FOR SOLUTION INTRAMUSCULAR; INTRAVENOUS at 08:26

## 2021-07-13 RX ADMIN — SODIUM CHLORIDE, POTASSIUM CHLORIDE, SODIUM LACTATE AND CALCIUM CHLORIDE: 600; 310; 30; 20 INJECTION, SOLUTION INTRAVENOUS at 08:25

## 2021-07-13 RX ADMIN — FENTANYL CITRATE 50 MCG: 50 INJECTION, SOLUTION INTRAMUSCULAR; INTRAVENOUS at 08:35

## 2021-07-13 RX ADMIN — SODIUM CHLORIDE, POTASSIUM CHLORIDE, SODIUM LACTATE AND CALCIUM CHLORIDE 1000 ML: 600; 310; 30; 20 INJECTION, SOLUTION INTRAVENOUS at 07:26

## 2021-07-13 RX ADMIN — LIDOCAINE HYDROCHLORIDE 0.1 ML: 10 INJECTION, SOLUTION EPIDURAL; INFILTRATION; INTRACAUDAL; PERINEURAL at 07:26

## 2021-07-13 RX ADMIN — MIDAZOLAM HYDROCHLORIDE 2 MG: 1 INJECTION, SOLUTION INTRAMUSCULAR; INTRAVENOUS at 08:25

## 2021-07-13 ASSESSMENT — PULMONARY FUNCTION TESTS
PIF_VALUE: 0
PIF_VALUE: 0
PIF_VALUE: 1
PIF_VALUE: 1
PIF_VALUE: 0
PIF_VALUE: 1
PIF_VALUE: 0
PIF_VALUE: 1
PIF_VALUE: 0

## 2021-07-13 NOTE — ANESTHESIA POSTPROCEDURE EVALUATION
Department of Anesthesiology  Postprocedure Note    Patient: Singh Alexander  MRN: [id number]  YOB: 1955  Date of evaluation: 7/13/2021  Time:  9:05 AM     Procedure Summary     Date: 07/13/21 Room / Location: [address]    Anesthesia Start: 0825 Anesthesia Stop: 6544    Procedure: CARPAL TUNNEL RELEASE/REPAIR RIGHT (Right ) Diagnosis: (CARPAL TUNNEL SYNDROME, RIGHT UPPER LIMB)    Surgeons: Maday Thompson MD Responsible Provider: Rufina Dickson MD    Anesthesia Type: MAC, Sadaf block ASA Status: 2          Anesthesia Type: MAC, Hurricane block    Joseline Phase I: Joseline Score: 10    Joseline Phase II:      Last vitals: Reviewed and per EMR flowsheets. Anesthesia Post Evaluation    Patient location: phase 2.   Patient participation: complete - patient participated  Level of consciousness: awake  Pain score: 0  Airway patency: patent  Nausea & Vomiting: no nausea and no vomiting  Complications: no  Cardiovascular status: blood pressure returned to baseline and hemodynamically stable  Respiratory status: acceptable and nasal cannula  Hydration status: stable

## 2021-07-13 NOTE — PROGRESS NOTES
Pt given DC instructions and advised that she had 1 Rx at her pharmacy. Pt verbalized understanding of instructions and had no further questions.  Electronically signed by Tara Higgins RN on 7/13/2021 at 9:43 AM

## 2021-07-13 NOTE — ANESTHESIA PRE PROCEDURE
Department of Anesthesiology  Preprocedure Note       Name:  Tobi Macdonald   Age:  73 y.o.  :  1955                                          MRN:  [id number]         Date:  2021      Surgeon: Milind Fernandez): Karen Diaz MD    Procedure: Procedure(s):  CARPAL TUNNEL RELEASE/REPAIR RIGHT (PAT AT [place] [facility])    Medications prior to admission:   Prior to Admission medications    Medication Sig Start Date End Date Taking? Authorizing Provider   levothyroxine (SYNTHROID) 150 MCG tablet TAKE 1 TABLET BY MOUTH EVERY DAY 21  Yes Asael Dobbs MD   pravastatin (PRAVACHOL) 40 MG tablet TAKE 1 TABLET BY MOUTH EVERY DAY 21  Yes Asael Dobbs MD   amLODIPine (NORVASC) 5 MG tablet TAKE 1 TABLET BY MOUTH EVERY DAY 21  Yes Matt Waddell MD   erythromycin [facility]) 5 MG/GM ophthalmic ointment  21  Yes Historical Provider, MD   DAR 0.05 MG/24HR Place 1 patch onto the skin Twice a Week 20  Yes Erin Horowitz DO       Current medications:    Current Facility-Administered Medications   Medication Dose Route Frequency Provider Last Rate Last Admin    lactated ringers infusion   Intravenous Continuous Conjeevaradryan Olivares  mL/hr at 21 0726 1,000 mL at 21 0726    ceFAZolin (ANCEF) 2000 mg in dextrose 5 % 100 mL IVPB  2,000 mg Intravenous On Call to [id number] Raya Baker MD           Allergies: Allergies   Allergen Reactions    Antiseptic Products, Misc.  Rash     duraprep       Problem List:    Patient Active Problem List   Diagnosis Code    HTN (hypertension) I10    Hyperlipidemia E78.5    Hypothyroidism E03.9    Thyroid goiter E04.9    Obesity E66.9    Adenomatous polyp of ascending colon D12.2    Osteoarthritis of left knee M17.12    Left renal stone N20.0    Arthritis of knee M17.10    Calculus of kidney N20.0       Past Medical History:        Diagnosis Date    Arthritis     both knees    Hyperlipidemia     meds > 10 yrs    Hypertension     meds > 4 yrs    Hyperthyroidism     Hypothyroidism     meds > 20 yrs    Thyroid goiter        Past Surgical History:        Procedure Laterality Date    APPENDECTOMY      with left oophorectomy    BREAST BIOPSY Left 2012    x 2 / both benign    CHOLECYSTECTOMY      COLONOSCOPY  07    COLONOSCOPY  [id number]    Eric Carreon (polyps)    COLONOSCOPY  16    w/polypectomy     COLONOSCOPY N/A 2019    COLORECTAL CANCER SCREENING, HIGH RISK performed by Félix Jenkins MD at [address] Left 2020    FLEXIBLE CYSTOSCOPY LEFT DOUBLE J STENT REMOVAL (RECENT SURGERY 19) KUB ON ARRIVAL performed by Drea Rivera MD at [address] / [facility]us [facility] / STONE Left 2019    CYSTOSCOPY LEFT RETROGRADE PYELOGRAM  LEFT DOUBLE J STENT performed by Katerina Barroso MD at [facility]. [address], COLON, DIAGNOSTIC     [address] REPLACEMENT  2019    LTKR    KNEE ARTHROSCOPY Bilateral 1987    left X2 and Right X1    LITHOTRIPSY Left 2019    LEFT ESWL performed by Katerina Barroso MD at [address] Left 2019    LEFT TOTAL KNEE REPLACEMENT- [address] performed by Vasu Hernandez MD at [facility] History:    Social History     Tobacco Use    Smoking status: Former Smoker     Packs/day: 0.25     Years: 5.00     Pack years: 1.25     Types: Cigarettes     Quit date:      Years since quittin.5    Smokeless tobacco: Never Used   Substance Use Topics    Alcohol use: Yes     Comment: occ.                                 Counseling given: Not Answered      Vital Signs (Current):   Vitals:    21 0722   BP: 136/78   Pulse: 79   Resp: 20   Temp: 98.2 °F (36.8 °C)   TempSrc: Temporal   SpO2: 95%   Weight: 218 lb (98.9 kg)   Height: 5' 7\" (1.702 m)                                              BP Readings from Last 3 Encounters:   21 136/78   21 128/76   06/11/21 116/78       NPO Status: Time of last liquid consumption: 0000                        Time of last solid consumption: 0000                        Date of last liquid consumption: 07/13/21                        Date of last solid food consumption: 07/13/21    BMI:   Wt Readings from Last 3 Encounters:   07/13/21 218 lb (98.9 kg)   07/07/21 218 lb (98.9 kg)   06/11/21 218 lb (98.9 kg)     Body mass index is 34.14 kg/m². CBC:   Lab Results   Component Value Date    WBC 8.0 07/07/2021    RBC 4.81 07/07/2021    HGB 14.6 07/07/2021    HCT 43.8 07/07/2021    MCV 90.9 07/07/2021    RDW 13.5 07/07/2021     07/07/2021       CMP:   Lab Results   Component Value Date     07/07/2021    K 4.2 07/07/2021     07/07/2021    CO2 22 07/07/2021    BUN 13 07/07/2021    CREATININE 0.83 07/07/2021    GFRAA >60.0 07/07/2021    LABGLOM >60.0 07/07/2021    GLUCOSE 103 07/07/2021    GLUCOSE 94 12/10/2011    PROT 6.5 01/19/2019    CALCIUM 9.8 07/07/2021    BILITOT <0.2 01/19/2019    ALKPHOS 54 01/19/2019    AST 20 01/19/2019    ALT 33 01/19/2019       POC Tests: No results for input(s): POCGLU, POCNA, POCK, POCCL, POCBUN, POCHEMO, POCHCT in the last 72 hours.     Coags:   Lab Results   Component Value Date    PROTIME 13.4 12/18/2019    INR 1.0 12/18/2019    APTT 26.6 05/01/2015       HCG (If Applicable): No results found for: PREGTESTUR, PREGSERUM, HCG, HCGQUANT     ABGs: No results found for: PHART, PO2ART, PYM9OKF, KIK3WVS, BEART, S3OTKTDB     Type & Screen (If Applicable):  No results found for: LABABO, LABRH    Drug/Infectious Status (If Applicable):  No results found for: HIV, HEPCAB    COVID-19 Screening (If Applicable): No results found for: COVID19        Anesthesia Evaluation    Airway: Mallampati: II  TM distance: >3 FB   Neck ROM: full  Mouth opening: > = 3 FB Dental: normal exam         Pulmonary:Negative Pulmonary ROS breath sounds clear to auscultation Cardiovascular:    (+) hypertension:, hyperlipidemia      ECG reviewed  Rhythm: regular                      Neuro/Psych:   Negative Neuro/Psych ROS              GI/Hepatic/Renal:            ROS comment: MODERATE OBESITY BMI 34.2. Endo/Other:    (+) hypothyroidism::., .                 Abdominal:             Vascular: negative vascular ROS. Other Findings:             Anesthesia Plan      MAC and Brady block     ASA 2       Induction: intravenous. MIPS: Prophylactic antiemetics administered. Anesthetic plan and risks discussed with patient. Plan discussed with CRNA.     Attending anesthesiologist reviewed and agrees with Preprocedure content              Aleshia Jackson MD   7/13/2021

## 2021-07-13 NOTE — OP NOTE
Operative Note      Patient: Yady Ortega  YOB: 1955  MRN: [id number]    Date of Procedure: 7/13/2021    Pre-Op Diagnosis: CARPAL TUNNEL SYNDROME, RIGHT UPPER LIMB    Post-Op Diagnosis: Same       Procedure(s):  CARPAL TUNNEL RELEASE/REPAIR RIGHT    Surgeon(s): Reggie Galvin MD    Assistant:   Physician Assistant: Tashi Michaels PA-C    Anesthesia: Sadaf Block    Estimated Blood Loss (mL): Minimal    Complications: None    Specimens:   * No specimens in log *    Implants:  * No implants in log *      Drains: * No LDAs found *    Findings: Severe compression of the carpal canal, by a tight carpal ligament    Detailed Description of Procedure: Indications for surgery    Mrs. Lakeshia Ballard is a 70-year-old female, who presents with features of tingling numbness and paresthesia in her right upper extremity. She has had EMG study which revealed evidence of carpal tunnel syndrome in the right hand. Patient had tried all conservative measures and failed. Surgical procedure was explained in detail, the risks and benefits of surgery were also discussed  Risk of anesthesia which is a Biers block, risk of injury to the vessel, nerve, tendon. Postoperative complications such as hematoma formation, infection, DVT, continued pain in the hand, neuroma formation, loss of sensations in the finger, wound healing problems, possible need for second surgery, recurrence of the symptoms, complications from her medical condition have all been discussed  Patient understands all the risks and benefits and consents for the surgical procedure    Operative note    Patient was brought to the operating room and a Biers block was given by the anesthetic services. The right upper extremity was prepped and draped chlorhexidine prep was used. The patient had 2 g of Ancef given prior to the start of the procedure. The timeout was called prior to the start of the procedure. The procedure was done with magnifying loops. An incision was made in line with the ring finger close to the thenar crease. Skin and subtenons tissue were carefully cut and retracted. The incision measured approximately 2 cm. Careful dissection was done in the subcutaneous plane till the carpal ligament was reached. An incision was made into the carpal canal using a 15 blade through the carpal ligament. Care was taken to avoid any damage to the underlying neural and vascular structures. The incision measured about 2 to 3 mm only. Santacruz Running was now introduced into the carpal canal under the carpal ligament. Blunt tipped scissors was taken the decompression was done first proximally. There was significant thickness noticed in the carpal ligament. Care was taken to avoid any damage to the underlying neural and vascular and tendinous structures. After complete decompression had been performed proximally. Nena Right was now passed proximally to make sure that the decompression had been complete. Nena Right was now placed under the carpal ligament and the decompression was done distally. Care was taken once again to avoid any damage to the underlying neural and tendinous structures. The median nerve was noted to be mildly compressed  with evidence of hyperemia present along the area of the carpal canal.  The tendinous structures were moving freely within the carpal canal.  Surgical site was irrigated out completely. Skin was closed with interrupted nylon sutures. The skin was also infiltrated with quarter percent Marcaine. Approximately 8 to 10 cc of Marcaine was infiltrated into the skin. Surgical site was dressed with Xeroform, gauze, web roll and an Ace wrap. Patient made a satisfactory recovery. Postoperative instructions will be provided. She will be evaluated in orthopedic office in approximately 10 days.       Electronically signed by Melissa Tony MD on 7/13/2021 at 9:11 AM

## 2021-07-29 ENCOUNTER — TELEPHONE (OUTPATIENT)
Dept: UROLOGY | Age: 66
End: 2021-07-29

## 2021-07-29 ENCOUNTER — OFFICE VISIT (OUTPATIENT)
Dept: UROLOGY | Age: 66
End: 2021-07-29
Payer: MEDICARE

## 2021-07-29 ENCOUNTER — HOSPITAL ENCOUNTER (OUTPATIENT)
Dept: GENERAL RADIOLOGY | Age: 66
Discharge: HOME | End: 2021-07-31
Payer: MEDICARE

## 2021-07-29 VITALS
HEIGHT: 68 IN | BODY MASS INDEX: 32.58 KG/M2 | DIASTOLIC BLOOD PRESSURE: 88 MMHG | WEIGHT: 215 LBS | SYSTOLIC BLOOD PRESSURE: 134 MMHG | HEART RATE: 78 BPM

## 2021-07-29 DIAGNOSIS — N20.0 KIDNEY STONE: Primary | ICD-10-CM

## 2021-07-29 DIAGNOSIS — N20.0 KIDNEY STONE ON LEFT SIDE: Primary | ICD-10-CM

## 2021-07-29 DIAGNOSIS — N20.0 KIDNEY STONE: ICD-10-CM

## 2021-07-29 PROCEDURE — G8399 PT W/DXA RESULTS DOCUMENT: HCPCS | Performed by: UROLOGY

## 2021-07-29 PROCEDURE — G8427 DOCREV CUR MEDS BY ELIG CLIN: HCPCS | Performed by: UROLOGY

## 2021-07-29 PROCEDURE — G8417 CALC BMI ABV UP PARAM F/U: HCPCS | Performed by: UROLOGY

## 2021-07-29 PROCEDURE — 1036F TOBACCO NON-USER: CPT | Performed by: UROLOGY

## 2021-07-29 PROCEDURE — 99213 OFFICE O/P EST LOW 20 MIN: CPT | Performed by: UROLOGY

## 2021-07-29 PROCEDURE — 1090F PRES/ABSN URINE INCON ASSESS: CPT | Performed by: UROLOGY

## 2021-07-29 PROCEDURE — 74018 RADEX ABDOMEN 1 VIEW: CPT

## 2021-07-29 PROCEDURE — 4040F PNEUMOC VAC/ADMIN/RCVD: CPT | Performed by: UROLOGY

## 2021-07-29 PROCEDURE — 1123F ACP DISCUSS/DSCN MKR DOCD: CPT | Performed by: UROLOGY

## 2021-07-29 PROCEDURE — 3017F COLORECTAL CA SCREEN DOC REV: CPT | Performed by: UROLOGY

## 2021-07-29 ASSESSMENT — ENCOUNTER SYMPTOMS
ABDOMINAL PAIN: 0
ABDOMINAL DISTENTION: 0

## 2021-07-29 NOTE — PROGRESS NOTES
Subjective:      Patient ID: Abraham Hull is a 73 y.o. female. HPI  This is a 75 yo female with h/o HTN, hypothyroidism and elevated cholesterol and with Lt 1 cm renal stone s/p Lt ESWL with stent on 12/23/19 and then stent removal. Since last seen on 7/28/20, she has been doing well and has no renal colic or hematuria or other  complaints. I reviewed the interval KUB and there are  stones in the Lt LP (2-3 mm) and a 8-10 mm stone in midpole (likely larger than last KUB). She did have recent carpal tunnel surgery and is to have the other hand treated soon.        Past Medical History:   Diagnosis Date    Arthritis     both knees    Hyperlipidemia     meds > 10 yrs    Hypertension     meds > 4 yrs    Hyperthyroidism     Hypothyroidism     meds > 20 yrs    Thyroid goiter      Past Surgical History:   Procedure Laterality Date    APPENDECTOMY  1981    with left oophorectomy    BREAST BIOPSY Left 2012    x 2 / both benign    CARPAL TUNNEL RELEASE Right 7/13/2021    CARPAL TUNNEL RELEASE/REPAIR RIGHT performed by Mateusz Smith MD at [address] [place] [address],  [address] Right 07/2021    CHOLECYSTECTOMY  2008    COLONOSCOPY  11-16-07    COLONOSCOPY  [id number]    Michelle Carreon (polyps)    COLONOSCOPY  2/12/16    w/polypectomy     COLONOSCOPY N/A 2/18/2019    COLORECTAL CANCER SCREENING, HIGH RISK performed by Priscila Anna MD at [place] [address]. Left 1/8/2020    FLEXIBLE CYSTOSCOPY LEFT DOUBLE J STENT REMOVAL (RECENT SURGERY 12/23/19) KUB ON ARRIVAL performed by Federica Dempsey MD at [address] [place] [address] / [facility] / STONE Left 12/23/2019    CYSTOSCOPY LEFT RETROGRADE PYELOGRAM  LEFT DOUBLE J STENT performed by Leann Montgomery MD at [facility] COLON, DIAGNOSTIC     [address] REPLACEMENT  06/2019    LTKR    KNEE ARTHROSCOPY Bilateral 1987 2003 1994    left X2 and Right X1    LITHOTRIPSY Left 12/23/2019    LEFT ESWL performed by Freddy Walton MD at [address] [facility] 2019    LEFT TOTAL KNEE REPLACEMENT- Dickie Opitz performed by Aby Bills MD at [address] [facility] History     Socioeconomic History    Marital status:      Spouse name: None    Number of children: None    Years of education: None    Highest education level: None   Occupational History    None   Tobacco Use    Smoking status: Former Smoker     Packs/day: 0.25     Years: 5.00     Pack years: 1.25     Types: Cigarettes     Quit date:      Years since quittin.6    Smokeless tobacco: Never Used   Vaping Use    Vaping Use: Never used   Substance and Sexual Activity    Alcohol use: Yes     Comment: occ.  Drug use: No     Comment: CBD oil to left knee    Sexual activity: None   Other Topics Concern    None   Social History Narrative    None     Social Determinants of Health     Financial Resource Strain: Low Risk     Difficulty of Paying Living Expenses: Not hard at all   Food Insecurity: No Food Insecurity    Worried About Running Out of Food in the Last Year: Never true    Ruba of Food in the Last Year: Never true   Transportation Needs: No Transportation Needs    Lack of Transportation (Medical): No    Lack of Transportation (Non-Medical):  No   Physical Activity:     Days of Exercise per Week:     Minutes of Exercise per Session:    Stress:     Feeling of Stress :    Social Connections:     Frequency of Communication with Friends and Family:     Frequency of Social Gatherings with Friends and Family:     Attends [employer] Services:     Active Member of Clubs or Organizations:     Attends Club or Organization Meetings:     Marital Status:    Intimate Partner Violence:     Fear of Current or Ex-Partner:     Emotionally Abused:     Physically Abused:     Sexually Abused:      Family History   Problem Relation Age of Onset    Heart Disease Mother     Cancer Mother         RENAL CELL    COPD Mother     Diabetes Other     Breast Cancer Other     Colon Cancer Father     Macular Degen Father     Cancer Brother         prostate cancer    No Known Problems Son      Current Outpatient Medications   Medication Sig Dispense Refill    levothyroxine (SYNTHROID) 150 MCG tablet TAKE 1 TABLET BY MOUTH EVERY DAY 90 tablet 1    pravastatin (PRAVACHOL) 40 MG tablet TAKE 1 TABLET BY MOUTH EVERY DAY 90 tablet 1    amLODIPine (NORVASC) 5 MG tablet TAKE 1 TABLET BY MOUTH EVERY DAY 90 tablet 1    erythromycin (ROMYCIN) 5 MG/GM ophthalmic ointment       DAR 0.05 MG/24HR Place 1 patch onto the skin Twice a Week 24 patch 6     No current facility-administered medications for this visit. Antiseptic products, misc.  reviewed    Review of Systems   Constitutional: Negative for fever and unexpected weight change. Gastrointestinal: Negative for abdominal distention and abdominal pain. Genitourinary: Negative for dysuria, flank pain and hematuria. Objective:   Physical Exam  Constitutional:       Appearance: Normal appearance. Abdominal:      General: There is no distension. Neurological:      Mental Status: She is alert.    Psychiatric:         Mood and Affect: Mood normal.       XR ABDOMEN (KUB) (SINGLE AP VIEW)  Status: Final result   Order Providers    Authorizing Encounter Billing   Merlinda Cancer, MD Mollie Board, MD          Signed by    Signed Date/Time Phone Pager   Marbella Javier 2020  9:47 [phone]    Reading Providers    Read Date Phone Pager   Marbella WORRELL 2020  9:47 [phone]    All Reviewers List    Merlinda Cancer, MD on 2020 08:11   Radiation Dose Estimates    No radiation information found for this patient   Narrative   Patient MRN: [id number]       : 1955       Age:  60 years       Gender: Female       Order Date: 2020 7:34 AM.        Exam: XR ABDOMEN (KUB) (SINGLE AP VIEW)     Number of Views: 2        Indication:  Left-sided kidney stone follow-up       Comparison: 1/28/2020       Findings: Multiple left renal calculi, largest of which measures approximately 0.75 cm. Nonobstructive bowel gas pattern. 5 nonrib-bearing vertebral bodies. Mild degenerative changes bilateral hips. Convex left spinal curvature L2-L3 motion segment.           Impression   Impression:     1. Multiple left renal calculi, the largest which measures 0.75 cm.   2.. Mild bilateral osteoarthritis of the hips. 3. Convex left spinal curvature L2-L3 motion segment               Assessment: This is a 75 yo female with h/o HTN, hypothyroidism and elevated cholesterol and s/p prior Lt ESWL for 1 cm Lt renal stone (2019) with some increase in size of larger Lt residual fragment now in mid-pole. She has no symptoms related to the stone and I did discuss elective treatment with ESWL and she is not currently interested. She understands the possibility for acute colic, stone growth and infection without treatment. I again discussed dietary stone prevention. Plan:      1.  F/U 1 yr for KUB (pt choice)        Jaren Martin MD

## 2021-07-30 ENCOUNTER — OFFICE VISIT (OUTPATIENT)
Dept: CARDIOLOGY CLINIC | Age: 66
End: 2021-07-30
Payer: MEDICARE

## 2021-07-30 VITALS
WEIGHT: 218 LBS | DIASTOLIC BLOOD PRESSURE: 80 MMHG | HEART RATE: 72 BPM | HEIGHT: 68 IN | SYSTOLIC BLOOD PRESSURE: 126 MMHG | BODY MASS INDEX: 33.04 KG/M2 | OXYGEN SATURATION: 97 %

## 2021-07-30 DIAGNOSIS — I10 ESSENTIAL HYPERTENSION: Primary | ICD-10-CM

## 2021-07-30 DIAGNOSIS — R00.2 PALPITATIONS: ICD-10-CM

## 2021-07-30 PROCEDURE — 99203 OFFICE O/P NEW LOW 30 MIN: CPT | Performed by: INTERNAL MEDICINE

## 2021-07-30 PROCEDURE — 1123F ACP DISCUSS/DSCN MKR DOCD: CPT | Performed by: INTERNAL MEDICINE

## 2021-07-30 PROCEDURE — G8399 PT W/DXA RESULTS DOCUMENT: HCPCS | Performed by: INTERNAL MEDICINE

## 2021-07-30 PROCEDURE — 1036F TOBACCO NON-USER: CPT | Performed by: INTERNAL MEDICINE

## 2021-07-30 PROCEDURE — 1090F PRES/ABSN URINE INCON ASSESS: CPT | Performed by: INTERNAL MEDICINE

## 2021-07-30 PROCEDURE — 4040F PNEUMOC VAC/ADMIN/RCVD: CPT | Performed by: INTERNAL MEDICINE

## 2021-07-30 PROCEDURE — G8427 DOCREV CUR MEDS BY ELIG CLIN: HCPCS | Performed by: INTERNAL MEDICINE

## 2021-07-30 PROCEDURE — 3017F COLORECTAL CA SCREEN DOC REV: CPT | Performed by: INTERNAL MEDICINE

## 2021-07-30 PROCEDURE — G8417 CALC BMI ABV UP PARAM F/U: HCPCS | Performed by: INTERNAL MEDICINE

## 2021-07-30 NOTE — PROGRESS NOTES
Chief Complaint   Patient presents with    New Patient     Dr Ramy Rodriguez referred     Irregular Heart Beat     history of irregular heart beat    Edema     ankles swollen       7-30-21: Patient presents for initial medical evaluation. Patient is followed on a regular basis by Dr. Rg Mckinney MD. Patient with hx of thyroid disease. Had change in her dose recenlty and since her heart palpitations/irregular heart beat disappeared. Pt denies chest pain, dyspnea, dyspnea on exertion, change in exercise capacity, fatigue,  nausea, vomiting, diarrhea, constipation, motor weakness, insomnia, weight loss, syncope, dizziness, lightheadedness, palpitations, PND, orthopnea, or claudication. + hx of HTN and HLD. No DM or smoking.    S/p echo which was normal.     EKG with NSR + PVC      Patient Active Problem List   Diagnosis    HTN (hypertension)    Hyperlipidemia    Hypothyroidism    Thyroid goiter    Obesity    Adenomatous polyp of ascending colon    Osteoarthritis of left knee    Left renal stone    Arthritis of knee    Calculus of kidney    Palpitations       Past Surgical History:   Procedure Laterality Date    APPENDECTOMY  1981    with left oophorectomy    BREAST BIOPSY Left 2012    x 2 / both benign    CARPAL TUNNEL RELEASE Right 7/13/2021    CARPAL TUNNEL RELEASE/REPAIR RIGHT performed by Richie Orlando MD at [address] [place] [address],  [address] Right 07/2021    CHOLECYSTECTOMY  2008    COLONOSCOPY  11-16-07    COLONOSCOPY  614857    Loren Carreon (polyps)    COLONOSCOPY  2/12/16    w/polypectomy     COLONOSCOPY N/A 2/18/2019    COLORECTAL CANCER SCREENING, HIGH RISK performed by Jennifer Johnson MD at [place] [address]. Left 1/8/2020    FLEXIBLE CYSTOSCOPY LEFT DOUBLE J STENT REMOVAL (RECENT SURGERY 12/23/19) KUB ON ARRIVAL performed by Toni Hart MD at [address] [place] [address] / Mercedes Peraza / JORGE Left 12/23/2019    CYSTOSCOPY LEFT RETROGRADE PYELOGRAM  LEFT DOUBLE J STENT performed by Marek Mathias MD at [facility] [address], DIAGNOSTIC     [address] REPLACEMENT  2019    LTKR    KNEE ARTHROSCOPY Bilateral 1987 2003    left X2 and Right X1    LITHOTRIPSY Left 2019    LEFT ESWL performed by Marek Mathias MD at [address] Left 2019    LEFT TOTAL KNEE REPLACEMENT- [address] performed by Gisell Charles MD at [facility] History     Socioeconomic History    Marital status:      Spouse name: None    Number of children: None    Years of education: None    Highest education level: None   Occupational History    None   Tobacco Use    Smoking status: Former Smoker     Packs/day: 0.25     Years: 5.00     Pack years: 1.25     Types: Cigarettes     Quit date:      Years since quittin.6    Smokeless tobacco: Never Used   Vaping Use    Vaping Use: Never used   Substance and Sexual Activity    Alcohol use: Yes     Comment: occ.  Drug use: No     Comment: CBD oil to left knee    Sexual activity: None   Other Topics Concern    None   Social History Narrative    None     Social Determinants of Health     Financial Resource Strain: Low Risk     Difficulty of Paying Living Expenses: Not hard at all   Food Insecurity: No Food Insecurity    Worried About Running Out of Food in the Last Year: Never true    Ruba of Food in the Last Year: Never true   Transportation Needs: No Transportation Needs    Lack of Transportation (Medical): No    Lack of Transportation (Non-Medical):  No   Physical Activity:     Days of Exercise per Week:     Minutes of Exercise per Session:    Stress:     Feeling of Stress :    Social Connections:     Frequency of Communication with Friends and Family:     Frequency of Social Gatherings with Friends and Family:     Attends Jainism Services:     Active Member of Clubs or Organizations:     Attends [employer] or Organization Meetings:     Marital Status:    Intimate Partner Violence:     Fear of Current or Ex-Partner:     Emotionally Abused:     Physically Abused:     Sexually Abused:        Family History   Problem Relation Age of Onset    Heart Disease Mother     Cancer Mother         RENAL CELL    COPD Mother     Heart Attack Mother     Diabetes Other     Breast Cancer Other     Colon Cancer Father     Macular Degen Father     Cancer Brother         prostate cancer    No Known Problems Son        Current Outpatient Medications   Medication Sig Dispense Refill    levothyroxine (SYNTHROID) 150 MCG tablet TAKE 1 TABLET BY MOUTH EVERY DAY 90 tablet 1    pravastatin (PRAVACHOL) 40 MG tablet TAKE 1 TABLET BY MOUTH EVERY DAY 90 tablet 1    amLODIPine (NORVASC) 5 MG tablet TAKE 1 TABLET BY MOUTH EVERY DAY 90 tablet 1    erythromycin (ROMYCIN) 5 MG/GM ophthalmic ointment       DAR 0.05 MG/24HR Place 1 patch onto the skin Twice a Week 24 patch 6     No current facility-administered medications for this visit. Antiseptic products, misc. Review of Systems:  General ROS: negative  Psychological ROS: negative  Hematological and Lymphatic ROS: No history of blood clots or bleeding disorder. Respiratory ROS: no cough, shortness of breath, or wheezing  Cardiovascular ROS: no chest pain or dyspnea on exertion  Gastrointestinal ROS: no abdominal pain, change in bowel habits, or black or bloody stools  Genito-Urinary ROS: no dysuria, trouble voiding, or hematuria  Musculoskeletal ROS: negative  Neurological ROS: no TIA or stroke symptoms  Dermatological ROS: negative    VITALS:  Blood pressure 126/80, pulse 72, height 5' 7.5\" (1.715 m), weight 218 lb (98.9 kg), last menstrual period 09/27/1998, SpO2 97 %, not currently breastfeeding. Body mass index is 33.64 kg/m².     Physical Examination:  General appearance - alert, well appearing, and in no distress  Mental status - alert, oriented to person, place, and time  Neck - Neck is supple, no JVD or carotid bruits. No thyromegaly or adenopathy. Chest - clear to auscultation, no wheezes, rales or rhonchi, symmetric air entry  Heart - normal rate, regular rhythm, normal S1, S2, no murmurs, rubs, clicks or gallops  Abdomen - soft, nontender, nondistended, no masses or organomegaly  Neurological - alert, oriented, normal speech, no focal findings or movement disorder noted  Extremities - peripheral pulses normal, no pedal edema, no clubbing or cyanosis  Skin - normal coloration and turgor, no rashes, no suspicious skin lesions noted      EKG: normal sinus rhythm, nonspecific ST and T waves changes    No orders of the defined types were placed in this encounter. ASSESSMENT:     Diagnosis Orders   1. Essential hypertension     2. Palpitations           PLAN:     P    As always, aggressive risk factor modification is strongly recommended. We should adhere to the JNC VIII guidelines for HTN management and the NCEP ATP III guidelines for LDL-C management. Cardiac diet is always recommended with low fat, cholesterol, calories and sodium. Continue medications at current doses. No further cardiac testing is warranted at this time. Patient is completely asymptomatic her symptoms are attributed to thyroid disease/change in medications.     We will consider event monitor in the future if symptoms recur

## 2021-08-02 ENCOUNTER — HOSPITAL ENCOUNTER (OUTPATIENT)
Dept: ULTRASOUND IMAGING | Age: 66
Discharge: HOME | End: 2021-08-04
Payer: MEDICARE

## 2021-08-02 DIAGNOSIS — E04.9 GOITER: ICD-10-CM

## 2021-08-02 PROCEDURE — 76536 US EXAM OF HEAD AND NECK: CPT

## 2021-08-05 ENCOUNTER — OFFICE VISIT (OUTPATIENT)
Dept: FAMILY MEDICINE CLINIC | Age: 66
End: 2021-08-05
Payer: MEDICARE

## 2021-08-05 VITALS
TEMPERATURE: 94.5 F | SYSTOLIC BLOOD PRESSURE: 126 MMHG | DIASTOLIC BLOOD PRESSURE: 70 MMHG | HEIGHT: 68 IN | WEIGHT: 217 LBS | OXYGEN SATURATION: 95 % | BODY MASS INDEX: 32.89 KG/M2 | HEART RATE: 82 BPM

## 2021-08-05 DIAGNOSIS — Z01.818 PRE-OP EXAMINATION: Primary | ICD-10-CM

## 2021-08-05 PROCEDURE — 1090F PRES/ABSN URINE INCON ASSESS: CPT | Performed by: PHYSICIAN ASSISTANT

## 2021-08-05 PROCEDURE — G8427 DOCREV CUR MEDS BY ELIG CLIN: HCPCS | Performed by: PHYSICIAN ASSISTANT

## 2021-08-05 PROCEDURE — G8417 CALC BMI ABV UP PARAM F/U: HCPCS | Performed by: PHYSICIAN ASSISTANT

## 2021-08-05 PROCEDURE — 99213 OFFICE O/P EST LOW 20 MIN: CPT | Performed by: PHYSICIAN ASSISTANT

## 2021-08-05 ASSESSMENT — PATIENT HEALTH QUESTIONNAIRE - PHQ9
1. LITTLE INTEREST OR PLEASURE IN DOING THINGS: 0
SUM OF ALL RESPONSES TO PHQ QUESTIONS 1-9: 0
DEPRESSION UNABLE TO ASSESS: PT REFUSES

## 2021-08-05 NOTE — PROGRESS NOTES
Preoperative Consultation      Belgica Kramer  YOB: 1955    Date of Service:  8/5/2021    Vitals:    08/05/21 0904   BP: 126/70   Site: Right Upper Arm   Position: Sitting   Cuff Size: Small Adult   Pulse: 82   Temp: 94.5 °F (34.7 °C)   SpO2: 95%   Weight: 217 lb (98.4 kg)   Height: 5' 7.5\" (1.715 m)      Wt Readings from Last 2 Encounters:   08/05/21 217 lb (98.4 kg)   07/30/21 218 lb (98.9 kg)     BP Readings from Last 3 Encounters:   08/05/21 126/70   07/30/21 126/80   07/29/21 134/88        Chief Complaint   Patient presents with   [employer] Pre-op Exam     Patient is here for pre-op examination. Allergies   Allergen Reactions    Antiseptic Products, Misc. Rash     duraprep     Outpatient Medications Marked as Taking for the 8/5/21 encounter (Office Visit) with LARON Sim   Medication Sig Dispense Refill    levothyroxine (SYNTHROID) 150 MCG tablet TAKE 1 TABLET BY MOUTH EVERY DAY 90 tablet 1    pravastatin (PRAVACHOL) 40 MG tablet TAKE 1 TABLET BY MOUTH EVERY DAY 90 tablet 1    amLODIPine (NORVASC) 5 MG tablet TAKE 1 TABLET BY MOUTH EVERY DAY 90 tablet 1    erythromycin (ROMYCIN) 5 MG/GM ophthalmic ointment       DAR 0.05 MG/24HR Place 1 patch onto the skin Twice a Week 24 patch 6       This patient presents to the office today for a preoperative consultation at the request of surgeon, Dr. Betsy Andrews, who plans on performing Carpal tunnel syndrome left  on August 10 at [facility]. The current problem began 7 months ago, and symptoms have been worsening with time. Conservative therapy: N/A.     Planned anesthesia: General   Known anesthesia problems: None   Bleeding risk: No recent or remote history of abnormal bleeding  Personal or FH of DVT/PE: No    Patient objection to receiving blood products: No    Patient Active Problem List   Diagnosis    HTN (hypertension)    Hyperlipidemia    Hypothyroidism    Thyroid goiter    Obesity    Adenomatous polyp of ascending colon    Osteoarthritis of left knee    Left renal stone    Arthritis of knee    Calculus of kidney    Palpitations    Pre-op examination       Past Medical History:   Diagnosis Date    Arthritis     both knees    Hyperlipidemia     meds > 10 yrs    Hypertension     meds > 4 yrs    Hyperthyroidism     Hypothyroidism     meds > 20 yrs    Palpitations 7/30/2021    Thyroid goiter      Past Surgical History:   Procedure Laterality Date    APPENDECTOMY  1981    with left oophorectomy    BREAST BIOPSY Left 2012    x 2 / both benign    CARPAL TUNNEL RELEASE Right 7/13/2021    CARPAL TUNNEL RELEASE/REPAIR RIGHT performed by Mtaeusz Smith MD at [facility], [address] Right 07/2021    CHOLECYSTECTOMY  2008    COLONOSCOPY  11-16-07    COLONOSCOPY  350296    Michelle Carreon (polyps)    COLONOSCOPY  2/12/16    w/polypectomy     COLONOSCOPY N/A 2/18/2019    COLORECTAL CANCER SCREENING, HIGH RISK performed by Priscila Anna MD at [place] [address]. Left 1/8/2020    FLEXIBLE CYSTOSCOPY LEFT DOUBLE J STENT REMOVAL (RECENT SURGERY 12/23/19) KUB ON ARRIVAL performed by Federica Dempsey MD at [address] [place] [address] / [facility] / STONE Left 12/23/2019    CYSTOSCOPY LEFT RETROGRADE PYELOGRAM  LEFT DOUBLE J STENT performed by Leann Montgomery MD at [address] [place] [address] [address]  06/2019    LTKR    KNEE ARTHROSCOPY Bilateral 1987 2003 1994    left X2 and Right X1    LITHOTRIPSY Left 12/23/2019    LEFT ESWL performed by Leann Montgomery MD at [facility] Left 6/19/2019    LEFT TOTAL KNEE REPLACEMENT- DEPUY performed by Aleah Solorzano MD at [address] History   Problem Relation Age of Onset    Heart Disease Mother     Cancer Mother         RENAL CELL    COPD Mother     Heart Attack Mother     Diabetes Other     Breast Cancer Other     Colon Cancer Father     Macular Degen Father     Cancer Brother         prostate cancer    No Known Problems Son      Social History     Socioeconomic History    Marital status:      Spouse name: Not on file    Number of children: Not on file    Years of education: Not on file    Highest education level: Not on file   Occupational History    Not on file   Tobacco Use    Smoking status: Former Smoker     Packs/day: 0.25     Years: 5.00     Pack years: 1.25     Types: Cigarettes     Quit date:      Years since quittin.6    Smokeless tobacco: Never Used   Vaping Use    Vaping Use: Never used   Substance and Sexual Activity    Alcohol use: Yes     Comment: occ.  Drug use: No     Comment: CBD oil to left knee    Sexual activity: Not on file   Other Topics Concern    Not on file   Social History Narrative    Not on file     Social Determinants of Health     Financial Resource Strain: Low Risk     Difficulty of Paying Living Expenses: Not hard at all   Food Insecurity: No Food Insecurity    Worried About Running Out of Food in the Last Year: Never true    Ruba of Food in the Last Year: Never true   Transportation Needs: No Transportation Needs    Lack of Transportation (Medical): No    Lack of Transportation (Non-Medical): No   Physical Activity:     Days of Exercise per Week:     Minutes of Exercise per Session:    Stress:     Feeling of Stress :    Social Connections:     Frequency of Communication with Friends and Family:     Frequency of Social Gatherings with Friends and Family:     Attends Worship Services:     Active Member of Clubs or Organizations:     Attends Club or Organization Meetings:     Marital Status:    Intimate Partner Violence:     Fear of Current or Ex-Partner:     Emotionally Abused:     Physically Abused:     Sexually Abused:        Review of Systems  A comprehensive review of systems was negative except for what was noted in the HPI.      Physical Exam   Constitutional: She is oriented to person, place, and time. She appears well-developed and well-nourished. No distress. HENT:   Head: Normocephalic and atraumatic. Mouth/Throat: Uvula is midline, oropharynx is clear and moist and mucous membranes are normal.   Eyes: Conjunctivae and EOM are normal. Pupils are equal, round, and reactive to light. Neck: Trachea normal and normal range of motion. Neck supple. No JVD present. Carotid bruit is not present. No mass and no thyromegaly present. Cardiovascular: Normal rate, regular rhythm, normal heart sounds and intact distal pulses. Exam reveals no gallop and no friction rub. No murmur heard. Pulmonary/Chest: Effort normal and breath sounds normal. No respiratory distress. She has no wheezes. She has no rales. Abdominal: Soft. Normal aorta and bowel sounds are normal. She exhibits no distension and no mass. There is no hepatosplenomegaly. No tenderness. Musculoskeletal: She exhibits no edema and no tenderness. Neurological: She is alert and oriented to person, place, and time. She has normal strength. No cranial nerve deficit or sensory deficit. Coordination and gait normal.   Skin: Skin is warm and dry. No rash noted. No erythema. Psychiatric: She has a normal mood and affect. Her behavior is normal.     EKG Interpretation:  normal EKG, normal sinus rhythm, unchanged from previous tracings.     Lab Review   Orders Only on 07/07/2021   Component Date Value    Sodium 07/07/2021 141     Potassium 07/07/2021 4.2     Chloride 07/07/2021 105     CO2 07/07/2021 22     Anion Gap 07/07/2021 14     Glucose 07/07/2021 103*    BUN 07/07/2021 13     CREATININE 07/07/2021 0.83     GFR Non- 07/07/2021 >60.0     GFR  07/07/2021 >60.0     Calcium 07/07/2021 9.8     WBC 07/07/2021 8.0     RBC 07/07/2021 4.81     Hemoglobin 07/07/2021 14.6     Hematocrit 07/07/2021 43.8     MCV 07/07/2021 90.9     MCH 07/07/2021 30.4  MCHC 07/07/2021 33.5     RDW 07/07/2021 13.5     Platelets 80/68/1511 295    Orders Only on 06/09/2021   Component Date Value    TSH 06/09/2021 3.580     T4 Free 06/09/2021 1.30    Hospital Outpatient Visit on 06/04/2021   Component Date Value    Left Ventricular Ejectio* 06/04/2021 55     LVEF MODALITY 06/04/2021 ECHO    Orders Only on 03/05/2021   Component Date Value    TSH 03/05/2021 9.930*    T4 Free 03/05/2021 1.33     Sodium 03/05/2021 138     Potassium 03/05/2021 4.6     Chloride 03/05/2021 102     CO2 03/05/2021 23     Anion Gap 03/05/2021 13     Glucose 03/05/2021 111*    BUN 03/05/2021 16     CREATININE 03/05/2021 1.05*    GFR Non- 03/05/2021 52.6*    GFR  03/05/2021 >60.0     Calcium 03/05/2021 9.3     Cholesterol, Total 03/05/2021 184     Triglycerides 03/05/2021 221*    HDL 03/05/2021 31*    LDL Calculated 03/05/2021 109    Office Visit on 03/05/2021   Component Date Value    Hemoglobin A1C 03/05/2021 5.9            Assessment:       73 y.o. patient with planned surgery as above. Known risk factors for perioperative complications: Hypertension  Current medications which may produce withdrawal symptoms if withheld perioperatively: none      Plan:     1. Preoperative workup as follows: hemoglobin, hematocrit  2. Change in medication regimen before surgery: None  3.  Prophylaxis for cardiac events with perioperative beta-blockers: none  ACC/AHA indications for pre-operative beta-blocker use:    · Vascular surgery with history of postitive stress test  · Intermediate or high risk surgery with history of CAD   · Intermediate or high risk surgery with multiple clinical predictors of CAD- 2 of the following: history of compensated or prior heart failure, history of cerebrovascular disease, DM, or renal insufficiency    Routine administration of higher-dose, long-acting metoprolol in beta-blockernaïve patients on the day of surgery, and in the absence of dose titration is associated with an overall increase in mortality. Beta-blockers should be started days to weeks prior to surgery and titrated to pulse < 70.  4. Deep vein thrombosis prophylaxis: regimen to be chosen by surgical team  5.  Please review above information for surgical clearance

## 2021-08-10 ENCOUNTER — ANESTHESIA EVENT (OUTPATIENT)
Dept: OPERATING ROOM | Age: 66
End: 2021-08-10
Payer: MEDICARE

## 2021-08-10 ENCOUNTER — HOSPITAL ENCOUNTER (OUTPATIENT)
Age: 66
Setting detail: OUTPATIENT SURGERY
Discharge: HOME | End: 2021-08-10
Attending: ORTHOPAEDIC SURGERY | Admitting: ORTHOPAEDIC SURGERY
Payer: MEDICARE

## 2021-08-10 ENCOUNTER — ANESTHESIA (OUTPATIENT)
Dept: OPERATING ROOM | Age: 66
End: 2021-08-10
Payer: MEDICARE

## 2021-08-10 VITALS
RESPIRATION RATE: 20 BRPM | OXYGEN SATURATION: 96 % | TEMPERATURE: 97 F | SYSTOLIC BLOOD PRESSURE: 138 MMHG | HEART RATE: 72 BPM | DIASTOLIC BLOOD PRESSURE: 72 MMHG | HEIGHT: 68 IN | BODY MASS INDEX: 32.89 KG/M2 | WEIGHT: 217 LBS

## 2021-08-10 VITALS — OXYGEN SATURATION: 98 % | DIASTOLIC BLOOD PRESSURE: 65 MMHG | SYSTOLIC BLOOD PRESSURE: 119 MMHG

## 2021-08-10 PROCEDURE — 3700000001 HC ADD 15 MINUTES (ANESTHESIA): Performed by: ORTHOPAEDIC SURGERY

## 2021-08-10 PROCEDURE — 3700000000 HC ANESTHESIA ATTENDED CARE: Performed by: ORTHOPAEDIC SURGERY

## 2021-08-10 PROCEDURE — 7100000010 HC PHASE II RECOVERY - FIRST 15 MIN: Performed by: ORTHOPAEDIC SURGERY

## 2021-08-10 PROCEDURE — 2500000003 HC RX 250 WO HCPCS: Performed by: NURSE ANESTHETIST, CERTIFIED REGISTERED

## 2021-08-10 PROCEDURE — 2709999900 HC NON-CHARGEABLE SUPPLY: Performed by: ORTHOPAEDIC SURGERY

## 2021-08-10 PROCEDURE — 2580000003 HC RX 258: Performed by: ORTHOPAEDIC SURGERY

## 2021-08-10 PROCEDURE — 2580000003 HC RX 258: Performed by: NURSE ANESTHETIST, CERTIFIED REGISTERED

## 2021-08-10 PROCEDURE — 3600000013 HC SURGERY LEVEL 3 ADDTL 15MIN: Performed by: ORTHOPAEDIC SURGERY

## 2021-08-10 PROCEDURE — 7100000011 HC PHASE II RECOVERY - ADDTL 15 MIN: Performed by: ORTHOPAEDIC SURGERY

## 2021-08-10 PROCEDURE — 3600000003 HC SURGERY LEVEL 3 BASE: Performed by: ORTHOPAEDIC SURGERY

## 2021-08-10 PROCEDURE — 2580000003 HC RX 258: Performed by: ANESTHESIOLOGY

## 2021-08-10 PROCEDURE — 6360000002 HC RX W HCPCS: Performed by: ORTHOPAEDIC SURGERY

## 2021-08-10 PROCEDURE — 2500000003 HC RX 250 WO HCPCS: Performed by: ORTHOPAEDIC SURGERY

## 2021-08-10 RX ORDER — SODIUM CHLORIDE 0.9 % (FLUSH) 0.9 %
10 SYRINGE (ML) INJECTION EVERY 12 HOURS SCHEDULED
Status: DISCONTINUED | OUTPATIENT
Start: 2021-08-10 | End: 2021-08-10 | Stop reason: HOSPADM

## 2021-08-10 RX ORDER — PROPOFOL 10 MG/ML
INJECTION, EMULSION INTRAVENOUS CONTINUOUS PRN
Status: DISCONTINUED | OUTPATIENT
Start: 2021-08-10 | End: 2021-08-10 | Stop reason: SDUPTHER

## 2021-08-10 RX ORDER — SODIUM CHLORIDE 9 MG/ML
25 INJECTION, SOLUTION INTRAVENOUS PRN
Status: DISCONTINUED | OUTPATIENT
Start: 2021-08-10 | End: 2021-08-10 | Stop reason: HOSPADM

## 2021-08-10 RX ORDER — MAGNESIUM HYDROXIDE 1200 MG/15ML
LIQUID ORAL CONTINUOUS PRN
Status: COMPLETED | OUTPATIENT
Start: 2021-08-10 | End: 2021-08-10

## 2021-08-10 RX ORDER — LIDOCAINE HYDROCHLORIDE 20 MG/ML
INJECTION, SOLUTION EPIDURAL; INFILTRATION; INTRACAUDAL; PERINEURAL PRN
Status: DISCONTINUED | OUTPATIENT
Start: 2021-08-10 | End: 2021-08-10 | Stop reason: SDUPTHER

## 2021-08-10 RX ORDER — LIDOCAINE HYDROCHLORIDE 10 MG/ML
1 INJECTION, SOLUTION EPIDURAL; INFILTRATION; INTRACAUDAL; PERINEURAL
Status: DISCONTINUED | OUTPATIENT
Start: 2021-08-10 | End: 2021-08-10 | Stop reason: HOSPADM

## 2021-08-10 RX ORDER — BUPIVACAINE HYDROCHLORIDE 2.5 MG/ML
INJECTION, SOLUTION EPIDURAL; INFILTRATION; INTRACAUDAL PRN
Status: DISCONTINUED | OUTPATIENT
Start: 2021-08-10 | End: 2021-08-10 | Stop reason: ALTCHOICE

## 2021-08-10 RX ORDER — SODIUM CHLORIDE, SODIUM LACTATE, POTASSIUM CHLORIDE, CALCIUM CHLORIDE 600; 310; 30; 20 MG/100ML; MG/100ML; MG/100ML; MG/100ML
INJECTION, SOLUTION INTRAVENOUS CONTINUOUS
Status: DISCONTINUED | OUTPATIENT
Start: 2021-08-10 | End: 2021-08-10 | Stop reason: HOSPADM

## 2021-08-10 RX ORDER — SODIUM CHLORIDE, SODIUM LACTATE, POTASSIUM CHLORIDE, CALCIUM CHLORIDE 600; 310; 30; 20 MG/100ML; MG/100ML; MG/100ML; MG/100ML
INJECTION, SOLUTION INTRAVENOUS CONTINUOUS PRN
Status: DISCONTINUED | OUTPATIENT
Start: 2021-08-10 | End: 2021-08-10 | Stop reason: SDUPTHER

## 2021-08-10 RX ORDER — SODIUM CHLORIDE 0.9 % (FLUSH) 0.9 %
10 SYRINGE (ML) INJECTION PRN
Status: DISCONTINUED | OUTPATIENT
Start: 2021-08-10 | End: 2021-08-10 | Stop reason: HOSPADM

## 2021-08-10 RX ADMIN — LIDOCAINE HYDROCHLORIDE 20 MG: 20 INJECTION, SOLUTION EPIDURAL; INFILTRATION; INTRACAUDAL; PERINEURAL at 08:38

## 2021-08-10 RX ADMIN — CEFAZOLIN 2000 MG: 10 INJECTION, POWDER, FOR SOLUTION INTRAVENOUS at 08:41

## 2021-08-10 RX ADMIN — PROPOFOL 150 MCG/KG/MIN: 10 INJECTION, EMULSION INTRAVENOUS at 08:38

## 2021-08-10 RX ADMIN — SODIUM CHLORIDE, POTASSIUM CHLORIDE, SODIUM LACTATE AND CALCIUM CHLORIDE: 600; 310; 30; 20 INJECTION, SOLUTION INTRAVENOUS at 08:35

## 2021-08-10 RX ADMIN — SODIUM CHLORIDE, POTASSIUM CHLORIDE, SODIUM LACTATE AND CALCIUM CHLORIDE: 600; 310; 30; 20 INJECTION, SOLUTION INTRAVENOUS at 07:21

## 2021-08-10 ASSESSMENT — PULMONARY FUNCTION TESTS
PIF_VALUE: 0
PIF_VALUE: 1
PIF_VALUE: 0
PIF_VALUE: 1
PIF_VALUE: 0
PIF_VALUE: 2
PIF_VALUE: 0
PIF_VALUE: 0
PIF_VALUE: 1
PIF_VALUE: 0
PIF_VALUE: 0
PIF_VALUE: 1
PIF_VALUE: 0

## 2021-08-10 NOTE — PROGRESS NOTES
IV discontinued, catheter intact, dressing applied. Left fingers warm and mobile, dressing clean and dry, denies pain.

## 2021-08-10 NOTE — PROGRESS NOTES
Received from or into short stay    Awake and responsive. sao2 95% breath sounds clear to anterior auscultation. Left hand/wrist dressing clean and dry, fingrs warm and mobile with brisk capillary refill noted, patient denies pain at this time. Ice applied to left wrist, pillow beneath left arm.

## 2021-08-10 NOTE — PROGRESS NOTES
Pt assisted up to bathroom gait steady , voided without difficulty, back to cart, Requesting to go home. Instructions reviewed, patient verbalized understanding.

## 2021-08-10 NOTE — ANESTHESIA POSTPROCEDURE EVALUATION
Department of Anesthesiology  Postprocedure Note    Patient: Marita Valerio  MRN: [id number]  YOB: 1955  Date of evaluation: 8/10/2021  Time:  9:14 AM     Procedure Summary     Date: 08/10/21 Room / Location: [address]    Anesthesia Start: 5671 Anesthesia Stop: 8626    Procedure: CARPAL TUNNEL RELEASE/ REPAIR LEFT, PAT AT [place] (Left ) Diagnosis: (LEFT UPPER LIMB CARPAL TUNNEL SYNDROME)    Surgeons: Trey Stanley MD Responsible Provider: Jose Carlos Cope MD    Anesthesia Type: [place] block ASA Status: 3          Anesthesia Type: [place] block    Joseline Phase I: Joseline Score: 10    Joseline Phase II:      Last vitals: Reviewed and per EMR flowsheets.        Anesthesia Post Evaluation    Patient location during evaluation: ICU (phase 2)  Patient participation: complete - patient participated  Level of consciousness: awake  Pain score: 0  Airway patency: patent  Nausea & Vomiting: no nausea and no vomiting  Complications: no  Cardiovascular status: blood pressure returned to baseline and hemodynamically stable  Respiratory status: acceptable and nasal cannula  Hydration status: stable

## 2021-08-10 NOTE — ANESTHESIA PROCEDURE NOTES
Peripheral Block    Patient location during procedure: OR  Start time: 8/10/2021 8:38 AM  End time: 8/10/2021 8:43 AM  Staffing  Performed: resident/CRNA   Resident/CRNA: GIAN Vigil CRNA  Preanesthetic Checklist  Completed: patient identified, IV checked, site marked, risks and benefits discussed, surgical consent, monitors and equipment checked, pre-op evaluation, timeout performed, anesthesia consent given, oxygen available and patient being monitored  Peripheral Block  Patient position: supine  Prep: alcohol swabs  Patient monitoring: cardiac monitor, continuous pulse ox, continuous capnometry, frequent blood pressure checks and IV access  Block type: Sadaf block  Laterality: left  Injection technique: single-shot  Guidance: other  Local infiltration: lidocaine  Infiltration strength: 0.5 %  Dose: 40 mL  Local infiltration: lidocaine  Additional Notes  Eschmark and tourniquet up prior to injection  Reason for block: primary anesthetic

## 2021-08-10 NOTE — ANESTHESIA PRE PROCEDURE
Department of Anesthesiology  Preprocedure Note       Name:  Jc Gudino   Age:  73 y.o.  :  1955                                          MRN:  [id number]         Date:  8/10/2021      Surgeon: Mitchell Sutton): Renee Murillo MD    Procedure: Procedure(s):  CARPAL TUNNEL RELEASE/ REPAIR LEFT, PAT AT [facility]    Medications prior to admission:   Prior to Admission medications    Medication Sig Start Date End Date Taking? Authorizing Provider   levothyroxine (SYNTHROID) 150 MCG tablet TAKE 1 TABLET BY MOUTH EVERY DAY 21   Amalia Roblero MD   pravastatin (PRAVACHOL) 40 MG tablet TAKE 1 TABLET BY MOUTH EVERY DAY 21   Amaila Roblero MD   amLODIPine (NORVASC) 5 MG tablet TAKE 1 TABLET BY MOUTH EVERY DAY 21   Ro Bennett MD   erythromycin [facility]) 5 MG/GM ophthalmic ointment  21   Historical Provider, MD   DAR 0.05 MG/24HR Place 1 patch onto the skin Twice a Week 20   Jocelyn Vazquez DO       Current medications:    No current facility-administered medications for this encounter. Current Outpatient Medications   Medication Sig Dispense Refill    levothyroxine (SYNTHROID) 150 MCG tablet TAKE 1 TABLET BY MOUTH EVERY DAY 90 tablet 1    pravastatin (PRAVACHOL) 40 MG tablet TAKE 1 TABLET BY MOUTH EVERY DAY 90 tablet 1    amLODIPine (NORVASC) 5 MG tablet TAKE 1 TABLET BY MOUTH EVERY DAY 90 tablet 1    erythromycin (ROMYCIN) 5 MG/GM ophthalmic ointment       DAR 0.05 MG/24HR Place 1 patch onto the skin Twice a Week 24 patch 6       Allergies: Allergies   Allergen Reactions    Antiseptic Products, Misc.  Rash     duraprep       Problem List:    Patient Active Problem List   Diagnosis Code    HTN (hypertension) I10    Hyperlipidemia E78.5    Hypothyroidism E03.9    Thyroid goiter E04.9    Obesity E66.9    Adenomatous polyp of ascending colon D12.2    Osteoarthritis of left knee M17.12    Left renal stone N20.0    Arthritis of knee M17.10    Calculus of kidney N20.0  Palpitations R00.2    Pre-op examination Z01.818       Past Medical History:        Diagnosis Date    Arthritis     both knees    Hyperlipidemia     meds > 10 yrs    Hypertension     meds > 4 yrs    Hyperthyroidism     Hypothyroidism     meds > 20 yrs    Palpitations 2021    Thyroid goiter        Past Surgical History:        Procedure Laterality Date    APPENDECTOMY      with left oophorectomy    BREAST BIOPSY Left 2012    x 2 / both benign    CARPAL TUNNEL RELEASE Right 2021    CARPAL TUNNEL RELEASE/REPAIR RIGHT performed by Per Crain MD at [address] Right 2021    CHOLECYSTECTOMY      COLONOSCOPY  07    COLONOSCOPY  [id number]    Eric Carreon (polyps)    COLONOSCOPY  16    w/polypectomy     COLONOSCOPY N/A 2019    COLORECTAL CANCER SCREENING, HIGH RISK performed by Félix Jenkins MD at [address] Left 2020    FLEXIBLE CYSTOSCOPY LEFT DOUBLE J STENT REMOVAL (RECENT SURGERY 19) KUB ON ARRIVAL performed by Drea Rivera MD at [address] [facility] [address] [facility] [address] / [facility] / STONE Left 2019    CYSTOSCOPY LEFT RETROGRADE PYELOGRAM  LEFT DOUBLE J STENT performed by Katerina Barroso MD at [facility]. [address], COLON, DIAGNOSTIC     [address] REPLACEMENT  2019    LTKR    KNEE ARTHROSCOPY Bilateral 1987    left X2 and Right X1    LITHOTRIPSY Left 2019    LEFT ESWL performed by Katerina Barroso MD at [facility] Left 2019    LEFT TOTAL KNEE REPLACEMENT- [address] performed by Vasu Hernandez MD at [facility] [address] History:    Social History     Tobacco Use    Smoking status: Former Smoker     Packs/day: 0.25     Years: 5.00     Pack years: 1.25     Types: Cigarettes     Quit date:      Years since quittin.6    Smokeless tobacco: Never Used   Substance Use Topics    Alcohol use:  Yes Comment: occ. Counseling given: Not Answered      Vital Signs (Current): There were no vitals filed for this visit. BP Readings from Last 3 Encounters:   08/05/21 126/70   07/30/21 126/80   07/29/21 134/88       NPO Status:                                                                                 BMI:   Wt Readings from Last 3 Encounters:   08/05/21 217 lb (98.4 kg)   07/30/21 218 lb (98.9 kg)   07/29/21 215 lb (97.5 kg)     There is no height or weight on file to calculate BMI.    CBC:   Lab Results   Component Value Date    WBC 7.9 08/05/2021    RBC 4.74 08/05/2021    HGB 14.5 08/05/2021    HCT 43.7 08/05/2021    MCV 92.1 08/05/2021    RDW 13.8 08/05/2021     08/05/2021       CMP:   Lab Results   Component Value Date     08/05/2021    K 4.2 08/05/2021     08/05/2021    CO2 22 08/05/2021    BUN 15 08/05/2021    CREATININE 1.00 08/05/2021    GFRAA >60.0 08/05/2021    LABGLOM 55.6 08/05/2021    GLUCOSE 97 08/05/2021    GLUCOSE 94 12/10/2011    PROT 6.5 01/19/2019    CALCIUM 9.3 08/05/2021    BILITOT <0.2 01/19/2019    ALKPHOS 54 01/19/2019    AST 20 01/19/2019    ALT 33 01/19/2019       POC Tests: No results for input(s): POCGLU, POCNA, POCK, POCCL, POCBUN, POCHEMO, POCHCT in the last 72 hours.     Coags:   Lab Results   Component Value Date    PROTIME 13.4 12/18/2019    INR 1.0 12/18/2019    APTT 26.6 05/01/2015       HCG (If Applicable): No results found for: PREGTESTUR, PREGSERUM, HCG, HCGQUANT     ABGs: No results found for: PHART, PO2ART, UYO8MOK, PZF4YSG, BEART, X5KOZFZU     Type & Screen (If Applicable):  No results found for: LABABO, LABRH    Drug/Infectious Status (If Applicable):  No results found for: HIV, HEPCAB    COVID-19 Screening (If Applicable): No results found for: COVID19        Anesthesia Evaluation  Patient summary reviewed and Nursing notes reviewed  Airway: Mallampati: II  TM distance: >3 FB Neck ROM: full  Mouth opening: > = 3 FB Dental: normal exam         Pulmonary:                              Cardiovascular:    (+) hypertension:, hyperlipidemia      ECG reviewed               Beta Blocker:  Not on Beta Blocker         Neuro/Psych:   Negative Neuro/Psych ROS              GI/Hepatic/Renal:   (+) morbid obesity (class I obesity)          Endo/Other:    (+) hypothyroidism::., .    (-) hyperthyroidism               Abdominal:             Vascular: negative vascular ROS. Other Findings:             Anesthesia Plan      Sadaf block     ASA 3       Induction: intravenous. MIPS: Postoperative opioids intended and Prophylactic antiemetics administered. Anesthetic plan and risks discussed with patient. Plan discussed with CRNA.     Attending anesthesiologist reviewed and agrees with Katty Mi MD   8/10/2021

## 2021-08-10 NOTE — OP NOTE
Operative Note      Patient: Wicho Nelson  YOB: 1955  MRN: [id number]    Date of Procedure: 8/10/2021    Pre-Op Diagnosis: LEFT UPPER LIMB CARPAL TUNNEL SYNDROME    Post-Op Diagnosis: Same       Procedure(s):  CARPAL TUNNEL RELEASE/ REPAIR LEFT, PAT AT [facility]    Surgeon(s): Celestina Parkinson MD    Assistant:   Physician Assistant: Ady Mckeon PA-C    Anesthesia: [place] Block    Estimated Blood Loss (mL): Minimal    Complications: None    Specimens:   * No specimens in log *    Implants:  * No implants in log *      Drains: * No LDAs found *    Findings: Tight compression of the median nerve in the carpal canal    Detailed Description of Procedure: Indications for surgery    Mrs Lina Salazar is a 72-year-old female, who presents with features of tingling numbness and paresthesia in her left upper extremity. she has had EMG study which revealed evidence of carpal tunnel syndrome in the right hand. Patient had tried all conservative measures and failed. Surgical procedure was explained in detail, the risks and benefits of surgery were also discussed  Risk of anesthesia which is a Biers block, risk of injury to the vessel, nerve, tendon. Postoperative complications such as hematoma formation, infection, DVT, continued pain in the hand, neuroma formation, loss of sensations in the finger, wound healing problems, possible need for second surgery, recurrence of the symptoms, complications from her medical condition have all been discussed  Patient understands all the risks and benefits and consents for the surgical procedure    Operative note    Patient was brought to the operating room and a Biers block was given by the anesthetic services. The left upper extremity was prepped and draped chlorhexidine prep was used. The patient had 2 g of Ancef given prior to the start of the procedure. The timeout was called prior to the start of the procedure.   The procedure was done with magnifying loops. An incision was made in line with the ring finger close to the thenar crease. Skin and subcutaneous tissue were carefully cut and retracted. The incision measured approximately 2 cm. Careful dissection was done in the subcutaneous plane till the carpal ligament was reached. An incision was made into the carpal canal using a 15 blade through the carpal ligament. Care was taken to avoid any damage to the underlying neural and vascular structures. The incision measured about 2 to 3 mm only. Ralph Hung was now introduced into the carpal canal under the carpal ligament. Blunt tipped scissors was taken the decompression was done first proximally. There was significant thickness noticed in the carpal ligament. Care was taken to avoid any damage to the underlying neural and vascular and tendinous structures. After complete decompression had been performed proximally. Mary Anne Fridge was now passed proximally to make sure that the decompression had been complete. Mary Anne Fridge was now placed under the carpal ligament and the decompression was done distally. Care was taken once again to avoid any damage to the underlying neural and tendinous structures. The median nerve was noted to be mildly compressed  with evidence of hyperemia present along the area of the carpal canal.  The tendinous structures were moving freely within the carpal canal.  Surgical site was irrigated out completely. Skin was closed with interrupted nylon sutures. The skin was also infiltrated with quarter percent Marcaine. Approximately 8 to 10 cc of Marcaine was infiltrated into the skin. Surgical site was dressed with Xeroform, gauze, web roll and an Ace wrap. Patient made a satisfactory recovery. Postoperative instructions will be provided. She will be evaluated in orthopedic office in approximately 10 days. The role of the assistant was involved in prepping of the right upper extremity.   During the procedure he was involved in retraction of

## 2021-09-04 PROBLEM — Z01.818 PRE-OP EXAMINATION: Status: RESOLVED | Noted: 2021-08-05 | Resolved: 2021-09-04

## 2021-09-10 ENCOUNTER — OFFICE VISIT (OUTPATIENT)
Dept: ENDOCRINOLOGY | Age: 66
End: 2021-09-10
Payer: MEDICARE

## 2021-09-10 VITALS
OXYGEN SATURATION: 96 % | DIASTOLIC BLOOD PRESSURE: 81 MMHG | SYSTOLIC BLOOD PRESSURE: 119 MMHG | HEIGHT: 68 IN | WEIGHT: 213 LBS | BODY MASS INDEX: 32.28 KG/M2 | HEART RATE: 90 BPM

## 2021-09-10 DIAGNOSIS — E03.9 HYPOTHYROIDISM, UNSPECIFIED TYPE: Primary | ICD-10-CM

## 2021-09-10 DIAGNOSIS — E06.3 HASHIMOTO'S THYROIDITIS: ICD-10-CM

## 2021-09-10 PROCEDURE — 4040F PNEUMOC VAC/ADMIN/RCVD: CPT | Performed by: INTERNAL MEDICINE

## 2021-09-10 PROCEDURE — 1123F ACP DISCUSS/DSCN MKR DOCD: CPT | Performed by: INTERNAL MEDICINE

## 2021-09-10 PROCEDURE — G8417 CALC BMI ABV UP PARAM F/U: HCPCS | Performed by: INTERNAL MEDICINE

## 2021-09-10 PROCEDURE — 99213 OFFICE O/P EST LOW 20 MIN: CPT | Performed by: INTERNAL MEDICINE

## 2021-09-10 PROCEDURE — 3017F COLORECTAL CA SCREEN DOC REV: CPT | Performed by: INTERNAL MEDICINE

## 2021-09-10 PROCEDURE — G8427 DOCREV CUR MEDS BY ELIG CLIN: HCPCS | Performed by: INTERNAL MEDICINE

## 2021-09-10 PROCEDURE — G8399 PT W/DXA RESULTS DOCUMENT: HCPCS | Performed by: INTERNAL MEDICINE

## 2021-09-10 PROCEDURE — 1036F TOBACCO NON-USER: CPT | Performed by: INTERNAL MEDICINE

## 2021-09-10 PROCEDURE — 1090F PRES/ABSN URINE INCON ASSESS: CPT | Performed by: INTERNAL MEDICINE

## 2021-09-10 NOTE — PROGRESS NOTES
9/10/2021    Assessment:       Diagnosis Orders   1. Hypothyroidism, unspecified type  T4, Free    TSH with Reflex         PLAN:     Orders Placed This Encounter   Procedures    T4, Free     Standing Status:   Future     Standing Expiration Date:   9/10/2022    TSH with Reflex     Standing Status:   Future     Standing Expiration Date:   9/10/2022     Continue Synthroid 150 mcg daily follow-up with results    Subjective:     Chief Complaint   Patient presents with    Hypothyroidism     Vitals:    09/10/21 1031   BP: 119/81   Pulse: 90   SpO2: 96%   Weight: 213 lb (96.6 kg)   Height: 5' 7.5\" (1.715 m)     Wt Readings from Last 3 Encounters:   09/10/21 213 lb (96.6 kg)   08/10/21 217 lb (98.4 kg)   08/05/21 217 lb (98.4 kg)     BP Readings from Last 3 Encounters:   09/10/21 119/81   08/10/21 138/72   08/10/21 119/65     Follow-up on hypothyroidism lab review patient also thyroid ultrasound done showing smaller size of the gland   Lab work show Hashimoto thyroiditis    Other  This is a chronic problem. The current episode started more than 1 year ago. The problem occurs intermittently. The problem has been unchanged. Exacerbated by: Hashimoto thyroiditis. Treatments tried: Synthroid. The treatment provided moderate relief. Results for [facility] (MRN [id number]) as of 9/18/2021 19:52   Ref.  Range 9/3/2021 07:16   Sodium Latest Ref Range: 135 - 144 mEq/L 141   Potassium Latest Ref Range: 3.4 - 4.9 mEq/L 4.0   Chloride Latest Ref Range: 95 - 107 mEq/L 105   CO2 Latest Ref Range: 20 - 31 mEq/L 21   BUN Latest Ref Range: 8 - 23 mg/dL 14   Creatinine Latest Ref Range: 0.50 - 0.90 mg/dL 0.75   Anion Gap Latest Ref Range: 9 - 15 mEq/L 15   GFR Non- Latest Ref Range: >60  >60.0   GFR African American Latest Ref Range: >60  >60.0   Glucose Latest Ref Range: 70 - 99 mg/dL 101 (H)   Calcium Latest Ref Range: 8.5 - 9.9 mg/dL 9.3   TSH Latest Ref Range: 0.440 - 3.860 uIU/mL 0.747   THYROID PEROXIDASE (TPO) ABS Latest Ref Range: 0.0 - 25.0 IU/mL 419.0 (H)   T4 Free Latest Ref Range: 0.84 - 1.68 ng/dL 1.51         Narrative   THYROID ULTRASOUND       CLINICAL HISTORY: Hypothyroidism.       COMPARISON: Thyroid ultrasound 5/8/2017 and 9/1/2009       TECHNIQUE: Sonography and Doppler imaging of the thyroid was performed.  Images were obtained and stored in a permanent archive.       RESULT:    RIGHT LOBE:         Size: 2.8 x 1.2 x 1.9 cm          Echotexture: Heterogeneous        Nodules: No macronodules.       LEFT LOBE:       Size: 3.0 x 1.0 x 1.8 cm        Echotexture: Heterogeneous       Nodules: In the superior pole there is a solid hyperechoic nodule, measuring 0.5 x 0.4 cm, previously 0.6 x 0.5 x 0.4 cm.       TI-RADS Category: TI-RADS 3      ACR Recommendation: TI-RADS 3 nodule.  No FNA or further imaging is advised.            ISTHMUS:          AP diameter:  3 mm        Nodules: There is a hypoechoic nodule without internal flow, measuring 1.0 x 0.5 x 0.3 cm.        TI-RADS Category: TI-RADS 3        ACR Recommendation: TI-RADS 3 nodule.  No FNA or further imaging is advised.        =============       Impression       The thyroid gland is heterogeneous with subcentimeter nodules in the isthmus and left thyroid lobe, both nodules are TI-RADS 3 and require no further workup.          Past Medical History:   Diagnosis Date    Arthritis     both knees    Hyperlipidemia     meds > 10 yrs    Hypertension     meds > 4 yrs    Hyperthyroidism     Hypothyroidism     meds > 20 yrs    Palpitations 7/30/2021    Thyroid goiter      Past Surgical History:   Procedure Laterality Date    APPENDECTOMY  1981    with left oophorectomy    BREAST BIOPSY Left 2012    x 2 / both benign    CARPAL TUNNEL RELEASE Right 7/13/2021    CARPAL TUNNEL RELEASE/REPAIR RIGHT performed by Raymon Trinidad MD at [facility] Right 07/2021    CARPAL TUNNEL RELEASE Left 8/10/2021    CARPAL TUNNEL RELEASE/ REPAIR LEFT, PAT AT [facility] performed by Isra Mallory MD at [address]      COLONOSCOPY  07    COLONOSCOPY  [id number]    Ricci Crareon (polyps)    COLONOSCOPY  16    w/polypectomy     COLONOSCOPY N/A 2019    COLORECTAL CANCER SCREENING, HIGH RISK performed by Moshe Solorzano MD at [place] [address]. Left 2020    FLEXIBLE CYSTOSCOPY LEFT DOUBLE J [address] (RECENT SURGERY 19) KUB ON ARRIVAL performed by Isabel Lofton MD at [address] Josette [address] / Garth Phenes Left 2019    CYSTOSCOPY LEFT RETROGRADE PYELOGRAM  LEFT DOUBLE J STENT performed by Tessa Billings MD at [address] [facility] [address], DIAGNOSTIC     [address] REPLACEMENT  2019    LTKR    KNEE ARTHROSCOPY Bilateral 1987 2003    left X2 and Right X1    LITHOTRIPSY Left 2019    LEFT ESWL performed by Tessa Billings MD at [address] [facility]] Left 2019    LEFT TOTAL KNEE REPLACEMENT- [address] performed by Karma Colorado MD at [facility] Marital status:      Spouse name: Not on file    Number of children: Not on file    Years of education: Not on file    Highest education level: Not on file   Occupational History    Not on file   Tobacco Use    Smoking status: Former Smoker     Packs/day: 0.25     Years: 5.00     Pack years: 1.25     Types: Cigarettes     Quit date: 1981     Years since quittin.7    Smokeless tobacco: Never Used   Vaping Use    Vaping Use: Never used   Substance and Sexual Activity    Alcohol use: Yes     Comment: occ.     Drug use: No     Comment: CBD oil to left knee    Sexual activity: Not on file   Other Topics Concern    Not on file   Social History Narrative    Not on file     Social Determinants of Health     Financial Resource Strain: Low Risk     Difficulty of Paying Living rate.   Pulmonary:      Effort: Pulmonary effort is normal.   Musculoskeletal:         General: Normal range of motion. Cervical back: Normal range of motion and neck supple. Neurological:      General: No focal deficit present. Mental Status: She is alert and oriented to person, place, and time.    Psychiatric:         Mood and Affect: Mood normal.         Behavior: Behavior normal.

## 2021-10-08 ENCOUNTER — OFFICE VISIT (OUTPATIENT)
Dept: FAMILY MEDICINE CLINIC | Age: 66
End: 2021-10-08
Payer: MEDICARE

## 2021-10-08 VITALS
BODY MASS INDEX: 33.04 KG/M2 | SYSTOLIC BLOOD PRESSURE: 130 MMHG | OXYGEN SATURATION: 96 % | DIASTOLIC BLOOD PRESSURE: 68 MMHG | HEIGHT: 68 IN | HEART RATE: 85 BPM | WEIGHT: 218 LBS | RESPIRATION RATE: 16 BRPM

## 2021-10-08 DIAGNOSIS — I10 ESSENTIAL HYPERTENSION: Primary | ICD-10-CM

## 2021-10-08 DIAGNOSIS — E03.9 HYPOTHYROIDISM, UNSPECIFIED TYPE: ICD-10-CM

## 2021-10-08 DIAGNOSIS — E78.5 HYPERLIPIDEMIA, UNSPECIFIED HYPERLIPIDEMIA TYPE: ICD-10-CM

## 2021-10-08 DIAGNOSIS — R10.11 RIGHT UPPER QUADRANT ABDOMINAL PAIN: ICD-10-CM

## 2021-10-08 PROCEDURE — G8427 DOCREV CUR MEDS BY ELIG CLIN: HCPCS | Performed by: INTERNAL MEDICINE

## 2021-10-08 PROCEDURE — 3017F COLORECTAL CA SCREEN DOC REV: CPT | Performed by: INTERNAL MEDICINE

## 2021-10-08 PROCEDURE — G8484 FLU IMMUNIZE NO ADMIN: HCPCS | Performed by: INTERNAL MEDICINE

## 2021-10-08 PROCEDURE — 90694 VACC AIIV4 NO PRSRV 0.5ML IM: CPT | Performed by: INTERNAL MEDICINE

## 2021-10-08 PROCEDURE — 90732 PPSV23 VACC 2 YRS+ SUBQ/IM: CPT | Performed by: INTERNAL MEDICINE

## 2021-10-08 PROCEDURE — 1123F ACP DISCUSS/DSCN MKR DOCD: CPT | Performed by: INTERNAL MEDICINE

## 2021-10-08 PROCEDURE — 4040F PNEUMOC VAC/ADMIN/RCVD: CPT | Performed by: INTERNAL MEDICINE

## 2021-10-08 PROCEDURE — G8417 CALC BMI ABV UP PARAM F/U: HCPCS | Performed by: INTERNAL MEDICINE

## 2021-10-08 PROCEDURE — G0009 ADMIN PNEUMOCOCCAL VACCINE: HCPCS | Performed by: INTERNAL MEDICINE

## 2021-10-08 PROCEDURE — 99214 OFFICE O/P EST MOD 30 MIN: CPT | Performed by: INTERNAL MEDICINE

## 2021-10-08 PROCEDURE — 1090F PRES/ABSN URINE INCON ASSESS: CPT | Performed by: INTERNAL MEDICINE

## 2021-10-08 PROCEDURE — 1036F TOBACCO NON-USER: CPT | Performed by: INTERNAL MEDICINE

## 2021-10-08 PROCEDURE — G8399 PT W/DXA RESULTS DOCUMENT: HCPCS | Performed by: INTERNAL MEDICINE

## 2021-10-08 PROCEDURE — G0008 ADMIN INFLUENZA VIRUS VAC: HCPCS | Performed by: INTERNAL MEDICINE

## 2021-10-08 ASSESSMENT — ENCOUNTER SYMPTOMS
DIARRHEA: 0
ABDOMINAL PAIN: 0
CONSTIPATION: 0
SINUS PAIN: 0
VOICE CHANGE: 0
NAUSEA: 0
TROUBLE SWALLOWING: 0
COLOR CHANGE: 0
VOMITING: 0
ABDOMINAL DISTENTION: 0
EYE DISCHARGE: 0
COUGH: 0
BACK PAIN: 0
FACIAL SWELLING: 0
WHEEZING: 0
SINUS PRESSURE: 0
EYE ITCHING: 0
BLOOD IN STOOL: 0
CHEST TIGHTNESS: 0
EYE REDNESS: 0
APNEA: 0
SHORTNESS OF BREATH: 0
SORE THROAT: 0
RECTAL PAIN: 0
PHOTOPHOBIA: 0
RHINORRHEA: 0
EYE PAIN: 0

## 2021-10-08 NOTE — PROGRESS NOTES
10/8/2021    Enoc Lagos (:  1955) is a 73 y.o. female, here for evaluation of the following medical concerns:    HPI  61-year-old female with a history of essential hypertension, hypothyroidism and hyperlipidemia for follow-up visit. Right upper quadrant pain: Patient has been experiencing intermittent achy nonradiating right upper quadrant discomfort. She is intermittently experienced nausea with this symptom. Dyspnea:several weeks with associated tacchycardia, no additional cardiac symptoms. Hyperglycemia: Hemoglobin A1c was obtained on 2020. It was 5.8. It is 5.9 today. Hypertension:compliant with Norvasc. Menopausal symptoms: The patient symptoms are well controlled via estradiol which she takes        Hyperlipidemia: In regards to her hyperlipidemia the patient is compliant with pravastatin 40 mg orally daily        Hypothyroidism: In regards to her hypothyroidism the patient is compliant with Synthroid 150 mcg daily              Review of Systems   Constitutional: Negative for chills, diaphoresis, fatigue and fever. HENT: Negative for congestion, dental problem, drooling, ear discharge, ear pain, facial swelling, hearing loss, mouth sores, nosebleeds, postnasal drip, rhinorrhea, sinus pressure, sinus pain, sneezing, sore throat, tinnitus, trouble swallowing and voice change. Eyes: Negative for photophobia, pain, discharge, redness, itching and visual disturbance. Respiratory: Negative for apnea, cough, chest tightness, shortness of breath and wheezing. Cardiovascular: Negative for chest pain, palpitations and leg swelling. Gastrointestinal: Negative for abdominal distention, abdominal pain, blood in stool, constipation, diarrhea, nausea, rectal pain and vomiting. Endocrine: Negative for cold intolerance, heat intolerance, polydipsia, polyphagia and polyuria.    Genitourinary: Negative for decreased urine volume, difficulty RELEASE Left 8/10/2021    CARPAL TUNNEL RELEASE/ REPAIR LEFT, PAT AT [place] performed by Hannah Ward MD at [address]      COLONOSCOPY  07    COLONOSCOPY  [id number]    Rolanda Carreon (polyps)    COLONOSCOPY  16    w/polypectomy     COLONOSCOPY N/A 2019    COLORECTAL CANCER SCREENING, HIGH RISK performed by Otf Sullivan MD at [place] [address]. Left 2020    FLEXIBLE CYSTOSCOPY LEFT DOUBLE J [address] (RECENT SURGERY 19) KUB ON ARRIVAL performed by Kenna Waldron MD at [facility] [address] [facility] [address] [address] / [address] Left 2019    CYSTOSCOPY LEFT RETROGRADE PYELOGRAM  LEFT DOUBLE J STENT performed by Jaz Gonzalez MD at [address] [facility] [address], DIAGNOSTIC     [address] REPLACEMENT  2019    LTKR    KNEE ARTHROSCOPY Bilateral 1987 2003    left X2 and Right X1    LITHOTRIPSY Left 2019    LEFT ESWL performed by Jaz Gonzalez MD at [address] Left 2019    LEFT TOTAL KNEE REPLACEMENT- [address] performed by Karen Downs MD at [address] Marital status:      Spouse name: Not on file    Number of children: Not on file    Years of education: Not on file    Highest education level: Not on file   Occupational History    Not on file   Tobacco Use    Smoking status: Former Smoker     Packs/day: 0.25     Years: 5.00     Pack years: 1.25     Types: Cigarettes     Quit date: 1981     Years since quittin.7    Smokeless tobacco: Never Used   Vaping Use    Vaping Use: Never used   Substance and Sexual Activity    Alcohol use: Yes     Comment: occ.     Drug use: No     Comment: CBD oil to left knee    Sexual activity: Not on file   Other Topics Concern    Not on file   Social History Narrative    Not on file     Social Determinants of Health     Financial Resource Strain: Low Risk     Difficulty of Paying Living Expenses: Not hard at all   Food Insecurity: No Food Insecurity    Worried About Running Out of Food in the Last Year: Never true    Ruba of Food in the Last Year: Never true   Transportation Needs: No Transportation Needs    Lack of Transportation (Medical): No    Lack of Transportation (Non-Medical): No   Physical Activity:     Days of Exercise per Week:     Minutes of Exercise per Session:    Stress:     Feeling of Stress :    Social Connections:     Frequency of Communication with Friends and Family:     Frequency of Social Gatherings with Friends and Family:     Attends Restoration Services:     Active Member of Clubs or Organizations:     Attends Club or Organization Meetings:     Marital Status:    Intimate Partner Violence:     Fear of Current or Ex-Partner:     Emotionally Abused:     Physically Abused:     Sexually Abused:         Family History   Problem Relation Age of Onset    Heart Disease Mother     Cancer Mother         RENAL CELL    COPD Mother     Heart Attack Mother     Diabetes Other     Breast Cancer Other     Colon Cancer Father     Macular Degen Father     Cancer Brother         prostate cancer    No Known Problems Son        There were no vitals filed for this visit. Estimated body mass index is 32.87 kg/m² as calculated from the following:    Height as of 9/10/21: 5' 7.5\" (1.715 m). Weight as of 9/10/21: 213 lb (96.6 kg). Physical Exam  Constitutional:       General: She is not in acute distress. Appearance: She is well-developed. HENT:      Head: Normocephalic. Right Ear: External ear normal.      Left Ear: External ear normal.   Eyes:      Conjunctiva/sclera: Conjunctivae normal.   Neck:      Vascular: No JVD. Trachea: No tracheal deviation. Cardiovascular:      Rate and Rhythm: Normal rate and regular rhythm. Heart sounds: Normal heart sounds.    Pulmonary:      Effort: Pulmonary effort is normal. No respiratory distress. Breath sounds: Normal breath sounds. No wheezing or rales. Chest:      Chest wall: No tenderness. Abdominal:      General: Bowel sounds are normal. There is no distension. Palpations: Abdomen is soft. There is no mass. Tenderness: There is no abdominal tenderness. There is no guarding or rebound. Musculoskeletal:         General: No tenderness or deformity. Cervical back: Neck supple. Lymphadenopathy:      Cervical: No cervical adenopathy. Skin:     General: Skin is warm and dry. Coloration: Skin is not pale. Findings: No erythema or rash. Neurological:      Mental Status: She is alert and oriented to person, place, and time. Motor: No abnormal muscle tone. Psychiatric:         Thought Content: Thought content normal.         Judgment: Judgment normal.         ASSESSMENT/PLAN:  Right upper quadrant pain: We will obtain a right upper quadrant ultrasound and LFTs. Dyspnea: referral to cardiology and order an echocardiogram.        Essential hypertension  Continue Norvasc 5 mg po dailydue to headaches. Hypothyroidism, unspecified type  Continue Synthroid 150 mcg daily      Hyperlipidemia, unspecified hyperlipidemia typelipid panel has been ordered by the patient's endocrinologist.  Will await the results of the study. Hyperglycemia  -Encouraged aerobic exercise daily. ds.      Class 1 obesity due to excess calories with serious comorbidity and body mass index (BMI)35.2 as stated per #1. Postmenopausal symptomscontinue estradiol. No follow-ups on file. An  electronic signature was used to authenticate this note.     --Peyton Vera MD on 10/8/2021 at 9:22 AM

## 2021-10-08 NOTE — PROGRESS NOTES
Vaccine Information Sheet, \"Influenza - Inactivated\"  given to Herminia Lanes, or parent/legal guardian of  Herminia Lanes and verbalized understanding. Patient responses:    Have you ever had a reaction to a flu vaccine? No  Are you able to eat eggs without adverse effects? Yes  Do you have any current illness? No  Have you ever had Guillian Vance Syndrome? No    Flu vaccine given per order. Please see immunization tab.

## 2021-10-10 ENCOUNTER — VIRTUAL VISIT (OUTPATIENT)
Dept: FAMILY MEDICINE CLINIC | Age: 66
End: 2021-10-10
Payer: MEDICARE

## 2021-10-10 DIAGNOSIS — R53.83 FATIGUE AFTER VACCINATION: Primary | ICD-10-CM

## 2021-10-10 DIAGNOSIS — T50.Z95A FATIGUE AFTER VACCINATION: Primary | ICD-10-CM

## 2021-10-10 PROCEDURE — G8427 DOCREV CUR MEDS BY ELIG CLIN: HCPCS | Performed by: PHYSICIAN ASSISTANT

## 2021-10-10 PROCEDURE — 99212 OFFICE O/P EST SF 10 MIN: CPT | Performed by: PHYSICIAN ASSISTANT

## 2021-10-10 PROCEDURE — 1090F PRES/ABSN URINE INCON ASSESS: CPT | Performed by: PHYSICIAN ASSISTANT

## 2021-10-10 PROCEDURE — 4040F PNEUMOC VAC/ADMIN/RCVD: CPT | Performed by: PHYSICIAN ASSISTANT

## 2021-10-10 PROCEDURE — 3017F COLORECTAL CA SCREEN DOC REV: CPT | Performed by: PHYSICIAN ASSISTANT

## 2021-10-10 PROCEDURE — G8399 PT W/DXA RESULTS DOCUMENT: HCPCS | Performed by: PHYSICIAN ASSISTANT

## 2021-10-10 PROCEDURE — 1123F ACP DISCUSS/DSCN MKR DOCD: CPT | Performed by: PHYSICIAN ASSISTANT

## 2021-10-10 ASSESSMENT — ENCOUNTER SYMPTOMS
VOMITING: 0
ABDOMINAL PAIN: 0
SINUS PAIN: 0
NAUSEA: 0
SORE THROAT: 0
CHEST TIGHTNESS: 0
SHORTNESS OF BREATH: 0
BACK PAIN: 0
COUGH: 0
SINUS PRESSURE: 0
DIARRHEA: 0

## 2021-10-10 NOTE — PROGRESS NOTES
10/10/2021    TELEHEALTH EVALUATION -- Audio/Visual (During CENBG-86 public health emergency)    Due to COVID 19 outbreak, patient's office visit was converted to a virtual visit. Patient was contacted and agreed to proceed with a virtual visit via [employer] me  The risks and benefits of converting to a virtual visit were discussed in light of the current infectious disease epidemic. Patient also understood that insurance coverage and co-pays are up to their individual insurance plans. HPI:    Wilber Tejeda (:  1955) has requested an audio/video evaluation for the following concern(s):    Yesterday felt tired and fever with no appetite. Fever- Didn't taking temp. Taking tylenol. No appetite but keeping fluids down. No vomiting. Having body ache. Patient had two immunizations yesterday. Flu and Pneumonia. Still eating and drinking, no nausea, vomiting, or diarrhea. Review of Systems   Constitutional: Positive for fatigue and fever. Negative for activity change, appetite change and chills. HENT: Negative for congestion, drooling, sinus pressure, sinus pain and sore throat. Eyes: Negative for visual disturbance. Respiratory: Negative for cough, chest tightness and shortness of breath. Cardiovascular: Negative for chest pain. Gastrointestinal: Negative for abdominal pain, diarrhea, nausea and vomiting. Endocrine: Negative for cold intolerance. Genitourinary: Negative for dysuria, flank pain, frequency and hematuria. Musculoskeletal: Positive for myalgias. Negative for arthralgias and back pain. Skin: Negative for rash. Allergic/Immunologic: Negative for food allergies. Neurological: Negative for weakness, light-headedness, numbness and headaches. Hematological: Does not bruise/bleed easily. Prior to Visit Medications    Medication Sig Taking?  Authorizing Provider   levothyroxine (SYNTHROID) 150 MCG tablet TAKE 1 TABLET BY MOUTH EVERY DAY Yes Mars De La O MD pravastatin (PRAVACHOL) 40 MG tablet TAKE 1 TABLET BY MOUTH EVERY DAY Yes Rome Yepez MD   amLODIPine (NORVASC) 5 MG tablet TAKE 1 TABLET BY MOUTH EVERY DAY Yes Peyton Vera MD   erythromycin [facility]) 5 MG/GM ophthalmic ointment  Yes Historical Provider, MD DODGE 0.05 MG/24HR Place 1 patch onto the skin Twice a Week Yes Gema Portillo, DO       Social History     Tobacco Use    Smoking status: Former Smoker     Packs/day: 0.25     Years: 5.00     Pack years: 1.25     Types: Cigarettes     Quit date:      Years since quittin.8    Smokeless tobacco: Never Used   Vaping Use    Vaping Use: Never used   Substance Use Topics    Alcohol use: Yes     Comment: occ.  Drug use: No     Comment: CBD oil to left knee            PHYSICAL EXAMINATION:  [ INSTRUCTIONS:  \"[x]\" Indicates a positive item  \"[]\" Indicates a negative item  -- DELETE ALL ITEMS NOT EXAMINED]  []Patient is A&O x3. The patient is able to speak in clear and complete sentences without dyspnea. Patient did not cough during our interaction. Patient did not sound congested. No wheezing or adventitious breath sounds. Thoughts, perception, and judgement are intact. Due to this being a TeleHealth encounter, evaluation of the following organ systems is limited: Vitals/Constitutional/EENT/Resp/CV/GI//MS/Neuro/Skin/Heme-Lymph-Imm. ASSESSMENT/PLAN:  1. Fatigue after vaccination  - Patient's symptoms consistent with immune response to vaccination. Recommend that the patient use the arm with the injection as much as possible. Drink plenty of fluids, use tylenol for the fevers, and rest. Discussed signs and symptoms which require immediate follow-up in ED/call to 911. Patient verbalized understanding. Return if symptoms worsen or fail to improve. An  electronic signature was used to authenticate this note.     --LARON Marrero on 10/10/2021 at 10:57 AM        Pursuant to the emergency declaration under the [facility] and the [employer] Act, [facility] waiver authority and the Coronavirus Preparedness and [employer] Act, this Virtual  Visit was conducted, with patient's consent, to reduce the patient's risk of exposure to COVID-19 and provide continuity of care for an established patient. Services were provided through a video synchronous discussion virtually to substitute for in-person clinic visit.

## 2021-10-22 ENCOUNTER — HOSPITAL ENCOUNTER (OUTPATIENT)
Dept: ULTRASOUND IMAGING | Age: 66
Discharge: HOME | End: 2021-10-24
Payer: MEDICARE

## 2021-10-22 DIAGNOSIS — R10.11 RIGHT UPPER QUADRANT ABDOMINAL PAIN: ICD-10-CM

## 2021-10-22 PROCEDURE — 76705 ECHO EXAM OF ABDOMEN: CPT

## 2021-10-25 ENCOUNTER — PATIENT MESSAGE (OUTPATIENT)
Dept: FAMILY MEDICINE CLINIC | Age: 66
End: 2021-10-25

## 2021-10-25 NOTE — TELEPHONE ENCOUNTER
From: Gold Child  To: Ravinder Aleman MD  Sent: 10/25/2021 11:05 AM EDT  Subject: Test Results Question    Please call me regarding my ultrasound results. As soon as you get the chance.

## 2021-10-26 ENCOUNTER — TELEPHONE (OUTPATIENT)
Dept: FAMILY MEDICINE CLINIC | Age: 66
End: 2021-10-26

## 2021-10-27 NOTE — TELEPHONE ENCOUNTER
Phoned the radiologist who confirmed that the patient demonstrates fatty liver disease on review of her ultrasound. No further abnormalities are noted. I have discussed the results of the study with Nimco Rendon.

## 2021-11-11 DIAGNOSIS — Z12.31 ENCOUNTER FOR SCREENING MAMMOGRAM FOR BREAST CANCER: Primary | ICD-10-CM

## 2021-11-11 RX ORDER — ESTRADIOL 0.05 MG/D
FILM, EXTENDED RELEASE TRANSDERMAL
COMMUNITY
End: 2021-11-12

## 2021-11-11 RX ORDER — LEVOTHYROXINE SODIUM 0.15 MG/1
TABLET ORAL
COMMUNITY
End: 2021-12-10

## 2021-11-11 RX ORDER — AMLODIPINE BESYLATE 5 MG/1
TABLET ORAL
COMMUNITY
End: 2021-12-10

## 2021-11-11 RX ORDER — PRAVASTATIN SODIUM 40 MG
TABLET ORAL
COMMUNITY
End: 2021-12-10

## 2021-11-11 NOTE — TELEPHONE ENCOUNTER
Pt calling requesting refill on shu dispense 90 day supply CHARLENE, [facility]  and LAURENT order.       Pt will stop at the office to  both orders  tomorrow 11-12-21

## 2021-11-12 RX ORDER — ESTRADIOL 0.05 MG/D
1 PATCH TRANSDERMAL
Qty: 24 PATCH | Refills: 3 | Status: SHIPPED | OUTPATIENT
Start: 2021-11-15 | End: 2021-11-12

## 2021-11-12 RX ORDER — ESTRADIOL 0.05 MG/D
1 PATCH TRANSDERMAL
Qty: 24 PATCH | Refills: 3 | Status: SHIPPED | OUTPATIENT
Start: 2021-11-15

## 2021-12-03 ENCOUNTER — OFFICE VISIT (OUTPATIENT)
Dept: FAMILY MEDICINE CLINIC | Age: 66
End: 2021-12-03
Payer: MEDICARE

## 2021-12-03 VITALS
OXYGEN SATURATION: 96 % | HEART RATE: 88 BPM | BODY MASS INDEX: 32.58 KG/M2 | SYSTOLIC BLOOD PRESSURE: 134 MMHG | DIASTOLIC BLOOD PRESSURE: 80 MMHG | HEIGHT: 68 IN | RESPIRATION RATE: 14 BRPM | WEIGHT: 215 LBS

## 2021-12-03 DIAGNOSIS — R10.11 RIGHT UPPER QUADRANT ABDOMINAL PAIN: ICD-10-CM

## 2021-12-03 DIAGNOSIS — I10 ESSENTIAL HYPERTENSION: Primary | ICD-10-CM

## 2021-12-03 DIAGNOSIS — E03.9 HYPOTHYROIDISM, UNSPECIFIED TYPE: ICD-10-CM

## 2021-12-03 DIAGNOSIS — E78.5 HYPERLIPIDEMIA, UNSPECIFIED HYPERLIPIDEMIA TYPE: ICD-10-CM

## 2021-12-03 PROCEDURE — 99214 OFFICE O/P EST MOD 30 MIN: CPT | Performed by: INTERNAL MEDICINE

## 2021-12-03 PROCEDURE — 1036F TOBACCO NON-USER: CPT | Performed by: INTERNAL MEDICINE

## 2021-12-03 PROCEDURE — G8427 DOCREV CUR MEDS BY ELIG CLIN: HCPCS | Performed by: INTERNAL MEDICINE

## 2021-12-03 PROCEDURE — 3017F COLORECTAL CA SCREEN DOC REV: CPT | Performed by: INTERNAL MEDICINE

## 2021-12-03 PROCEDURE — G8484 FLU IMMUNIZE NO ADMIN: HCPCS | Performed by: INTERNAL MEDICINE

## 2021-12-03 PROCEDURE — 1123F ACP DISCUSS/DSCN MKR DOCD: CPT | Performed by: INTERNAL MEDICINE

## 2021-12-03 PROCEDURE — 4040F PNEUMOC VAC/ADMIN/RCVD: CPT | Performed by: INTERNAL MEDICINE

## 2021-12-03 PROCEDURE — 1090F PRES/ABSN URINE INCON ASSESS: CPT | Performed by: INTERNAL MEDICINE

## 2021-12-03 PROCEDURE — G8399 PT W/DXA RESULTS DOCUMENT: HCPCS | Performed by: INTERNAL MEDICINE

## 2021-12-03 PROCEDURE — G8417 CALC BMI ABV UP PARAM F/U: HCPCS | Performed by: INTERNAL MEDICINE

## 2021-12-03 ASSESSMENT — ENCOUNTER SYMPTOMS
FACIAL SWELLING: 0
COLOR CHANGE: 0
ABDOMINAL PAIN: 0
EYE DISCHARGE: 0
EYE REDNESS: 0
TROUBLE SWALLOWING: 0
CHEST TIGHTNESS: 0
NAUSEA: 0
PHOTOPHOBIA: 0
RHINORRHEA: 0
EYE ITCHING: 0
SORE THROAT: 0
SINUS PAIN: 0
ABDOMINAL DISTENTION: 0
DIARRHEA: 0
APNEA: 0
BACK PAIN: 0
SINUS PRESSURE: 0
SHORTNESS OF BREATH: 0
WHEEZING: 0
BLOOD IN STOOL: 0
COUGH: 0
VOICE CHANGE: 0
RECTAL PAIN: 0
CONSTIPATION: 0
VOMITING: 0
EYE PAIN: 0

## 2021-12-03 NOTE — PROGRESS NOTES
12/3/2021    Jose Martin Ovalle (:  1955) is a 73 y.o. female, here for evaluation of the following medical concerns:    Hypertension  Pertinent negatives include no chest pain, headaches, neck pain, palpitations or shortness of breath. 67-year-old female with a history of essential hypertension, hypothyroidism and hyperlipidemia for follow-up visit. Right upper quadrant pain: Well-controlled at this time. Dyspnea: Stable at this time. Hyperglycemia: Hemoglobin A1c was obtained on 2020. It was 5.8. It is 5.9 today. Hypertension:compliant with Norvasc. Menopausal symptoms: The patient symptoms are well controlled via estradiol which she takes        Hyperlipidemia: In regards to her hyperlipidemia the patient is compliant with pravastatin 40 mg orally daily        Hypothyroidism: In regards to her hypothyroidism the patient is compliant with Synthroid 150 mcg daily      Obesity: The patient's body mass index is presently 33. 18. Review of Systems   Constitutional: Negative for chills, diaphoresis, fatigue and fever. HENT: Negative for congestion, dental problem, drooling, ear discharge, ear pain, facial swelling, hearing loss, mouth sores, nosebleeds, postnasal drip, rhinorrhea, sinus pressure, sinus pain, sneezing, sore throat, tinnitus, trouble swallowing and voice change. Eyes: Negative for photophobia, pain, discharge, redness, itching and visual disturbance. Respiratory: Negative for apnea, cough, chest tightness, shortness of breath and wheezing. Cardiovascular: Negative for chest pain, palpitations and leg swelling. Gastrointestinal: Negative for abdominal distention, abdominal pain, blood in stool, constipation, diarrhea, nausea, rectal pain and vomiting. Endocrine: Negative for cold intolerance, heat intolerance, polydipsia, polyphagia and polyuria.    Genitourinary: Negative for decreased urine volume, difficulty urinating, dysuria, flank pain, frequency, genital sores, hematuria and urgency. Musculoskeletal: Negative for arthralgias, back pain, gait problem, joint swelling, myalgias, neck pain and neck stiffness. Skin: Negative for color change, rash and wound. Allergic/Immunologic: Negative for environmental allergies and food allergies. Neurological: Negative for dizziness, tremors, seizures, syncope, facial asymmetry, speech difficulty, weakness, light-headedness, numbness and headaches. Hematological: Negative for adenopathy. Does not bruise/bleed easily. Psychiatric/Behavioral: Negative for agitation, confusion, decreased concentration, hallucinations, self-injury, sleep disturbance and suicidal ideas. The patient is not nervous/anxious. Prior to Visit Medications    Medication Sig Taking? Authorizing Provider   DAR 0.05 MG/24HR Place 1 patch onto the skin Twice a Week  Alexis Figueroa,    amLODIPine (NORVASC) 5 MG tablet   Historical Provider, MD   levothyroxine (SYNTHROID) 150 MCG tablet   Historical Provider, MD   pravastatin (PRAVACHOL) 40 MG tablet   Historical Provider, MD   erythromycin (ROMYCIN) 5 MG/GM ophthalmic ointment   Historical Provider, MD        Allergies   Allergen Reactions    Antiseptic Products, Misc.  Rash     duraprep       Past Medical History:   Diagnosis Date    Arthritis     both knees    Hyperlipidemia     meds > 10 yrs    Hypertension     meds > 4 yrs    Hyperthyroidism     Hypothyroidism     meds > 20 yrs    Palpitations 7/30/2021    Thyroid goiter        Past Surgical History:   Procedure Laterality Date    APPENDECTOMY  1981    with left oophorectomy    BREAST BIOPSY Left 2012    x 2 / both benign    CARPAL TUNNEL RELEASE Right 7/13/2021    CARPAL TUNNEL RELEASE/REPAIR RIGHT performed by Hannah Ward MD at [facility],  [address] Right 07/2021    CARPAL TUNNEL RELEASE Left 8/10/2021    CARPAL TUNNEL RELEASE/ REPAIR LEFT, PAT AT [facility] performed by Harvey Santoro MD at [address]      COLONOSCOPY  07    COLONOSCOPY  2005    Benjamin Carreon (polyps)    COLONOSCOPY  16    w/polypectomy     COLONOSCOPY N/A 2019    COLORECTAL CANCER SCREENING, HIGH RISK performed by Donice Bence, MD at [place] [address]. Left 2020    FLEXIBLE CYSTOSCOPY LEFT DOUBLE J [address] (RECENT SURGERY 19) KUB ON ARRIVAL performed by La Egan MD at [address] Jenny[facility] Baldwin Left 2019    CYSTOSCOPY LEFT RETROGRADE PYELOGRAM  LEFT DOUBLE J STENT performed by Emeterio Lang MD at [address] [facility] [address], DIAGNOSTIC     [address] REPLACEMENT  2019    LTKR    KNEE ARTHROSCOPY Bilateral 1987    left X2 and Right X1    LITHOTRIPSY Left 2019    LEFT ESWL performed by Emeterio Lang MD at [facility] Left 2019    LEFT TOTAL KNEE REPLACEMENT- [address] performed by Mark Marion MD at [address] Marital status:      Spouse name: Not on file    Number of children: Not on file    Years of education: Not on file    Highest education level: Not on file   Occupational History    Not on file   Tobacco Use    Smoking status: Former Smoker     Packs/day: 0.25     Years: 5.00     Pack years: 1.25     Types: Cigarettes     Quit date: 1981     Years since quittin.9    Smokeless tobacco: Never Used   Vaping Use    Vaping Use: Never used   Substance and Sexual Activity    Alcohol use: Yes     Comment: occ.     Drug use: No     Comment: CBD oil to left knee    Sexual activity: Not on file   Other Topics Concern    Not on file   Social History Narrative    Not on file     Social Determinants of Health     Financial Resource Strain: Low Risk     Difficulty of Paying Living Expenses: Not hard at all   Food Insecurity: No Food Insecurity    Worried About Running Out of Food in the Last Year: Never true    Ran Out of Food in the Last Year: Never true   Transportation Needs: No Transportation Needs    Lack of Transportation (Medical): No    Lack of Transportation (Non-Medical): No   Physical Activity:     Days of Exercise per Week: Not on file    Minutes of Exercise per Session: Not on file   Stress:     Feeling of Stress : Not on file   Social Connections:     Frequency of Communication with Friends and Family: Not on file    Frequency of Social Gatherings with Friends and Family: Not on file    Attends Restorationist Services: Not on file    Active Member of [employer] or Organizations: Not on file    Attends Club or Organization Meetings: Not on file    Marital Status: Not on file   Intimate Partner Violence:     Fear of Current or Ex-Partner: Not on file    Emotionally Abused: Not on file    Physically Abused: Not on file    Sexually Abused: Not on file   Housing Stability:     Unable to Pay for Housing in the Last Year: Not on file    Number of [place] in the Last Year: Not on file    Unstable Housing in the Last Year: Not on file        Family History   Problem Relation Age of Onset    Heart Disease Mother     Cancer Mother         RENAL CELL    COPD Mother     Heart Attack Mother     Diabetes Other     Breast Cancer Other     Colon Cancer Father     Macular Degen Father     Cancer Brother         prostate cancer    No Known Problems Son        Vitals:    12/03/21 0946   BP: 134/80   Pulse: 88   Resp: 14   SpO2: 96%   Weight: 215 lb (97.5 kg)   Height: 5' 7.5\" (1.715 m)     Estimated body mass index is 33.18 kg/m² as calculated from the following:    Height as of this encounter: 5' 7.5\" (1.715 m). Weight as of this encounter: 215 lb (97.5 kg). Physical Exam  Constitutional:       General: She is not in acute distress. Appearance: She is well-developed.    HENT:      Head: Normocephalic. Right Ear: External ear normal.      Left Ear: External ear normal.   Eyes:      Conjunctiva/sclera: Conjunctivae normal.   Neck:      Vascular: No JVD. Trachea: No tracheal deviation. Cardiovascular:      Rate and Rhythm: Normal rate and regular rhythm. Heart sounds: Normal heart sounds. Pulmonary:      Effort: Pulmonary effort is normal. No respiratory distress. Breath sounds: Normal breath sounds. No wheezing or rales. Chest:      Chest wall: No tenderness. Abdominal:      General: Bowel sounds are normal. There is no distension. Palpations: Abdomen is soft. There is no mass. Tenderness: There is no abdominal tenderness. There is no guarding or rebound. Musculoskeletal:         General: No tenderness or deformity. Cervical back: Neck supple. Lymphadenopathy:      Cervical: No cervical adenopathy. Skin:     General: Skin is warm and dry. Coloration: Skin is not pale. Findings: No erythema or rash. Neurological:      Mental Status: She is alert and oriented to person, place, and time. Motor: No abnormal muscle tone. Psychiatric:         Thought Content: Thought content normal.         Judgment: Judgment normal.         ASSESSMENT/PLAN:  Right upper quadrant pain: Resolved. Monitoring. Dyspnea: Resolved. Essential hypertension  Continue Norvasc 5 mg po dailydue to headaches. Hypothyroidism, unspecified type  Continue Synthroid 150 mcg daily      Hyperlipidemia, unspecified hyperlipidemia typecontinue pravastatin      Hyperglycemia  -Encouraged aerobic exercise daily. ds.      Class 1 obesity due to excess calories with serious comorbidity and body mass index (BMI)= 33.18 as stated per #1. Postmenopausal symptomscontinue estradiol. No follow-ups on file. An  electronic signature was used to authenticate this note.     --Walker Dorantes MD on 12/5/2021 at 8:50 PM

## 2021-12-06 ENCOUNTER — HOSPITAL ENCOUNTER (OUTPATIENT)
Dept: WOMENS IMAGING | Age: 66
Discharge: HOME | End: 2021-12-08
Payer: MEDICARE

## 2021-12-06 DIAGNOSIS — Z12.31 ENCOUNTER FOR SCREENING MAMMOGRAM FOR BREAST CANCER: ICD-10-CM

## 2021-12-06 PROCEDURE — 77063 BREAST TOMOSYNTHESIS BI: CPT

## 2021-12-10 RX ORDER — AMLODIPINE BESYLATE 5 MG/1
TABLET ORAL
Qty: 90 TABLET | Refills: 1 | Status: SHIPPED | OUTPATIENT
Start: 2021-12-10 | End: 2022-06-15

## 2021-12-10 RX ORDER — PRAVASTATIN SODIUM 40 MG
TABLET ORAL
Qty: 90 TABLET | Refills: 1 | Status: SHIPPED | OUTPATIENT
Start: 2021-12-10 | End: 2022-06-15

## 2021-12-10 RX ORDER — LEVOTHYROXINE SODIUM 0.15 MG/1
TABLET ORAL
Qty: 90 TABLET | Refills: 1 | Status: SHIPPED | OUTPATIENT
Start: 2021-12-10 | End: 2022-07-14

## 2021-12-10 NOTE — TELEPHONE ENCOUNTER
Future Appointments    Encounter Information    Provider Department Appt Notes   1/13/2022 Malia Sierra, [address] Obstetrics and Gynecology annual   4/8/2022 Arnold Trinidad MD [facility] 108 6 month follow up   5/2/2022 Ulises Murrieta, [facility] 5 month f/u   7/28/2022 Flori Montoya MD [facility] Urology 1 Year KUB       Recent Visits    12/03/2021 Essential hypertension   [facility] AT [facility] Ulises Murrieta MD

## 2022-01-13 ENCOUNTER — OFFICE VISIT (OUTPATIENT)
Dept: OBGYN CLINIC | Age: 67
End: 2022-01-13
Payer: MEDICARE

## 2022-01-13 VITALS
BODY MASS INDEX: 33.33 KG/M2 | DIASTOLIC BLOOD PRESSURE: 82 MMHG | SYSTOLIC BLOOD PRESSURE: 138 MMHG | HEART RATE: 95 BPM | WEIGHT: 216 LBS

## 2022-01-13 DIAGNOSIS — Z78.0 POSTMENOPAUSAL: ICD-10-CM

## 2022-01-13 DIAGNOSIS — Z12.31 ENCOUNTER FOR SCREENING MAMMOGRAM FOR BREAST CANCER: Primary | ICD-10-CM

## 2022-01-13 PROCEDURE — G8484 FLU IMMUNIZE NO ADMIN: HCPCS | Performed by: OBSTETRICS & GYNECOLOGY

## 2022-01-13 PROCEDURE — G0101 CA SCREEN;PELVIC/BREAST EXAM: HCPCS | Performed by: OBSTETRICS & GYNECOLOGY

## 2022-01-13 RX ORDER — CLOTRIMAZOLE AND BETAMETHASONE DIPROPIONATE 10; .64 MG/G; MG/G
CREAM TOPICAL
Qty: 30 G | Refills: 2 | Status: SHIPPED | OUTPATIENT
Start: 2022-01-13 | End: 2022-03-31

## 2022-01-13 ASSESSMENT — ENCOUNTER SYMPTOMS
ABDOMINAL PAIN: 0
SORE THROAT: 0
COUGH: 0
BLOOD IN STOOL: 0
ABDOMINAL DISTENTION: 0
VOICE CHANGE: 0
COLOR CHANGE: 0
NAUSEA: 0
TROUBLE SWALLOWING: 0
CONSTIPATION: 0
VOMITING: 0
WHEEZING: 0
BACK PAIN: 0
SHORTNESS OF BREATH: 0
CHEST TIGHTNESS: 0

## 2022-01-13 NOTE — PROGRESS NOTES
CHIEF COMPLAINT: Romulo Vivar is here for a well woman exam.  She is a 56-year-old menopausal female she had a hysterectomy. She is on estradiol 0.05 mg patches. She is doing well. Chief Complaint   Patient presents with    Annual Exam     Samule Files has been discontinued and patient is using the generic         HISTORY OF PRESENT ILLNESS:  73 y.o. female presents for her annual exam. She had no concernsor complaints today. Her menses is absent. She denies breakthrough bleeding, pelvic pain, or abnormal discharge. Bowel and bladder function is normal. Her health overallhas been good.      Past Medical History:   Diagnosis Date    Arthritis     both knees    Hyperlipidemia     meds > 10 yrs    Hypertension     meds > 4 yrs    Hyperthyroidism     Hypothyroidism     meds > 20 yrs    Palpitations 7/30/2021    Thyroid goiter      Past Surgical History:   Procedure Laterality Date    APPENDECTOMY  1981    with left oophorectomy    BREAST BIOPSY Left 2012    x 2 / both benign    CARPAL TUNNEL RELEASE Right 7/13/2021    CARPAL TUNNEL RELEASE/REPAIR RIGHT performed by Carmen Schumacher MD at [facility] Right 07/2021    CARPAL TUNNEL RELEASE Left 8/10/2021    CARPAL TUNNEL RELEASE/ REPAIR LEFT, PAT AT [facility] performed by Carmen Schumacher MD at [address] [place] [address]  2008    COLONOSCOPY  11-16-07    COLONOSCOPY  [id number]    Willis Carreon (polyps)    COLONOSCOPY  2/12/16    w/polypectomy     COLONOSCOPY N/A 2/18/2019    COLORECTAL CANCER SCREENING, HIGH RISK performed by Tino Mccoy MD at [place] [address]. Left 1/8/2020    FLEXIBLE CYSTOSCOPY LEFT DOUBLE J STENT REMOVAL (RECENT SURGERY 12/23/19) KUB ON ARRIVAL performed by Tesfaye Abernathy MD at [address] / [address] [facility] [address] / STONE Left 12/23/2019    CYSTOSCOPY LEFT RETROGRADE PYELOGRAM  LEFT DOUBLE J STENT performed by Kaur Tran MD at [address] [place] [address] [address], DIAGNOSTIC      HYSTERECTOMY  1998    JOINT REPLACEMENT  2019    LTKR    KNEE ARTHROSCOPY Bilateral 1987 2003    left X2 and Right X1    LITHOTRIPSY Left 2019    LEFT ESWL performed by Kaur Tran MD at [address] Left 2019    LEFT TOTAL KNEE REPLACEMENT- [address] performed by Sandra Reed MD at [address] History   Problem Relation Age of Onset    Heart Disease Mother     Cancer Mother         RENAL CELL    COPD Mother     Heart Attack Mother     Diabetes Other     Breast Cancer Other     Colon Cancer Father     Macular Degen Father     Cancer Brother         prostate cancer    No Known Problems Son      Social History     Socioeconomic History    Marital status:      Spouse name: Not on file    Number of children: Not on file    Years of education: Not on file    Highest education level: Not on file   Occupational History    Not on file   Tobacco Use    Smoking status: Former Smoker     Packs/day: 0.25     Years: 5.00     Pack years: 1.25     Types: Cigarettes     Quit date:      Years since quittin.0    Smokeless tobacco: Never Used   Vaping Use    Vaping Use: Never used   Substance and Sexual Activity    Alcohol use: Yes     Comment: occ.  Drug use: No     Comment: CBD oil to left knee    Sexual activity: Not on file   Other Topics Concern    Not on file   Social History Narrative    Not on file     Social Determinants of Health     Financial Resource Strain: Low Risk     Difficulty of Paying Living Expenses: Not hard at all   Food Insecurity: No Food Insecurity    Worried About Running Out of Food in the Last Year: Never true    Ruba of Food in the Last Year: Never true   Transportation Needs: No Transportation Needs    Lack of Transportation (Medical): No    Lack of Transportation (Non-Medical):  No   Physical Activity:     Days of Exercise per Week: Not on file    Minutes of Exercise per Session: Not on file   Stress:     Feeling of Stress : Not on file   Social Connections:     Frequency of Communication with Friends and Family: Not on file    Frequency of Social Gatherings with Friends and Family: Not on file    Attends Cheondoism Services: Not on file    Active Member of Clubs or Organizations: Not on file    Attends Club or Organization Meetings: Not on file    Marital Status: Not on file   Intimate Partner Violence:     Fear of Current or Ex-Partner: Not on file    Emotionally Abused: Not on file    Physically Abused: Not on file    Sexually Abused: Not on file   Housing Stability:     Unable to Pay for Housing in the Last Year: Not on file    Number of [place] in the Last Year: Not on file    Unstable Housing in the Last Year: Not on file     Allergies:  Antiseptic products, misc. Current Outpatient Medications on File Prior to Visit   Medication Sig Dispense Refill    amLODIPine (NORVASC) 5 MG tablet TAKE 1 TABLET BY MOUTH EVERY DAY 90 tablet 1    pravastatin (PRAVACHOL) 40 MG tablet TAKE 1 TABLET BY MOUTH EVERY DAY 90 tablet 1    levothyroxine (SYNTHROID) 150 MCG tablet TAKE 1 TABLET BY MOUTH EVERY DAY 90 tablet 1    DAR 0.05 MG/24HR Place 1 patch onto the skin Twice a Week 24 patch 3    erythromycin (ROMYCIN) 5 MG/GM ophthalmic ointment        No current facility-administered medications on file prior to visit. OB History        1    Para   1    Term   1            AB        Living           SAB        IAB        Ectopic        Molar        Multiple        Live Births                  Patient's medications, allergies, past medical, surgical,social and family histories were reviewed and updated as appropriate. Review of Systems   Constitutional: Negative for activity change, appetite change, fatigue and unexpected weight change.    HENT: Negative for dental problem, ear pain, hearing loss, nosebleeds, sore throat, trouble swallowing and voice change. Eyes: Negative for visual disturbance. Respiratory: Negative for cough, chest tightness, shortness of breath and wheezing. Cardiovascular: Negative for chest pain and palpitations. Gastrointestinal: Negative for abdominal distention, abdominal pain, blood in stool, constipation, nausea and vomiting. Endocrine: Negative for cold intolerance, heat intolerance, polydipsia, polyphagia and polyuria. Genitourinary: Negative for difficulty urinating, dyspareunia, dysuria, flank pain, frequency, genital sores, hematuria, menstrual problem, pelvic pain, urgency, vaginal bleeding, vaginal discharge and vaginal pain. Musculoskeletal: Negative for arthralgias, back pain, joint swelling and myalgias. Skin: Negative for color change and rash. Allergic/Immunologic: Negative for environmental allergies, food allergies and immunocompromised state. Neurological: Negative for dizziness, seizures, syncope, speech difficulty, weakness, numbness and headaches. Hematological: Negative for adenopathy. Does not bruise/bleed easily. Psychiatric/Behavioral: Negative for agitation, behavioral problems, confusion, decreased concentration, dysphoric mood and suicidal ideas. The patient is not nervous/anxious and is not hyperactive. All other systems reviewed and are negative. PHYSICAL EXAM  /82   Pulse 95   Wt 216 lb (98 kg)   LMP 09/27/1998 (Approximate)   BMI 33.33 kg/m²     Physical Exam  Vitals and nursing note reviewed. Exam conducted with a chaperone present. Constitutional:       Appearance: She is well-developed. HENT:      Head: Normocephalic and atraumatic. Eyes:      Pupils: Pupils are equal, round, and reactive to light. Neck:      Thyroid: No thyromegaly. Trachea: No tracheal deviation. Cardiovascular:      Rate and Rhythm: Normal rate and regular rhythm. Heart sounds: Normal heart sounds.    Pulmonary:      Effort: Pulmonary effort is normal.      Breath sounds: Normal breath sounds. Chest:      Chest wall: No tenderness. Abdominal:      General: Bowel sounds are normal. There is no distension. Palpations: Abdomen is soft. There is no mass. Tenderness: There is no abdominal tenderness. There is no guarding or rebound. Hernia: There is no hernia in the left inguinal area. Genitourinary:     Labia:         Right: No rash, tenderness, lesion or injury. Left: No rash, tenderness, lesion or injury. Vagina: Normal. No foreign body. No vaginal discharge, erythema, tenderness or bleeding. Adnexa:         Right: No mass, tenderness or fullness. Left: No mass, tenderness or fullness. Rectum: Normal. No mass, tenderness, anal fissure, external hemorrhoid or internal hemorrhoid. Normal anal tone. Musculoskeletal:         General: Normal range of motion. Cervical back: Normal range of motion. Lymphadenopathy:      Cervical: No cervical adenopathy. Neurological:      Mental Status: She is alert and oriented to person, place, and time. ASSESSMENT : Routine Annual Exam   menopause  Well woman exam    PLAN: Annual mammogram.  Continue the estradiol patches and follow-up here in 1 year or as needed  Pap smear  Not obtained  No orders of the defined types were placed in this encounter. No orders of the defined types were placed in this encounter. Repeat Annual every 1 year. New ASCCP guidelines regarding pap and Hi Risk HPV co-testing reviewed. Cervical Cytology Evaluation begins at 24years old. If Negative Cytology, Follow-upscreening per current  ASCCP guidelines. Mammograms every 1 year. If 37 yo and last mammogram was negative. Calcium and Vitamin D dosing reviewed. Colonoscopy screening guidelines reviewed as well as onset forbone density testing. Birth control and barrier methods and recommendations discussed. STD counseling and prevention reviewed.   Gardisil counseling completed for all patients 7-35 yo. Routine health maintenance per patients PCP.        Electronically signed by Harriett Hawk DO on 1/13/22

## 2022-02-21 ENCOUNTER — ANESTHESIA EVENT (OUTPATIENT)
Dept: ENDOSCOPY | Age: 67
End: 2022-02-21
Payer: MEDICARE

## 2022-02-21 ENCOUNTER — ANESTHESIA (OUTPATIENT)
Dept: ENDOSCOPY | Age: 67
End: 2022-02-21
Payer: MEDICARE

## 2022-02-21 ENCOUNTER — ANCILLARY PROCEDURE (OUTPATIENT)
Dept: ENDOSCOPY | Age: 67
End: 2022-02-21
Attending: INTERNAL MEDICINE
Payer: MEDICARE

## 2022-02-21 ENCOUNTER — HOSPITAL ENCOUNTER (OUTPATIENT)
Age: 67
Setting detail: OUTPATIENT SURGERY
Discharge: HOME | End: 2022-02-21
Attending: INTERNAL MEDICINE | Admitting: INTERNAL MEDICINE
Payer: MEDICARE

## 2022-02-21 VITALS
WEIGHT: 212 LBS | BODY MASS INDEX: 32.13 KG/M2 | DIASTOLIC BLOOD PRESSURE: 73 MMHG | RESPIRATION RATE: 16 BRPM | HEART RATE: 62 BPM | HEIGHT: 68 IN | OXYGEN SATURATION: 96 % | TEMPERATURE: 97.1 F | SYSTOLIC BLOOD PRESSURE: 117 MMHG

## 2022-02-21 VITALS
RESPIRATION RATE: 7 BRPM | DIASTOLIC BLOOD PRESSURE: 66 MMHG | SYSTOLIC BLOOD PRESSURE: 105 MMHG | OXYGEN SATURATION: 97 %

## 2022-02-21 PROCEDURE — 2580000003 HC RX 258

## 2022-02-21 PROCEDURE — 7100000011 HC PHASE II RECOVERY - ADDTL 15 MIN: Performed by: INTERNAL MEDICINE

## 2022-02-21 PROCEDURE — 2580000003 HC RX 258: Performed by: INTERNAL MEDICINE

## 2022-02-21 PROCEDURE — 88305 TISSUE EXAM BY PATHOLOGIST: CPT

## 2022-02-21 PROCEDURE — 45385 COLONOSCOPY W/LESION REMOVAL: CPT | Performed by: INTERNAL MEDICINE

## 2022-02-21 PROCEDURE — 7100000010 HC PHASE II RECOVERY - FIRST 15 MIN: Performed by: INTERNAL MEDICINE

## 2022-02-21 PROCEDURE — 6360000002 HC RX W HCPCS: Performed by: NURSE ANESTHETIST, CERTIFIED REGISTERED

## 2022-02-21 PROCEDURE — 2500000003 HC RX 250 WO HCPCS: Performed by: NURSE ANESTHETIST, CERTIFIED REGISTERED

## 2022-02-21 PROCEDURE — 3609027000 HC COLONOSCOPY: Performed by: INTERNAL MEDICINE

## 2022-02-21 PROCEDURE — 3700000001 HC ADD 15 MINUTES (ANESTHESIA): Performed by: INTERNAL MEDICINE

## 2022-02-21 PROCEDURE — 2709999900 HC NON-CHARGEABLE SUPPLY: Performed by: INTERNAL MEDICINE

## 2022-02-21 PROCEDURE — 3700000000 HC ANESTHESIA ATTENDED CARE: Performed by: INTERNAL MEDICINE

## 2022-02-21 PROCEDURE — 6370000000 HC RX 637 (ALT 250 FOR IP): Performed by: INTERNAL MEDICINE

## 2022-02-21 RX ORDER — SODIUM CHLORIDE 0.9 % (FLUSH) 0.9 %
5-40 SYRINGE (ML) INJECTION PRN
Status: DISCONTINUED | OUTPATIENT
Start: 2022-02-21 | End: 2022-02-21 | Stop reason: HOSPADM

## 2022-02-21 RX ORDER — LIDOCAINE HYDROCHLORIDE 20 MG/ML
INJECTION, SOLUTION INFILTRATION; PERINEURAL PRN
Status: DISCONTINUED | OUTPATIENT
Start: 2022-02-21 | End: 2022-02-21 | Stop reason: SDUPTHER

## 2022-02-21 RX ORDER — MAGNESIUM HYDROXIDE 1200 MG/15ML
LIQUID ORAL PRN
Status: DISCONTINUED | OUTPATIENT
Start: 2022-02-21 | End: 2022-02-21 | Stop reason: ALTCHOICE

## 2022-02-21 RX ORDER — 0.9 % SODIUM CHLORIDE 0.9 %
500 INTRAVENOUS SOLUTION INTRAVENOUS ONCE
Status: CANCELLED | OUTPATIENT
Start: 2022-02-21 | End: 2022-02-21

## 2022-02-21 RX ORDER — SODIUM CHLORIDE 0.9 % (FLUSH) 0.9 %
5-40 SYRINGE (ML) INJECTION EVERY 12 HOURS SCHEDULED
Status: DISCONTINUED | OUTPATIENT
Start: 2022-02-21 | End: 2022-02-21 | Stop reason: HOSPADM

## 2022-02-21 RX ORDER — PROPOFOL 10 MG/ML
INJECTION, EMULSION INTRAVENOUS PRN
Status: DISCONTINUED | OUTPATIENT
Start: 2022-02-21 | End: 2022-02-21 | Stop reason: SDUPTHER

## 2022-02-21 RX ORDER — SIMETHICONE 20 MG/.3ML
EMULSION ORAL PRN
Status: DISCONTINUED | OUTPATIENT
Start: 2022-02-21 | End: 2022-02-21 | Stop reason: ALTCHOICE

## 2022-02-21 RX ORDER — SODIUM CHLORIDE 9 MG/ML
25 INJECTION, SOLUTION INTRAVENOUS PRN
Status: DISCONTINUED | OUTPATIENT
Start: 2022-02-21 | End: 2022-02-21 | Stop reason: HOSPADM

## 2022-02-21 RX ORDER — SODIUM CHLORIDE 9 MG/ML
INJECTION, SOLUTION INTRAVENOUS
Status: COMPLETED
Start: 2022-02-21 | End: 2022-02-21

## 2022-02-21 RX ADMIN — PROPOFOL 200 MG: 10 INJECTION, EMULSION INTRAVENOUS at 08:29

## 2022-02-21 RX ADMIN — LIDOCAINE HYDROCHLORIDE 40 MG: 20 INJECTION, SOLUTION INFILTRATION; PERINEURAL at 08:16

## 2022-02-21 RX ADMIN — SODIUM CHLORIDE: 9 INJECTION, SOLUTION INTRAVENOUS at 08:14

## 2022-02-21 RX ADMIN — PROPOFOL 200 MG: 10 INJECTION, EMULSION INTRAVENOUS at 08:19

## 2022-02-21 RX ADMIN — PROPOFOL 100 MG: 10 INJECTION, EMULSION INTRAVENOUS at 08:17

## 2022-02-21 RX ADMIN — SODIUM CHLORIDE 500 ML: 9 INJECTION, SOLUTION INTRAVENOUS at 07:39

## 2022-02-21 ASSESSMENT — PULMONARY FUNCTION TESTS
PIF_VALUE: 0

## 2022-02-21 ASSESSMENT — PAIN - FUNCTIONAL ASSESSMENT: PAIN_FUNCTIONAL_ASSESSMENT: 0-10

## 2022-02-21 NOTE — H&P
Patient Name: Nathaly Salcido  : 1955  MRN: [id number]  DATE: 22      ENDOSCOPY  History and Physical    Procedure:    [] Diagnostic Colonoscopy       [x] Screening Colonoscopy  [] EGD      [] ERCP      [] EUS       [] Other    [x] Previous office notes/History and Physical reviewed from the patients chart. Please see EMR for further details of HPI. I have examined the patient's status immediately prior to the procedure and:      Indications/HPI:    []Abdominal Pain   []Cancer- GI/Lung  [x]Fhx of colon CA  []History of Polyps   []Alfords   []Melena  []Abnormal Imaging   []Dysphagia    []Persistent Pneumonia  []Anemia   []Food Impaction  [x]History of Polyps  []GI Bleed   []Pulmonary nodule/Mass  []Change in bowel habits  []Heartburn/Reflux  []Rectal Bleed (BRBPR)  []Chest Pain - Non Cardiac  []Heme (+) Stool  []Ulcers  []Constipation   []Hemoptysis   []Varices  []Diarrhea   []Hypoxemia  []Nausea/Vomiting   []Screening   []Crohns/Colitis  []Other:    Anesthesia:   [x] MAC [] Moderate Sedation   [] General   [] None     ROS: 12 pt Review of Symptoms was negative unless mentioned above    Medications:   Prior to Admission medications    Medication Sig Start Date End Date Taking? Authorizing Provider   amLODIPine (NORVASC) 5 MG tablet TAKE 1 TABLET BY MOUTH EVERY DAY 12/10/21  Yes Kyra Hernandez MD   pravastatin (PRAVACHOL) 40 MG tablet TAKE 1 TABLET BY MOUTH EVERY DAY 12/10/21  Yes Vazquez Brown MD   levothyroxine (SYNTHROID) 150 MCG tablet TAKE 1 TABLET BY MOUTH EVERY DAY 12/10/21  Yes Vazquez Brown MD   erythromycin (ROMYCIN) 5 MG/GM ophthalmic ointment  21  Yes Historical Provider, MD   clotrimazole-betamethasone (LOTRISONE) 1-0.05 % cream Apply topically 2 times daily. 22   Norberto Figueroa DO   DAR 0.05 MG/24HR Place 1 patch onto the skin Twice a Week 11/15/21   Marion Deluca DO     Allergies: Allergies   Allergen Reactions    Antiseptic Products, Misc.  Rash     duraprep History of allergic reaction to anesthesia:  No  Past Medical History:  Past Medical History:   Diagnosis Date    Arthritis     both knees    Hyperlipidemia     meds > 10 yrs    Hypertension     meds > 4 yrs    Hyperthyroidism     Hypothyroidism     meds > 20 yrs    Palpitations 7/30/2021    Thyroid goiter      Past Surgical History:  Past Surgical History:   Procedure Laterality Date    APPENDECTOMY  1981    with left oophorectomy    BREAST BIOPSY Left 2012    x 2 / both benign    CARPAL TUNNEL RELEASE Right 7/13/2021    CARPAL TUNNEL RELEASE/REPAIR RIGHT performed by Patria Gomes MD at [address] Right 07/2021    CARPAL TUNNEL RELEASE Left 8/10/2021    CARPAL TUNNEL RELEASE/ REPAIR LEFT, PAT AT [facility] performed by Patria Gomes MD at [address] [facility]  2008    COLONOSCOPY  11-16-07    COLONOSCOPY  [id number]    Harsha Carreon Ma (polyps)    COLONOSCOPY  2/12/16    w/polypectomy     COLONOSCOPY N/A 2/18/2019    COLORECTAL CANCER SCREENING, HIGH RISK performed by Joseph Jane MD at [place] [address]. Left 1/8/2020    FLEXIBLE CYSTOSCOPY LEFT DOUBLE J STENT REMOVAL (RECENT SURGERY 12/23/19) KUB ON ARRIVAL performed by Omar Crenshaw MD at [address] [facility] [address] / [address] [facility] / [facility] Left 12/23/2019    CYSTOSCOPY LEFT RETROGRADE PYELOGRAM  LEFT DOUBLE J STENT performed by Magui Esteban MD at [facility]. [address], COLON, DIAGNOSTIC     [address] REPLACEMENT  06/2019    LTKR    KNEE ARTHROSCOPY Bilateral 1987 2003 1994    left X2 and Right X1    LITHOTRIPSY Left 12/23/2019    LEFT ESWL performed by Magui Esteban MD at [address] Left 6/19/2019    LEFT TOTAL KNEE REPLACEMENT- [address] performed by Pb Nielsen MD at [address] [facility] History:  Social History     Tobacco Use    Smoking status: Former Smoker     Packs/day: 0.25     Years: 5.00 Pack years: 1.25     Types: Cigarettes     Quit date: 0     Years since quittin.1    Smokeless tobacco: Never Used   Vaping Use    Vaping Use: Never used   Substance Use Topics    Alcohol use: Yes     Comment: occ.  Drug use: No     Vital Signs:   Vitals:    22 0732   BP: (!) 156/86   Pulse: 85   Resp: 18   Temp: 97.1 °F (36.2 °C)   SpO2: 96%       Physical Exam:  Cardiac:  [x]WNL []Comments:  Pulmonary:  [x]WNL []Comments:   Neuro/Mental Status:  [x]WNL []Comments:  Abdominal:  [x]WNL []Comments:  Other:   []WNL []Comments:    Informed Consent:  The risks and benefits of the procedure have been discussed with either the patient or if they cannot consent, their representative. Assessment:  Patient examined and appropriate for planned sedation and procedure. Plan:  Proceed with planned sedation and procedure as above. The patient was counseled at length about risks of daisy COVID19 in the perioperative and any recovery window from the procedure. The patient was made aware that daisy [address] may worsen their prognosis for recovery from their procedure and lend to a higher morbidity andall mortality risk. The patient was given the option of postponing the procedure all material risks, benefits, and alternatives were discussed. The patient does wish to proceed with the procedure at this time.     Jason Cabrera MD  8:17 AM SI

## 2022-02-21 NOTE — ANESTHESIA POSTPROCEDURE EVALUATION
Department of Anesthesiology  Postprocedure Note    Patient: Klarissa Bustillos  MRN: [id number]  YOB: 1955  Date of evaluation: 2/21/2022  Time:  8:41 AM     Procedure Summary     Date: 02/21/22 Room / Location: [address] [place] [address] [facility]    Anesthesia Start: 2745 Anesthesia Stop: 8215    Procedure: COLORECTAL CANCER SCREENING, HIGH RISK (N/A ) Diagnosis: (History of colon polyps Z86.010)    Surgeons: Dylan Ohara MD Responsible Provider: GIAN Calvert CRNA    Anesthesia Type: MAC ASA Status: 3          Anesthesia Type: MAC    Joseline Phase I: Joseline Score: 10    Joseline Phase II:      Last vitals: Reviewed and per EMR flowsheets.        Anesthesia Post Evaluation    Patient location during evaluation: bedside  Patient participation: complete - patient participated  Level of consciousness: awake and awake and alert  Pain score: 0  Airway patency: patent  Nausea & Vomiting: no nausea and no vomiting  Complications: no  Cardiovascular status: blood pressure returned to baseline and hemodynamically stable  Respiratory status: acceptable and face mask  Hydration status: euvolemic

## 2022-02-21 NOTE — ANESTHESIA PRE PROCEDURE
Department of Anesthesiology  Preprocedure Note       Name:  Jerome Lincoln   Age:  78 y.o.  :  1955                                          MRN:  [id number]         Date:  2022      Surgeon: Peng Dove):  Cody Romero MD    Procedure: Procedure(s):  COLORECTAL CANCER SCREENING, HIGH RISK    Medications prior to admission:   Prior to Admission medications    Medication Sig Start Date End Date Taking? Authorizing Provider   clotrimazole-betamethasone (LOTRISONE) 1-0.05 % cream Apply topically 2 times daily. 22   Alex Figueroa DO   amLODIPine (NORVASC) 5 MG tablet TAKE 1 TABLET BY MOUTH EVERY DAY 12/10/21   Jimmy Benitez MD   pravastatin (PRAVACHOL) 40 MG tablet TAKE 1 TABLET BY MOUTH EVERY DAY 12/10/21   Neno Young MD   levothyroxine (SYNTHROID) 150 MCG tablet TAKE 1 TABLET BY MOUTH EVERY DAY 12/10/21   Neno Young MD   DAR 0.05 MG/24HR Place 1 patch onto the skin Twice a Week 11/15/21   Deedee Robertson DO   erythromycin [facility]) 5 MG/GM ophthalmic ointment  21   Historical Provider, MD       Current medications:    Current Facility-Administered Medications   Medication Dose Route Frequency Provider Last Rate Last Admin    sodium chloride 0.9 % infusion                Allergies: Allergies   Allergen Reactions    Antiseptic Products, Misc.  Rash     duraprep       Problem List:    Patient Active Problem List   Diagnosis Code    HTN (hypertension) I10    Hyperlipidemia E78.5    Hypothyroidism E03.9    Thyroid goiter E04.9    Obesity E66.9    Adenomatous polyp of ascending colon D12.2    Osteoarthritis of left knee M17.12    Left renal stone N20.0    Arthritis of knee M17.10    Calculus of kidney N20.0    Palpitations R00.2       Past Medical History:        Diagnosis Date    Arthritis     both knees    Hyperlipidemia     meds > 10 yrs    Hypertension     meds > 4 yrs    Hyperthyroidism     Hypothyroidism     meds > 20 yrs    Palpitations 2021    Thyroid goiter        Past Surgical History:        Procedure Laterality Date    APPENDECTOMY      with left oophorectomy    BREAST BIOPSY Left 2012    x 2 / both benign    CARPAL TUNNEL RELEASE Right 2021    CARPAL TUNNEL RELEASE/REPAIR RIGHT performed by Sonia Woodruff MD at [address] Right 2021    CARPAL TUNNEL RELEASE Left 8/10/2021    CARPAL TUNNEL RELEASE/ REPAIR LEFT, PAT AT [facility] performed by Sonia Woodruff MD at [address]      COLONOSCOPY  07    COLONOSCOPY  [id number]    Rosa Elena Carreon (polyps)    COLONOSCOPY  16    w/polypectomy     COLONOSCOPY N/A 2019    COLORECTAL CANCER SCREENING, HIGH RISK performed by Ariane Macdonald MD at [address] Left 2020    FLEXIBLE CYSTOSCOPY LEFT DOUBLE J STENT REMOVAL (RECENT SURGERY 19) KUB ON ARRIVAL performed by Dmitriy Jeffries MD at [address] [facility] / [address] [facility] Left 2019    CYSTOSCOPY LEFT RETROGRADE PYELOGRAM  LEFT DOUBLE J STENT performed by Zach House MD at [facility]. [address], COLON, DIAGNOSTIC     [address] REPLACEMENT  2019    LTKR    KNEE ARTHROSCOPY Bilateral 1987    left X2 and Right X1    LITHOTRIPSY Left 2019    LEFT ESWL performed by Zach House MD at [facility] Left 2019    LEFT TOTAL KNEE REPLACEMENT- [address] performed by Francisco Hogan MD at [facility] History:    Social History     Tobacco Use    Smoking status: Former Smoker     Packs/day: 0.25     Years: 5.00     Pack years: 1.25     Types: Cigarettes     Quit date:      Years since quittin.1    Smokeless tobacco: Never Used   Substance Use Topics    Alcohol use: Yes     Comment: occ. Counseling given: Not Answered      Vital Signs (Current): There were no vitals filed for this visit. BP Readings from Last 3 Encounters:   01/13/22 138/82   12/03/21 134/80   10/08/21 130/68       NPO Status:                                                                                 BMI:   Wt Readings from Last 3 Encounters:   01/13/22 216 lb (98 kg)   12/03/21 215 lb (97.5 kg)   10/08/21 218 lb (98.9 kg)     There is no height or weight on file to calculate BMI.    CBC:   Lab Results   Component Value Date    WBC 7.9 08/05/2021    RBC 4.74 08/05/2021    HGB 14.5 08/05/2021    HCT 43.7 08/05/2021    MCV 92.1 08/05/2021    RDW 13.8 08/05/2021     08/05/2021       CMP:   Lab Results   Component Value Date     09/03/2021    K 4.0 09/03/2021     09/03/2021    CO2 21 09/03/2021    BUN 14 09/03/2021    CREATININE 0.75 09/03/2021    GFRAA >60.0 09/03/2021    LABGLOM >60.0 09/03/2021    GLUCOSE 101 09/03/2021    GLUCOSE 94 12/10/2011    PROT 7.3 10/08/2021    CALCIUM 9.3 09/03/2021    BILITOT 0.4 10/08/2021    ALKPHOS 80 10/08/2021    AST 30 10/08/2021    ALT 46 10/08/2021       POC Tests: No results for input(s): POCGLU, POCNA, POCK, POCCL, POCBUN, POCHEMO, POCHCT in the last 72 hours.     Coags:   Lab Results   Component Value Date    PROTIME 13.4 12/18/2019    INR 1.0 12/18/2019    APTT 26.6 05/01/2015       HCG (If Applicable): No results found for: PREGTESTUR, PREGSERUM, HCG, HCGQUANT     ABGs: No results found for: PHART, PO2ART, QNF4WWS, YSU5ANF, BEART, F7JCNEWA     Type & Screen (If Applicable):  No results found for: LABABO, LABRH    Drug/Infectious Status (If Applicable):  No results found for: HIV, HEPCAB    COVID-19 Screening (If Applicable): No results found for: COVID19        Anesthesia Evaluation  Patient summary reviewed and Nursing notes reviewed no history of anesthetic complications:   Airway: Mallampati: III  TM distance: >3 FB   Neck ROM: full  Mouth opening: > = 3 FB Dental: normal exam         Pulmonary:Negative Pulmonary ROS and normal exam                               Cardiovascular:  Exercise tolerance: good (>4 METS),   (+) hypertension:,                   Neuro/Psych:   Negative Neuro/Psych ROS              GI/Hepatic/Renal: Neg GI/Hepatic/Renal ROS            Endo/Other:    (+) hypothyroidism, hyperthyroidism::., .                 Abdominal:   (+) obese,           Vascular: negative vascular ROS. Other Findings:             Anesthesia Plan      MAC     ASA 3       Induction: intravenous. Anesthetic plan and risks discussed with patient. Use of blood products discussed with patient whom. Plan discussed with CRNA.                   GIAN Song - HEMAL   2/21/2022

## 2022-03-27 ASSESSMENT — ENCOUNTER SYMPTOMS
PHOTOPHOBIA: 0
SORE THROAT: 0
SINUS PRESSURE: 0
ABDOMINAL DISTENTION: 0
APNEA: 0
COUGH: 0
EYE DISCHARGE: 0
VOMITING: 0
DIARRHEA: 0
CONSTIPATION: 0
EYE PAIN: 0
COLOR CHANGE: 0
EYE ITCHING: 0
ABDOMINAL PAIN: 0
SHORTNESS OF BREATH: 0
BLOOD IN STOOL: 0
VOICE CHANGE: 0
CHEST TIGHTNESS: 0
RHINORRHEA: 0
EYE REDNESS: 0
SINUS PAIN: 0
TROUBLE SWALLOWING: 0
RECTAL PAIN: 0
NAUSEA: 0
WHEEZING: 0
FACIAL SWELLING: 0
BACK PAIN: 0

## 2022-03-31 ENCOUNTER — OFFICE VISIT (OUTPATIENT)
Dept: FAMILY MEDICINE CLINIC | Age: 67
End: 2022-03-31
Payer: MEDICARE

## 2022-03-31 VITALS
SYSTOLIC BLOOD PRESSURE: 138 MMHG | BODY MASS INDEX: 32.13 KG/M2 | HEART RATE: 79 BPM | OXYGEN SATURATION: 98 % | RESPIRATION RATE: 14 BRPM | HEIGHT: 68 IN | WEIGHT: 212 LBS | DIASTOLIC BLOOD PRESSURE: 80 MMHG

## 2022-03-31 DIAGNOSIS — Z01.818 PRE-OPERATIVE CLEARANCE: ICD-10-CM

## 2022-03-31 DIAGNOSIS — I10 ESSENTIAL HYPERTENSION: ICD-10-CM

## 2022-03-31 DIAGNOSIS — R73.9 HYPERGLYCEMIA: Primary | ICD-10-CM

## 2022-03-31 DIAGNOSIS — E78.5 HYPERLIPIDEMIA, UNSPECIFIED HYPERLIPIDEMIA TYPE: ICD-10-CM

## 2022-03-31 LAB — HBA1C MFR BLD: 5.7 %

## 2022-03-31 PROCEDURE — 1036F TOBACCO NON-USER: CPT | Performed by: INTERNAL MEDICINE

## 2022-03-31 PROCEDURE — 1123F ACP DISCUSS/DSCN MKR DOCD: CPT | Performed by: INTERNAL MEDICINE

## 2022-03-31 PROCEDURE — 83036 HEMOGLOBIN GLYCOSYLATED A1C: CPT | Performed by: INTERNAL MEDICINE

## 2022-03-31 PROCEDURE — 99214 OFFICE O/P EST MOD 30 MIN: CPT | Performed by: INTERNAL MEDICINE

## 2022-03-31 PROCEDURE — G8427 DOCREV CUR MEDS BY ELIG CLIN: HCPCS | Performed by: INTERNAL MEDICINE

## 2022-03-31 PROCEDURE — 4040F PNEUMOC VAC/ADMIN/RCVD: CPT | Performed by: INTERNAL MEDICINE

## 2022-03-31 PROCEDURE — 3017F COLORECTAL CA SCREEN DOC REV: CPT | Performed by: INTERNAL MEDICINE

## 2022-03-31 PROCEDURE — G8484 FLU IMMUNIZE NO ADMIN: HCPCS | Performed by: INTERNAL MEDICINE

## 2022-03-31 PROCEDURE — G8417 CALC BMI ABV UP PARAM F/U: HCPCS | Performed by: INTERNAL MEDICINE

## 2022-03-31 PROCEDURE — G8399 PT W/DXA RESULTS DOCUMENT: HCPCS | Performed by: INTERNAL MEDICINE

## 2022-03-31 PROCEDURE — 1090F PRES/ABSN URINE INCON ASSESS: CPT | Performed by: INTERNAL MEDICINE

## 2022-03-31 ASSESSMENT — PATIENT HEALTH QUESTIONNAIRE - PHQ9
SUM OF ALL RESPONSES TO PHQ QUESTIONS 1-9: 0
SUM OF ALL RESPONSES TO PHQ9 QUESTIONS 1 & 2: 0
1. LITTLE INTEREST OR PLEASURE IN DOING THINGS: 0
2. FEELING DOWN, DEPRESSED OR HOPELESS: 0

## 2022-04-02 DIAGNOSIS — Z01.818 PRE-OPERATIVE CLEARANCE: ICD-10-CM

## 2022-04-02 DIAGNOSIS — I10 ESSENTIAL HYPERTENSION: ICD-10-CM

## 2022-04-02 DIAGNOSIS — E03.9 HYPOTHYROIDISM, UNSPECIFIED TYPE: ICD-10-CM

## 2022-04-02 DIAGNOSIS — E78.5 HYPERLIPIDEMIA, UNSPECIFIED HYPERLIPIDEMIA TYPE: ICD-10-CM

## 2022-04-02 LAB
ALBUMIN SERPL-MCNC: 4.4 G/DL (ref 3.5–4.6)
ALP BLD-CCNC: 66 U/L (ref 40–130)
ALT SERPL-CCNC: 25 U/L (ref 0–33)
ANION GAP SERPL CALCULATED.3IONS-SCNC: 12 MEQ/L (ref 9–15)
AST SERPL-CCNC: 18 U/L (ref 0–35)
BASOPHILS ABSOLUTE: 0 K/UL (ref 0–0.2)
BASOPHILS RELATIVE PERCENT: 0.7 %
BILIRUB SERPL-MCNC: 0.4 MG/DL (ref 0.2–0.7)
BUN BLDV-MCNC: 16 MG/DL (ref 8–23)
CALCIUM SERPL-MCNC: 8.9 MG/DL (ref 8.5–9.9)
CHLORIDE BLD-SCNC: 105 MEQ/L (ref 95–107)
CHOLESTEROL, TOTAL: 159 MG/DL (ref 0–199)
CO2: 21 MEQ/L (ref 20–31)
CREAT SERPL-MCNC: 0.85 MG/DL (ref 0.5–0.9)
EOSINOPHILS ABSOLUTE: 0.2 K/UL (ref 0–0.7)
EOSINOPHILS RELATIVE PERCENT: 2.4 %
GFR AFRICAN AMERICAN: >60
GFR NON-AFRICAN AMERICAN: >60
GLOBULIN: 2.6 G/DL (ref 2.3–3.5)
GLUCOSE BLD-MCNC: 104 MG/DL (ref 70–99)
HCT VFR BLD CALC: 43.4 % (ref 37–47)
HDLC SERPL-MCNC: 33 MG/DL (ref 40–59)
HEMOGLOBIN: 14.8 G/DL (ref 12–16)
INR BLD: 1
LDL CHOLESTEROL CALCULATED: 101 MG/DL (ref 0–129)
LYMPHOCYTES ABSOLUTE: 1.7 K/UL (ref 1–4.8)
LYMPHOCYTES RELATIVE PERCENT: 27.6 %
MCH RBC QN AUTO: 30.4 PG (ref 27–31.3)
MCHC RBC AUTO-ENTMCNC: 34 % (ref 33–37)
MCV RBC AUTO: 89.5 FL (ref 82–100)
MONOCYTES ABSOLUTE: 0.5 K/UL (ref 0.2–0.8)
MONOCYTES RELATIVE PERCENT: 7.2 %
NEUTROPHILS ABSOLUTE: 3.9 K/UL (ref 1.4–6.5)
NEUTROPHILS RELATIVE PERCENT: 62.1 %
PDW BLD-RTO: 13.7 % (ref 11.5–14.5)
PLATELET # BLD: 289 K/UL (ref 130–400)
POTASSIUM SERPL-SCNC: 4.2 MEQ/L (ref 3.4–4.9)
PROTHROMBIN TIME: 13.2 SEC (ref 12.3–14.9)
RBC # BLD: 4.85 M/UL (ref 4.2–5.4)
SODIUM BLD-SCNC: 138 MEQ/L (ref 135–144)
T4 FREE: 1.26 NG/DL (ref 0.84–1.68)
TOTAL PROTEIN: 7 G/DL (ref 6.3–8)
TRIGL SERPL-MCNC: 124 MG/DL (ref 0–150)
TSH REFLEX: 0.92 UIU/ML (ref 0.44–3.86)
WBC # BLD: 6.3 K/UL (ref 4.8–10.8)

## 2022-04-08 ENCOUNTER — OFFICE VISIT (OUTPATIENT)
Dept: ENDOCRINOLOGY | Age: 67
End: 2022-04-08
Payer: MEDICARE

## 2022-04-08 VITALS
HEIGHT: 67 IN | OXYGEN SATURATION: 97 % | WEIGHT: 213 LBS | HEART RATE: 73 BPM | SYSTOLIC BLOOD PRESSURE: 111 MMHG | BODY MASS INDEX: 33.43 KG/M2 | DIASTOLIC BLOOD PRESSURE: 75 MMHG

## 2022-04-08 DIAGNOSIS — E03.9 HYPOTHYROIDISM, UNSPECIFIED TYPE: Primary | ICD-10-CM

## 2022-04-08 DIAGNOSIS — E78.5 HYPERLIPIDEMIA, UNSPECIFIED HYPERLIPIDEMIA TYPE: ICD-10-CM

## 2022-04-08 PROCEDURE — 99213 OFFICE O/P EST LOW 20 MIN: CPT | Performed by: INTERNAL MEDICINE

## 2022-04-08 PROCEDURE — 3017F COLORECTAL CA SCREEN DOC REV: CPT | Performed by: INTERNAL MEDICINE

## 2022-04-08 PROCEDURE — 1036F TOBACCO NON-USER: CPT | Performed by: INTERNAL MEDICINE

## 2022-04-08 PROCEDURE — G8417 CALC BMI ABV UP PARAM F/U: HCPCS | Performed by: INTERNAL MEDICINE

## 2022-04-08 PROCEDURE — G8427 DOCREV CUR MEDS BY ELIG CLIN: HCPCS | Performed by: INTERNAL MEDICINE

## 2022-04-08 PROCEDURE — G8399 PT W/DXA RESULTS DOCUMENT: HCPCS | Performed by: INTERNAL MEDICINE

## 2022-04-08 PROCEDURE — 4040F PNEUMOC VAC/ADMIN/RCVD: CPT | Performed by: INTERNAL MEDICINE

## 2022-04-08 PROCEDURE — 1090F PRES/ABSN URINE INCON ASSESS: CPT | Performed by: INTERNAL MEDICINE

## 2022-04-08 PROCEDURE — 1123F ACP DISCUSS/DSCN MKR DOCD: CPT | Performed by: INTERNAL MEDICINE

## 2022-04-08 ASSESSMENT — ENCOUNTER SYMPTOMS: EYES NEGATIVE: 1

## 2022-05-25 ASSESSMENT — ENCOUNTER SYMPTOMS
VOICE CHANGE: 0
CONSTIPATION: 0
SHORTNESS OF BREATH: 0
BLOOD IN STOOL: 0
COUGH: 0
FACIAL SWELLING: 0
ABDOMINAL DISTENTION: 0
APNEA: 0
EYE DISCHARGE: 0
BACK PAIN: 0
VOMITING: 0
EYE REDNESS: 0
SINUS PRESSURE: 0
WHEEZING: 0
RECTAL PAIN: 0
CHEST TIGHTNESS: 0
SINUS PAIN: 0
RHINORRHEA: 0
SORE THROAT: 0
DIARRHEA: 0
COLOR CHANGE: 0
ABDOMINAL PAIN: 0
PHOTOPHOBIA: 0
TROUBLE SWALLOWING: 0
NAUSEA: 0
EYE PAIN: 0
EYE ITCHING: 0

## 2022-05-25 NOTE — PROGRESS NOTES
2022    Lei Alvarado (:  1955) is a 78 y.o. female, here for evaluation of the following medical concerns:    Hypertension  Pertinent negatives include no chest pain, headaches, neck pain, palpitations or shortness of breath. 50-year-old female with a history of essential hypertension, hypothyroidism and hyperlipidemia for preoperative clearance. Shelley Whitlock is s/p TRKR in 2 weeks. She voices no complaints. Right upper quadrant pain: Well-controlled at this time. Dyspnea: Stable at this time. Hyperglycemia: Hemoglobin A1c was obtained on 2020. It was 5.8. It is 5.9 today. Hypertension:compliant with Norvasc. BP elevated today. Menopausal symptoms: The patient symptoms are well controlled via estradiol which she takes        Hyperlipidemia: In regards to her hyperlipidemia the patient is compliant with pravastatin 40 mg orally daily        Hypothyroidism: In regards to her hypothyroidism the patient is compliant with Synthroid 150 mcg daily      Obesity: The patient's body mass index is presently 33. 18. Review of Systems   Constitutional: Negative for chills, diaphoresis, fatigue and fever. HENT: Negative for congestion, dental problem, drooling, ear discharge, ear pain, facial swelling, hearing loss, mouth sores, nosebleeds, postnasal drip, rhinorrhea, sinus pressure, sinus pain, sneezing, sore throat, tinnitus, trouble swallowing and voice change. Eyes: Negative for photophobia, pain, discharge, redness, itching and visual disturbance. Respiratory: Negative for apnea, cough, chest tightness, shortness of breath and wheezing. Cardiovascular: Negative for chest pain, palpitations and leg swelling. Gastrointestinal: Negative for abdominal distention, abdominal pain, blood in stool, constipation, diarrhea, nausea, rectal pain and vomiting.    Endocrine: Negative for cold intolerance, heat intolerance, polydipsia, polyphagia and polyuria. Genitourinary: Negative for decreased urine volume, difficulty urinating, dysuria, flank pain, frequency, genital sores, hematuria and urgency. Musculoskeletal: Negative for arthralgias, back pain, gait problem, joint swelling, myalgias, neck pain and neck stiffness. Skin: Negative for color change, rash and wound. Allergic/Immunologic: Negative for environmental allergies and food allergies. Neurological: Negative for dizziness, tremors, seizures, syncope, facial asymmetry, speech difficulty, weakness, light-headedness, numbness and headaches. Hematological: Negative for adenopathy. Does not bruise/bleed easily. Psychiatric/Behavioral: Negative for agitation, confusion, decreased concentration, hallucinations, self-injury, sleep disturbance and suicidal ideas. The patient is not nervous/anxious. Prior to Visit Medications    Medication Sig Taking? Authorizing Provider   amLODIPine (NORVASC) 5 MG tablet TAKE 1 TABLET BY MOUTH EVERY DAY  Toribio Holly MD   pravastatin (PRAVACHOL) 40 MG tablet TAKE 1 TABLET BY MOUTH EVERY DAY  Tiffani Bell MD   levothyroxine (SYNTHROID) 150 MCG tablet TAKE 1 TABLET BY MOUTH EVERY DAY  Jose Alejandro Aiken MD   DAR 0.05 MG/24HR Place 1 patch onto the skin Twice a Week  Alexis Figueroa DO   erythromycin [facility]) 5 MG/GM ophthalmic ointment   Historical Provider, MD        Allergies   Allergen Reactions    Antiseptic Products, Misc.  Rash     duraprep       Past Medical History:   Diagnosis Date    Arthritis     both knees    Hyperlipidemia     meds > 10 yrs    Hypertension     meds > 4 yrs    Hyperthyroidism     Hypothyroidism     meds > 20 yrs    Palpitations 7/30/2021    Thyroid goiter        Past Surgical History:   Procedure Laterality Date    APPENDECTOMY  1981    with left oophorectomy    BREAST BIOPSY Left 2012    x 2 / both benign    CARPAL TUNNEL RELEASE Right 7/13/2021    CARPAL TUNNEL RELEASE/REPAIR RIGHT performed by Florence Sabine Aranda MD at [address] Right 2021    CARPAL TUNNEL RELEASE Left 8/10/2021    CARPAL TUNNEL RELEASE/ REPAIR LEFT, PAT AT [facility] performed by Gracie Jacques MD at [address] [facility]      COLONOSCOPY  07    COLONOSCOPY  [id number]    Tony Carreon (polyps)    COLONOSCOPY  16    w/polypectomy     COLONOSCOPY N/A 2019    COLORECTAL CANCER SCREENING, HIGH RISK performed by Benitez Andrews MD at [facility] 2022    COLORECTAL CANCER SCREENING, HIGH RISK performed by Marily Martinez MD at [facility] [address] Left 2020    FLEXIBLE CYSTOSCOPY LEFT DOUBLE J [address] (RECENT SURGERY 19) KUB ON ARRIVAL performed by Denae Sarmiento MD at [address] / [address] / Karuna Mcnultys Left 2019    CYSTOSCOPY LEFT RETROGRADE PYELOGRAM  LEFT DOUBLE J STENT performed by Jaren Martin MD at [address] [facility], [facility]     [address] REPLACEMENT  2019    LTKR    KNEE ARTHROSCOPY Bilateral 1987    left X2 and Right X1    LITHOTRIPSY Left 2019    LEFT ESWL performed by Jaren Martin MD at [address] [facility] Left 2019    LEFT TOTAL KNEE REPLACEMENT- [address] performed by Zain Beard MD at [address] [place] [address] Marital status:      Spouse name: Not on file    Number of children: Not on file    Years of education: Not on file    Highest education level: Not on file   Occupational History    Not on file   Tobacco Use    Smoking status: Former Smoker     Packs/day: 0.25     Years: 5.00     Pack years: 1.25     Types: Cigarettes     Quit date:      Years since quittin.4    Smokeless tobacco: Never Used   Vaping Use    Vaping Use: Never used   Substance and Sexual Activity    Alcohol use: Yes     Comment: occ.     Drug use: No    Sexual activity: Not on file   Other Topics Concern    Not on file   Social History Narrative    Not on file     Social Determinants of Health     Financial Resource Strain: Low Risk     Difficulty of Paying Living Expenses: Not hard at all   Food Insecurity: No Food Insecurity    Worried About Running Out of Food in the Last Year: Never true    [address] in the Last Year: Never true   Transportation Needs: No Transportation Needs    Lack of Transportation (Medical): No    Lack of Transportation (Non-Medical): No   Physical Activity:     Days of Exercise per Week: Not on file    Minutes of Exercise per Session: Not on file   Stress:     Feeling of Stress : Not on file   Social Connections:     Frequency of Communication with Friends and Family: Not on file    Frequency of Social Gatherings with Friends and Family: Not on file    Attends Spiritism Services: Not on file    Active Member of [address] [place] [address] [employer] or Organizations: Not on file    Attends Club or Organization Meetings: Not on file    Marital Status: Not on file   Intimate Partner Violence:     Fear of Current or Ex-Partner: Not on file    Emotionally Abused: Not on file    Physically Abused: Not on file    Sexually Abused: Not on file   Housing Stability:     Unable to Pay for Housing in the Last Year: Not on file    Number of [place] in the Last Year: Not on file    Unstable Housing in the Last Year: Not on file        Family History   Problem Relation Age of Onset    Heart Disease Mother     Cancer Mother         RENAL CELL    COPD Mother     Heart Attack Mother     Diabetes Other     Breast Cancer Other     Colon Cancer Father     Macular Degen Father     Cancer Brother         prostate cancer    No Known Problems Son        There were no vitals filed for this visit. Estimated body mass index is 33.36 kg/m² as calculated from the following:    Height as of 4/8/22: 5' 7\" (1.702 m).     Weight as of 4/8/22: 213 lb (96.6 kg).    Physical Exam  Constitutional:       General: She is not in acute distress. Appearance: She is well-developed. HENT:      Head: Normocephalic. Right Ear: External ear normal.      Left Ear: External ear normal.   Eyes:      Conjunctiva/sclera: Conjunctivae normal.   Neck:      Vascular: No JVD. Trachea: No tracheal deviation. Cardiovascular:      Rate and Rhythm: Normal rate and regular rhythm. Heart sounds: Normal heart sounds. Pulmonary:      Effort: Pulmonary effort is normal. No respiratory distress. Breath sounds: Normal breath sounds. No wheezing or rales. Chest:      Chest wall: No tenderness. Abdominal:      General: Bowel sounds are normal. There is no distension. Palpations: Abdomen is soft. There is no mass. Tenderness: There is no abdominal tenderness. There is no guarding or rebound. Musculoskeletal:         General: No tenderness or deformity. Cervical back: Neck supple. Lymphadenopathy:      Cervical: No cervical adenopathy. Skin:     General: Skin is warm and dry. Coloration: Skin is not pale. Findings: No erythema or rash. Neurological:      Mental Status: She is alert and oriented to person, place, and time. Motor: No abnormal muscle tone. Psychiatric:         Thought Content: Thought content normal.         Judgment: Judgment normal.         ASSESSMENT/PLAN:        Essential hypertension  -Continue Norvasc 5 mg po daily bisoprolol dc'd due to headaches. Pt will inform me of bp measurements over the next 2 weeks. Will either add lotrel or a low dose HCTZ. Hypothyroidism, unspecified type  -Continue Synthroid 150 mcg daily           Hyperlipidemia, unspecified hyperlipidemia type-continue pravastatin          Hyperglycemia  -Encouraged aerobic exercise daily. ds.        Class 1 obesity due to excess calories with serious comorbidity and body mass index (BMI)= 33.18 as stated per #1.       Right upper quadrant pain: Resolved. Monitoring. Postmenopausal symptoms-continue estradiol. Preoperative clearance the patient is medically clear for surgery. No follow-ups on file. An  electronic signature was used to authenticate this note.     --Yue Dobbs MD on 5/25/2022 at 1:08 PM

## 2022-05-26 ENCOUNTER — OFFICE VISIT (OUTPATIENT)
Dept: FAMILY MEDICINE CLINIC | Age: 67
End: 2022-05-26

## 2022-05-26 ENCOUNTER — OFFICE VISIT (OUTPATIENT)
Dept: FAMILY MEDICINE CLINIC | Age: 67
End: 2022-05-26
Payer: MEDICARE

## 2022-05-26 VITALS
SYSTOLIC BLOOD PRESSURE: 138 MMHG | HEIGHT: 68 IN | RESPIRATION RATE: 14 BRPM | OXYGEN SATURATION: 98 % | DIASTOLIC BLOOD PRESSURE: 76 MMHG | HEART RATE: 92 BPM | TEMPERATURE: 97.6 F | BODY MASS INDEX: 31.98 KG/M2 | WEIGHT: 211 LBS

## 2022-05-26 VITALS
SYSTOLIC BLOOD PRESSURE: 138 MMHG | BODY MASS INDEX: 31.98 KG/M2 | DIASTOLIC BLOOD PRESSURE: 76 MMHG | RESPIRATION RATE: 14 BRPM | HEIGHT: 68 IN | HEART RATE: 98 BPM | OXYGEN SATURATION: 98 % | WEIGHT: 211 LBS

## 2022-05-26 DIAGNOSIS — Z00.00 INITIAL MEDICARE ANNUAL WELLNESS VISIT: Primary | ICD-10-CM

## 2022-05-26 DIAGNOSIS — E03.9 HYPOTHYROIDISM, UNSPECIFIED TYPE: ICD-10-CM

## 2022-05-26 DIAGNOSIS — Z00.00 MEDICARE ANNUAL WELLNESS VISIT, SUBSEQUENT: ICD-10-CM

## 2022-05-26 DIAGNOSIS — Z96.651 HISTORY OF TOTAL RIGHT KNEE REPLACEMENT: ICD-10-CM

## 2022-05-26 DIAGNOSIS — I10 ESSENTIAL HYPERTENSION: Primary | ICD-10-CM

## 2022-05-26 PROCEDURE — 1036F TOBACCO NON-USER: CPT | Performed by: INTERNAL MEDICINE

## 2022-05-26 PROCEDURE — 3017F COLORECTAL CA SCREEN DOC REV: CPT | Performed by: INTERNAL MEDICINE

## 2022-05-26 PROCEDURE — 1090F PRES/ABSN URINE INCON ASSESS: CPT | Performed by: INTERNAL MEDICINE

## 2022-05-26 PROCEDURE — G8427 DOCREV CUR MEDS BY ELIG CLIN: HCPCS | Performed by: INTERNAL MEDICINE

## 2022-05-26 PROCEDURE — G0439 PPPS, SUBSEQ VISIT: HCPCS | Performed by: INTERNAL MEDICINE

## 2022-05-26 PROCEDURE — G8417 CALC BMI ABV UP PARAM F/U: HCPCS | Performed by: INTERNAL MEDICINE

## 2022-05-26 PROCEDURE — G8399 PT W/DXA RESULTS DOCUMENT: HCPCS | Performed by: INTERNAL MEDICINE

## 2022-05-26 PROCEDURE — 1123F ACP DISCUSS/DSCN MKR DOCD: CPT | Performed by: INTERNAL MEDICINE

## 2022-05-26 PROCEDURE — 99214 OFFICE O/P EST MOD 30 MIN: CPT | Performed by: INTERNAL MEDICINE

## 2022-05-26 ASSESSMENT — PATIENT HEALTH QUESTIONNAIRE - PHQ9
SUM OF ALL RESPONSES TO PHQ QUESTIONS 1-9: 0
SUM OF ALL RESPONSES TO PHQ9 QUESTIONS 1 & 2: 0
2. FEELING DOWN, DEPRESSED OR HOPELESS: 0
1. LITTLE INTEREST OR PLEASURE IN DOING THINGS: 0

## 2022-05-26 NOTE — PATIENT INSTRUCTIONS
Personalized Preventive Plan for Faith Santana - 5/26/2022  Medicare offers a range of preventive health benefits. Some of the tests and screenings are paid in full while other may be subject to a deductible, co-insurance, and/or copay. Some of these benefits include a comprehensive review of your medical history including lifestyle, illnesses that may run in your family, and various assessments and screenings as appropriate. After reviewing your medical record and screening and assessments performed today your provider may have ordered immunizations, labs, imaging, and/or referrals for you. A list of these orders (if applicable) as well as your Preventive Care list are included within your After Visit Summary for your review. Other Preventive Recommendations:    · A preventive eye exam performed by an eye specialist is recommended every 1-2 years to screen for glaucoma; cataracts, macular degeneration, and other eye disorders. · A preventive dental visit is recommended every 6 months. · Try to get at least 150 minutes of exercise per week or 10,000 steps per day on a pedometer . · Order or download the FREE \"Exercise & Physical Activity: Your Everyday Guide\" from [employer]. Call 0-623.399.2968 or search [employer] online. · You need 1343-7703 mg of calcium and 4284-3659 IU of vitamin D per day. It is possible to meet your calcium requirement with diet alone, but a vitamin D supplement is usually necessary to meet this goal.  · When exposed to the sun, use a sunscreen that protects against both UVA and UVB radiation with an SPF of 30 or greater. Reapply every 2 to 3 hours or after sweating, drying off with a towel, or swimming. · Always wear a seat belt when traveling in a car. Always wear a helmet when riding a bicycle or motorcycle. Heart-Healthy Diet   Sodium, Fat, and Cholesterol Controlled Diet       What Is a Heart Healthy Diet?    A heart-healthy diet is one that limits sodium , certain types of fat , and cholesterol . This type of diet is recommended for:   People with any form of cardiovascular disease (eg, coronary heart disease , peripheral vascular disease , previous heart attack , previous stroke )   People with risk factors for cardiovascular disease, such as high blood pressure , high cholesterol , or diabetes   Anyone who wants to lower their risk of developing cardiovascular disease   Sodium    Sodium is a mineral found in many foods. In general, most people consume much more sodium than they need. Diets high in sodium can increase blood pressure and lead to edema (water retention). On a heart-healthy diet, you should consume no more than 2,300 mg (milligrams) of sodium per dayabout the amount in one teaspoon of table salt. The foods highest in sodium include table salt (about 50% sodium), processed foods, convenience foods, and preserved foods. Cholesterol    Cholesterol is a fat-like, waxy substance in your blood. Our bodies make some cholesterol. It is also found in animal products, with the highest amounts in fatty meat, egg yolks, whole milk, cheese, shellfish, and organ meats. On a heart-healthy diet, you should limit your cholesterol intake to less than 200 mg per day. It is normal and important to have some cholesterol in your bloodstream. But too much cholesterol can cause plaque to build up within your arteries, which can eventually lead to a heart attack or stroke. The two types of cholesterol that are most commonly referred to are:   Low-density lipoprotein (LDL) cholesterol  Also known as bad cholesterol, this is the cholesterol that tends to build up along your arteries. Bad cholesterol levels are increased by eating fats that are saturated or hydrogenated. Optimal level of this cholesterol is less than 100. Over 130 starts to get risky for heart disease.    High-density lipoprotein (HDL) cholesterol  Also known as good cholesterol, this type of cholesterol actually carries cholesterol away from your arteries and may, therefore, help lower your risk of having a heart attack. You want this level to be high (ideally greater than 60). It is a risk to have a level less than 40. You can raise this good cholesterol by eating olive oil, canola oil, avocados, or nuts. Exercise raises this level, too. Fat    Fat is calorie dense and packs a lot of calories into a small amount of food. Even though fats should be limited due to their high calorie content, not all fats are bad. In fact, some fats are quite healthful. Fat can be broken down into four main types. The good-for-you fats are:   Monounsaturated fat  found in oils such as olive and canola, avocados, and nuts and natural nut butters; can decrease cholesterol levels, while keeping levels of HDL cholesterol high   Polyunsaturated fat  found in oils such as safflower, sunflower, soybean, corn, and sesame; can decrease total cholesterol and LDL cholesterol   Omega-3 fatty acids  particularly those found in fatty fish (such as salmon, trout, tuna, mackerel, herring, and sardines); can decrease risk of arrhythmias, decrease triglyceride levels, and slightly lower blood pressure   The fats that you want to limit are:   Saturated fat  found in animal products, many fast foods, and a few vegetables; increases total blood cholesterol, including LDL levels   Animal fats that are saturated include: butter, lard, whole-milk dairy products, meat fat, and poultry skin   Vegetable fats that are saturated include: hydrogenated shortening, palm oil, coconut oil, cocoa butter   Hydrogenated or trans fat  found in margarine and vegetable shortening, most shelf stable snack foods, and fried foods; increases LDL and decreases HDL     It is generally recommended that you limit your total fat for the day to less than 30% of your total calories.  If you follow an 1800-calorie heart healthy diet, for example, this would mean 60 grams of fat or less per day. Saturated fat and trans fat in your diet raises your blood cholesterol the most, much more than dietary cholesterol does. For this reason, on a heart-healthy diet, less than 7% of your calories should come from saturated fat and ideally 0% from trans fat. On an 1800-calorie diet, this translates into less than 14 grams of saturated fat per day, leaving 46 grams of fat to come from mono- and polyunsaturated fats.    Food Choices on a Heart Healthy Diet   Food Category   Foods Recommended   Foods to Avoid   Grains   Breads and rolls without salted tops Most dry and cooked cereals Unsalted crackers and breadsticks Low-sodium or homemade breadcrumbs or stuffing All rice and pastas   Breads, rolls, and crackers with salted tops High-fat baked goods (eg, muffins, donuts, pastries) Quick breads, self-rising flour, and biscuit mixes Regular bread crumbs Instant hot cereals Commercially prepared rice, pasta, or stuffing mixes   Vegetables   Most fresh, frozen, and low-sodium canned vegetables Low-sodium and salt-free vegetable juices Canned vegetables if unsalted or rinsed   Regular canned vegetables and juices, including sauerkraut and pickled vegetables Frozen vegetables with sauces Commercially prepared potato and vegetable mixes   Fruits   Most fresh, frozen, and canned fruits All fruit juices   Fruits processed with salt or sodium   Milk   Nonfat or low-fat (1%) milk Nonfat or low-fat yogurt Cottage cheese, low-fat ricotta, cheeses labeled as low-fat and low-sodium   Whole milk Reduced-fat (2%) milk Malted and chocolate milk Full fat yogurt Most cheeses (unless low-fat and low salt) Buttermilk (no more than 1 cup per week)   Meats and Beans   Lean cuts of fresh or frozen beef, veal, lamb, or pork (look for the word loin) Fresh or frozen poultry without the skin Fresh or frozen fish and some shellfish Egg whites and egg substitutes (Limit whole eggs to three per week) Tofu Nuts or seeds (unsalted, dry-roasted), low-sodium peanut butter Dried peas, beans, and lentils   Any smoked, cured, salted, or canned meat, fish, or poultry (including cole, chipped beef, cold cuts, hot dogs, sausages, sardines, and anchovies) Poultry skins Breaded and/or fried fish or meats Canned peas, beans, and lentils Salted nuts   Fats and Oils   Olive oil and canola oil Low-sodium, low-fat salad dressings and mayonnaise   Butter, margarine, coconut and palm oils, cole fat   Snacks, Sweets, and Condiments   Low-sodium or unsalted versions of broths, soups, soy sauce, and condiments Pepper, herbs, and spices; vinegar, lemon, or lime juice Low-fat frozen desserts (yogurt, sherbet, fruit bars) Sugar, cocoa powder, honey, syrup, jam, and preserves Low-fat, trans-fat free cookies, cakes, and pies Parag and animal crackers, fig bars, jack snaps   High-fat desserts Broth, soups, gravies, and sauces, made from instant mixes or other high-sodium ingredients Salted snack foods Canned olives Meat tenderizers, seasoning salt, and most flavored vinegars   Beverages   Low-sodium carbonated beverages Tea and coffee in moderation Soy milk   Commercially softened water   Suggestions   Make whole grains, fruits, and vegetables the base of your diet. Choose heart-healthy fats such as canola, olive, and flaxseed oil, and foods high in heart-healthy fats, such as nuts, seeds, soybeans, tofu, and fish. Eat fish at least twice per week; the fish highest in omega-3 fatty acids and lowest in mercury include salmon, herring, mackerel, sardines, and canned chunk light tuna. If you eat fish less than twice per week or have high triglycerides, talk to your doctor about taking fish oil supplements. Read food labels.    For products low in fat and cholesterol, look for fat free, low-fat, cholesterol free, saturated fat free, and trans fat freeAlso scan the Nutrition Facts Label, which lists saturated fat, trans fat, and cholesterol amounts. For products low in sodium, look for sodium free, very low sodium, low sodium, no added salt, and unsalted   Skip the salt when cooking or at the table; if food needs more flavor, get creative and try out different herbs and spices. Garlic and onion also add substantial flavor to foods. Trim any visible fat off meat and poultry before cooking, and drain the fat off after godwin. Use cooking methods that require little or no added fat, such as grilling, boiling, baking, poaching, broiling, roasting, steaming, stir-frying, and sauting. Avoid fast food and convenience food. They tend to be high in saturated and trans fat and have a lot of added salt. Talk to a registered dietitian for individualized diet advice. Last Reviewed: March 2011 Ana Esparza MS, MPH, RD   Updated: 3/29/2011   ·     Keep Your Memory Teresa Duverney       Many factors can affect your ability to remembera hectic lifestyle, aging, stress, chronic disease, and certain medicines. But, there are steps you can take to sharpen your mind and help preserve your memory. Challenge Your Brain   Regularly challenging your mind may help keeps it in top shape. Good mental exercises include:   Crossword puzzlesUse a dictionary if you need it; you will learn more that way. Brainteasers Try some! Crafts, such as wood working and sewing   Hobbies, such as gardening and building model airplanes   SocializingVisit old friends or join groups to meet new ones. Reading   Learning a new language   Taking a class, whether it be art history or corby chi   TravelingExperience the food, history, and culture of your destination   Learning to use a computer   Going to museums, the theater, or thought-provoking movies   Changing things in your daily life, such as reversing your pattern in the grocery store or brushing your teeth using your nondominant hand   Use Memory Aids   There is no need to remember every detail on your own.  These memory aids can help:   Calendars and day planners   Electronic organizers to store all sorts of helpful informationThese devices can \"beep\" to remind you of appointments. A book of days to record birthdays, anniversaries, and other occasions that occur on the same date every year   Detailed \"to-do\" lists and strategically placed sticky notes   Quick \"study\" sessionsBefore a gathering, review who will be there so their names will be fresh in your mind. Establish routinesFor example, keep your keys, wallet, and umbrella in the same place all the time or take medicine with your 8:00 AM glass of juice   Live a Healthy Life   Many actions that will keep your body strong will do the same for your mind. For example:   Talk to Your Doctor About Herbs and Supplements    Malnutrition and vitamin deficiencies can impair your mental function. For example, vitamin B12 deficiency can cause a range of symptoms, including confusion. But, what if your nutritional needs are being met? Can herbs and supplements still offer a benefit? Researchers have investigated a range of natural remedies, such as ginkgo , ginseng , and the supplement phosphatidylserine (PS). So far, though, the evidence is inconsistent as to whether these products can improve memory or thinking. If you are interested in taking herbs and supplements, talk to your doctor first because they may interact with other medicines that you are taking. Exercise Regularly    Among the many benefits of regular exercise are increased blood flow to the brain and decreased risk of certain diseases that can interfere with memory function. One study found that even moderate exercise has a beneficial effect. Examples of \"moderate\" exercise include:   Playing 18 holes of golf once a week, without a cart   Playing tennis twice a week   Walking one mile per day   Manage Stress    It can be tough to remember what is important when your mind is cluttered.  Make time for relaxation. Choose activities that calm you down, and make it routine. Manage Chronic Conditions    Side effects of high blood pressure , diabetes, and heart disease can interfere with mental function. Many of the lifestyle steps discussed here can help manage these conditions. Strive to eat a healthy diet, exercise regularly, get stress under control, and follow your doctor's advice for your condition. Minimize Medications    Talk to your doctor about the medicines that you take. Some may be unnecessary. Also, healthy lifestyle habits may lower the need for certain drugs. Last Reviewed: April 2010 Jesús Oneill MD   Updated: 4/13/2010   ·     [address]       As we get older, changes in balance, gait, strength, vision, hearing, and cognition make even the most youthful senior more prone to accidents. Falls are one of the leading health risks for older people. This increased risk of falling is related to:   Aging process (eg, decreased muscle strength, slowed reflexes)   Higher incidence of chronic health problems (eg, arthritis, diabetes) that may limit mobility, agility or sensory awareness   Side effects of medicine (eg, dizziness, blurred vision)especially medicines like prescription pain medicines and drugs used to treat mental health conditions   Depending on the brittleness of your bones, the consequences of a fall can be serious and long lasting. Home Life   Research by the Association of Aging [facility]) shows that some home accidents among older adults can be prevented by making simple lifestyle changes and basic modifications and repairs to the home environment. Here are some lifestyle changes that experts recommend:   Have your hearing and vision checked regularly. Be sure to wear prescription glasses that are right for you. Speak to your doctor or pharmacist about the possible side effects of your medicines. A number of medicines can cause dizziness.    If you have problems with sleep, talk to your doctor. Limit your intake of alcohol. If necessary, use a cane or walker to help maintain your balance. Wear supportive, rubber-soled shoes, even at home. If you live in a region that gets wintry weather, you may want to put special cleats on your shoes to prevent you from slipping on the snow and ice. Exercise regularly to help maintain muscle tone, agility, and balance. Always hold the banister when going up or down stairs. Also, use  bars when getting in or out of the bath or shower, or using the toilet. To avoid dizziness, get up slowly from a lying down position. Sit up first, dangling your legs for a minute or two before rising to a standing position. Overall Home Safety Check   According to the [employer]'s \"Older Consumer Home Safety Checklist,\" it is important to check for potential hazards in each room. And remember, proper lighting is an essential factor in home safety. If you cannot see clearly, you are more likely to fall. Important questions to ask yourself include:   Are lamp, electric, extension, and telephone cords placed out of the flow of traffic and maintained in good condition? Have frayed cords been replaced? Are all small rugs and runners slip resistant? If not, you can secure them to the floor with a special double-sided carpet tape. Are smoke detectors properly locatedone on every floor of your home and one outside of every sleeping area? Are they in good working order? Are batteries replaced at least once a year? Do you have a well-maintained carbon monoxide detector outside every sleeping are in your home? Does your furniture layout leave plenty of space to maneuver between and around chairs, tables, beds, and sofas? Are hallways, stairs and passages between rooms well lit? Can you reach a lamp without getting out of bed? Are floor surfaces well maintained?  Shag rugs, high-pile carpeting, tile floors, and polished wood floors can be particularly slippery. Stairs should always have handrails and be carpeted or fitted with a non-skid tread. Is your telephone easily reachable. Is the cord safely tucked away? Room by Room   According to the Association of Aging, bathrooms and isha are the two most potentially hazardous rooms in your home. In the Kitchen    Be sure your stove is in proper working order and always make sure burners and the oven are off before you go out or go to sleep. Keep pots on the back burners, turn handles away from the front of the stove, and keep stove clean and free of grease build-up. Kitchen ventilation systems and range exhausts should be working properly. Keep flammable objects such as towels and pot holders away from the cooking area except when in use. Make sure kitchen curtains are tied back. Move cords and appliances away from the sink and hot surfaces. If extension cords are needed, install wiring guides so they do not hang over the sink, range, or working areas. Look for coffee pots, kettles and toaster ovens with automatic shut-offs. Keep a mop handy in the kitchen so you can wipe up spills instantly. You should also have a small fire extinguisher. Arrange your kitchen with frequently used items on lower shelves to avoid the need to stand on a stepstool to reach them. Make sure countertops are well-lit to avoid injuries while cutting and preparing food. In the Bathroom    Use a non-slip mat or decals in the tub and shower, since wet, soapy tile or porcelain surfaces are extremely slippery. Make sure bathroom rugs are non-skid or tape them firmly to the floor. Bathtubs should have at least one, preferably two, grab bars, firmly attached to structural supports in the wall. (Do not use built-in soap holders or glass shower doors as grab bars.)    Tub seats fitted with non-slip material on the legs allow you to wash sitting down.  For people with limited mobility, bathtub transfer benches allow you to slide safely into the tub. Raised toilet seats and toilet safety rails are helpful for those with knee or hip problems. In the [facility]    Make sure you use a nightlight and that the area around your bed is clear of potential obstacles. Be careful with electric blankets and never go to sleep with a heating pad, which can cause serious burns even if on a low setting. Use fire-resistant mattress covers and pillows, and NEVER smoke in bed. Keep a phone next to the bed that is programmed to dial 911 at the push of a button. If you have a chronic condition, you may want to sign on with an automatic call-in service. Typically the system includes a small pendant that connects directly to an emergency medical voice-response system. You should also make arrangements to stay in contact with someonefriend, neighbor, family memberon a regular schedule. Fire Prevention   According to the [employer] (Smoke Alarms for Every) [address] [place] [address], senior citizens are one of the two highest risk groups for death and serious injuries due to residential fires. When cooking, wear short-sleeved items, never a bulky long-sleeved robe. The [facility]'s Safety Checklist for Older Consumers emphasizes the importance of checking basements, garages, workshops and storage areas for fire hazards, such as volatile liquids, piles of old rags or clothing and overloaded circuits. Never smoke in bed or when lying down on a couch or recliner chair. Small portable electric or kerosene heaters are responsible for many home fires and should be used cautiously if at all. If you do use one, be sure to keep them away from flammable materials. In case of fire, make sure you have a pre-established emergency exit plan. Have a professional check your fireplace and other fuel-burning appliances yearly.     Helping Hands   Baby boomers entering the fields years will continue to see the development of new products to help older adults live safely and independently in spite of age-related changes. Making Life More Livable  , by Chiqui Barnes, lists over 1,000 products for \"living well in the mature years,\" such as bathing and mobility aids, household security devices, ergonomically designed knives and peelers, and faucet valves and knobs for temperature control. Medical supply stores and organizations are good sources of information about products that improve your quality of life and insure your safety.      Last Reviewed: November 2009 Carrol Stanford MD   Updated: 3/7/2011     ·

## 2022-05-26 NOTE — PROGRESS NOTES
Medicare Annual Wellness Visit    Singh Alexander is here for Medicare AWV (Here for Medicare AWV)    Assessment & Plan    Recommendations for Preventive Services Due: see orders and patient instructions/AVS.  Recommended screening schedule for the next 5-10 years is provided to the patient in written form: see Patient Instructions/AVS.     No follow-ups on file. Subjective       Patient's complete Health Risk Assessment and screening values have been reviewed and are found in Flowsheets. The following problems were reviewed today and where indicated follow up appointments were made and/or referrals ordered.     Positive Risk Factor Screenings with Interventions:              Health Habits/Nutrition:     Physical Activity: Inactive    Days of Exercise per Week: 0 days    Minutes of Exercise per Session: 0 min     Have you lost any weight without trying in the past 3 months?: No  Body mass index: (!) 32.56  Have you seen the dentist within the past year?: Yes    Health Habits/Nutrition Interventions:  · Nutritional issues:  educational materials for healthy, well-balanced diet provided    Hearing/Vision:  Do you or your family notice any trouble with your hearing that hasn't been managed with hearing aids?: No  Do you have difficulty driving, watching TV, or doing any of your daily activities because of your eyesight?: (!) Yes  Have you had an eye exam within the past year?: Yes  No exam data present    Hearing/Vision Interventions:  · none    Safety:  Do you have working smoke detectors?: (!) No  Do you have any tripping hazards - loose or unsecured carpets or rugs?: No  Do you have any tripping hazards - clutter in doorways, halls, or stairs?: No  Do you have either shower bars, grab bars, non-slip mats or non-slip surfaces in your shower or bathtub?: Yes  Do all of your stairways have a railing or banister?: Yes  Do you always fasten your seatbelt when you are in a car?: Yes    Safety Interventions:  · Home

## 2022-06-10 ENCOUNTER — HOSPITAL ENCOUNTER (EMERGENCY)
Age: 67
Discharge: HOME | End: 2022-06-10
Payer: MEDICARE

## 2022-06-10 VITALS
WEIGHT: 209 LBS | BODY MASS INDEX: 31.67 KG/M2 | OXYGEN SATURATION: 96 % | RESPIRATION RATE: 18 BRPM | HEIGHT: 68 IN | DIASTOLIC BLOOD PRESSURE: 90 MMHG | TEMPERATURE: 98.3 F | SYSTOLIC BLOOD PRESSURE: 175 MMHG | HEART RATE: 86 BPM

## 2022-06-10 DIAGNOSIS — H16.003 CORNEAL ULCER OF BOTH EYES: Primary | ICD-10-CM

## 2022-06-10 PROCEDURE — 99283 EMERGENCY DEPT VISIT LOW MDM: CPT

## 2022-06-10 PROCEDURE — 6370000000 HC RX 637 (ALT 250 FOR IP): Performed by: PHYSICIAN ASSISTANT

## 2022-06-10 RX ORDER — DIPHENHYDRAMINE HCL 25 MG
25 CAPSULE ORAL EVERY 4 HOURS PRN
Qty: 24 CAPSULE | Refills: 0 | Status: SHIPPED | OUTPATIENT
Start: 2022-06-10 | End: 2022-06-20

## 2022-06-10 RX ORDER — TOBRAMYCIN 3 MG/ML
2 SOLUTION/ DROPS OPHTHALMIC ONCE
Status: COMPLETED | OUTPATIENT
Start: 2022-06-10 | End: 2022-06-10

## 2022-06-10 RX ORDER — NAPROXEN 500 MG/1
500 TABLET ORAL 2 TIMES DAILY
Qty: 60 TABLET | Refills: 0 | Status: SHIPPED | OUTPATIENT
Start: 2022-06-10

## 2022-06-10 RX ORDER — DIPHENHYDRAMINE HCL 25 MG
25 TABLET ORAL
Status: COMPLETED | OUTPATIENT
Start: 2022-06-10 | End: 2022-06-10

## 2022-06-10 RX ORDER — TETRACAINE HYDROCHLORIDE 5 MG/ML
2 SOLUTION OPHTHALMIC ONCE
Status: COMPLETED | OUTPATIENT
Start: 2022-06-10 | End: 2022-06-10

## 2022-06-10 RX ORDER — NAPROXEN 500 MG/1
500 TABLET ORAL ONCE
Status: COMPLETED | OUTPATIENT
Start: 2022-06-10 | End: 2022-06-10

## 2022-06-10 RX ADMIN — FLUORESCEIN SODIUM 2 MG: 1 STRIP OPHTHALMIC at 21:25

## 2022-06-10 RX ADMIN — TOBRAMYCIN OPHTHALMIC SOLUTION 2 DROP: 3 SOLUTION/ DROPS OPHTHALMIC at 21:49

## 2022-06-10 RX ADMIN — NAPROXEN 500 MG: 500 TABLET ORAL at 21:49

## 2022-06-10 RX ADMIN — DIPHENHYDRAMINE HCL 25 MG: 25 TABLET ORAL at 21:49

## 2022-06-10 RX ADMIN — TETRACAINE HYDROCHLORIDE 2 DROP: 5 SOLUTION OPHTHALMIC at 21:25

## 2022-06-10 ASSESSMENT — PAIN DESCRIPTION - LOCATION: LOCATION: EYE

## 2022-06-10 ASSESSMENT — PAIN - FUNCTIONAL ASSESSMENT: PAIN_FUNCTIONAL_ASSESSMENT: 0-10

## 2022-06-10 ASSESSMENT — ENCOUNTER SYMPTOMS
PHOTOPHOBIA: 1
EYE REDNESS: 1
EYE DISCHARGE: 1
SHORTNESS OF BREATH: 0
COLOR CHANGE: 0
ALLERGIC/IMMUNOLOGIC NEGATIVE: 1
EYE PAIN: 1
APNEA: 0
EYE ITCHING: 1
ABDOMINAL PAIN: 0
TROUBLE SWALLOWING: 0

## 2022-06-10 ASSESSMENT — LIFESTYLE VARIABLES
HOW OFTEN DO YOU HAVE A DRINK CONTAINING ALCOHOL: 2-4 TIMES A MONTH
HOW MANY STANDARD DRINKS CONTAINING ALCOHOL DO YOU HAVE ON A TYPICAL DAY: 3 OR 4

## 2022-06-10 ASSESSMENT — PAIN SCALES - GENERAL
PAINLEVEL_OUTOF10: 9
PAINLEVEL_OUTOF10: 9

## 2022-06-10 ASSESSMENT — PAIN DESCRIPTION - DESCRIPTORS: DESCRIPTORS: BURNING

## 2022-06-11 NOTE — ED TRIAGE NOTES
PT COMES TO THE ED TODAY WITH COMPLAINTS OF NOT BEING ABLE TO SEE  PT GOT EYELINER TATTOO DONE THREE WEEKS AGO AND STATES IT DID NOT TAKE SO SHE WENT BACK TO THE [facility] SHOP TODAY AND THEY TRIED A DIFFERENT INK AND PT STATES SHE BELIEVES THAT SHE IS HAVING A REACTION TO THE DYE   PT STATES SHE CAME HOME AND LAID DOWN TO REST HER EYES AND WHEN SHE GOT UP HER EYES WILL NOT STOP WATERING AND THEY ARE BURNING   PT CALLED THE  AND THEY TOLD HER TO USE REFRESHED EYEDROPS SHE STATES ITS NOT HELPING AT ALL     PT IS TEARFUL IN TRIAGE AND STATES THAT SHE CAN NOT OPEN HER EYES

## 2022-06-11 NOTE — ED PROVIDER NOTES
PAST MEDICAL HISTORY     Past Medical History:   Diagnosis Date    Arthritis     both knees    Hyperlipidemia     meds > 10 yrs    Hypertension     meds > 4 yrs    Hyperthyroidism     Hypothyroidism     meds > 20 yrs    Palpitations 7/30/2021    Thyroid goiter          SURGICAL HISTORY       Past Surgical History:   Procedure Laterality Date    APPENDECTOMY  1981    with left oophorectomy    BREAST BIOPSY Left 2012    x 2 / both benign    CARPAL TUNNEL RELEASE Right 7/13/2021    CARPAL TUNNEL RELEASE/REPAIR RIGHT performed by Kylie Garcia MD at [address] Right 07/2021    CARPAL TUNNEL RELEASE Left 8/10/2021    CARPAL TUNNEL RELEASE/ REPAIR LEFT, PAT AT [facility] performed by Kylie Garcia MD at [address] [facility] [address]  2008    COLONOSCOPY  11-16-07    COLONOSCOPY  [id number]    Crystal Carreon (polyps)    COLONOSCOPY  2/12/16    w/polypectomy     COLONOSCOPY N/A 2/18/2019    COLORECTAL CANCER SCREENING, HIGH RISK [facility]    COLORECTAL CANCER SCREENING, HIGH RISK performed by Kike Muñoz MD at [place] [id number] Left 1/8/2020    FLEXIBLE CYSTOSCOPY LEFT DOUBLE J STENT REMOVAL (RECENT SURGERY 12/23/19) KUB ON ARRIVAL performed by Wale Ayers MD at [address] [place] [address] / [facility] / STONE Left 12/23/2019    CYSTOSCOPY LEFT RETROGRADE PYELOGRAM  LEFT DOUBLE J STENT performed by Fatemeh Harper MD at [facility]. [address], COLON, DIAGNOSTIC      HYSTERECTOMY (CERVIX STATUS UNKNOWN)  [address] REPLACEMENT  06/2019    LTKR    KNEE ARTHROSCOPY Bilateral 1987 2003 1994    left X2 and Right X1    LITHOTRIPSY Left 12/23/2019    LEFT ESWL performed by Fatemeh Harper MD at [address] Left 6/19/2019    LEFT TOTAL KNEE REPLACEMENT- DEPUY performed by Holly Morgan MD at [address] [facility] MEDICATIONS       Previous Medications    DAR 0.05 MG/24HR    Place 1 patch onto the skin Twice a Week    AMLODIPINE (NORVASC) 5 MG TABLET    TAKE 1 TABLET BY MOUTH EVERY DAY    ERYTHROMYCIN (ROMYCIN) 5 MG/GM OPHTHALMIC OINTMENT        LEVOTHYROXINE (SYNTHROID) 150 MCG TABLET    TAKE 1 TABLET BY MOUTH EVERY DAY    PRAVASTATIN (PRAVACHOL) 40 MG TABLET    TAKE 1 TABLET BY MOUTH EVERY DAY       ALLERGIES     Antiseptic products, misc. FAMILY HISTORY       Family History   Problem Relation Age of Onset    Heart Disease Mother     Cancer Mother         RENAL CELL    COPD Mother     Heart Attack Mother     Diabetes Other     Breast Cancer Other     Colon Cancer Father     Macular Degen Father     Cancer Brother         prostate cancer    No Known Problems Son           SOCIAL HISTORY       Social History     Socioeconomic History    Marital status:      Spouse name: None    Number of children: None    Years of education: None    Highest education level: None   Occupational History    None   Tobacco Use    Smoking status: Former Smoker     Packs/day: 0.25     Years: 5.00     Pack years: 1.25     Types: Cigarettes     Quit date:      Years since quittin.4    Smokeless tobacco: Never Used   Vaping Use    Vaping Use: Never used   Substance and Sexual Activity    Alcohol use: Yes     Comment: occ.  Drug use: No    Sexual activity: None   Other Topics Concern    None   Social History Narrative    None     Social Determinants of Health     Financial Resource Strain: Low Risk     Difficulty of Paying Living Expenses: Not hard at all   Food Insecurity: No Food Insecurity    Worried About Running Out of Food in the Last Year: Never true    Ruba of Food in the Last Year: Never true   Transportation Needs: No Transportation Needs    Lack of Transportation (Medical): No    Lack of Transportation (Non-Medical):  No   Physical Activity: Inactive    Days of Exercise per Week: 0 days  Minutes of Exercise per Session: 0 min   Stress:     Feeling of Stress : Not on file   Social Connections:     Frequency of Communication with Friends and Family: Not on file    Frequency of Social Gatherings with Friends and Family: Not on file    Attends Restorationist Services: Not on file    Active Member of [address] [place] [address] or Organizations: Not on file    Attends Club or Organization Meetings: Not on file    Marital Status: Not on file   Intimate Partner Violence:     Fear of Current or Ex-Partner: Not on file    Emotionally Abused: Not on file    Physically Abused: Not on file    Sexually Abused: Not on file   Housing Stability:     Unable to Pay for Housing in the Last Year: Not on file    Number of [place] in the Last Year: Not on file    Unstable Housing in the Last Year: Not on file       SCREENINGS    Amo Coma Scale  Eye Opening: Spontaneous  Best Verbal Response: Oriented  Best Motor Response: Obeys commands  Adriana Coma Scale Score: 15      PHYSICAL EXAM    (up to 7 forlevel 4, 8 or more for level 5)     ED Triage Vitals [06/10/22 2109]   BP Temp Temp Source Heart Rate Resp SpO2 Height Weight   (!) 175/90 98.3 °F (36.8 °C) Oral 86 18 96 % 5' 8\" (1.727 m) 209 lb (94.8 kg)       Physical Exam  Vitals and nursing note reviewed. Constitutional:       General: She is not in acute distress. Appearance: She is well-developed. She is not diaphoretic. HENT:      Head: Normocephalic and atraumatic. Mouth/Throat:      Pharynx: No oropharyngeal exudate. Eyes:      General: No scleral icterus. Right eye: No discharge. Left eye: Discharge present. Conjunctiva/sclera:      Right eye: Right conjunctiva is injected. Left eye: Left conjunctiva is injected. Pupils: Pupils are equal, round, and reactive to light. Neck:      Trachea: No tracheal deviation. Cardiovascular:      Rate and Rhythm: Normal rate. Heart sounds: Normal heart sounds.    Pulmonary: Effort: Pulmonary effort is normal. No respiratory distress. Breath sounds: Normal breath sounds. Abdominal:      General: Bowel sounds are normal. There is no distension. Palpations: Abdomen is soft. Musculoskeletal:         General: Normal range of motion. Cervical back: Normal range of motion and neck supple. Skin:     General: Skin is warm and dry. Findings: No erythema or rash. Neurological:      Mental Status: She is alert and oriented to person, place, and time. Cranial Nerves: No cranial nerve deficit. Motor: No abnormal muscle tone. Psychiatric:         Behavior: Behavior normal.         Thought Content: Thought content normal.         Judgment: Judgment normal.           DIAGNOSTIC RESULTS     RADIOLOGY:   Non-plain film images such as CT, Ultrasound and MRI are read by the radiologist. Plain radiographic images are visualized and preliminarilyinterpreted by Garrett Weldon PA-C with the below findings:      Interpretation per the Radiologist below, if available at the time of this note:    No orders to display       LABS:  Labs Reviewed - No data to display    All other labs were within normal range or not returnedas of this dictation. EMERGENCYDEPARTMENT COURSE and DIFFERENTIAL DIAGNOSIS/MDM:   Vitals:    Vitals:    06/10/22 2109   BP: (!) 175/90   Pulse: 86   Resp: 18   Temp: 98.3 °F (36.8 °C)   TempSrc: Oral   SpO2: 96%   Weight: 209 lb (94.8 kg)   Height: 5' 8\" (1.727 m)       REASSESSMENT      Patient seen by myself and Dr. Wallace Sham  Patient presented to the emergency department with bilateral eye pain following a tattooing procedure earlier today. Patient was examined with fluorescein which showed fluorescein dye uptake in circular patterns overlying the pupils, left greater than right. Patient states normal vision after tetracaine drops and denies any true loss of vision.   Patient will be treated with topical antibiotic eyedrops and close ophthalmology follow-up. Patient was advised that if it anytime she is to have worsening or loss of vision she is to return to the emergency department immediately    MDM    PROCEDURES:    Procedures      FINAL IMPRESSION      1.  Corneal ulcer of both eyes          DISPOSITION/PLAN   DISPOSITION Discharge - Pending Orders Complete 06/10/2022 09:49:10 PM      PATIENT REFERRED TO:  [facility] ([facility])  [address] [facility] [address] [facility]  [phone]    Call in 1 day      [facility]) ED  [address] [facility]  [phone]    As needed, If symptoms worsen      DISCHARGE MEDICATIONS:  New Prescriptions    DIPHENHYDRAMINE (BENADRYL) 25 MG CAPSULE    Take 1 capsule by mouth every 4 hours as needed for Itching    NAPROXEN (NAPROSYN) 500 MG TABLET    Take 1 tablet by mouth 2 times daily       (Please note that portions of this note were completed with a voice recognition program.  Efforts were made to edit the dictations but occasionally words are mis-transcribed.)    DONTE Amador PA-C  06/10/22 7153

## 2022-06-15 RX ORDER — AMLODIPINE BESYLATE 5 MG/1
TABLET ORAL
Qty: 90 TABLET | Refills: 1 | Status: SHIPPED | OUTPATIENT
Start: 2022-06-15

## 2022-06-15 RX ORDER — PRAVASTATIN SODIUM 40 MG
TABLET ORAL
Qty: 90 TABLET | Refills: 1 | Status: SHIPPED | OUTPATIENT
Start: 2022-06-15

## 2022-07-14 RX ORDER — LEVOTHYROXINE SODIUM 0.15 MG/1
TABLET ORAL
Qty: 90 TABLET | Refills: 2 | Status: SHIPPED | OUTPATIENT
Start: 2022-07-14

## 2022-07-27 DIAGNOSIS — N20.0 KIDNEY STONE: Primary | ICD-10-CM

## 2022-07-28 ENCOUNTER — OFFICE VISIT (OUTPATIENT)
Dept: UROLOGY | Age: 67
End: 2022-07-28
Payer: MEDICARE

## 2022-07-28 ENCOUNTER — HOSPITAL ENCOUNTER (OUTPATIENT)
Dept: GENERAL RADIOLOGY | Age: 67
Discharge: HOME | End: 2022-07-30
Payer: MEDICARE

## 2022-07-28 VITALS
DIASTOLIC BLOOD PRESSURE: 86 MMHG | BODY MASS INDEX: 32.13 KG/M2 | WEIGHT: 212 LBS | HEIGHT: 68 IN | SYSTOLIC BLOOD PRESSURE: 136 MMHG | HEART RATE: 93 BPM

## 2022-07-28 DIAGNOSIS — N20.0 KIDNEY STONE ON LEFT SIDE: Primary | ICD-10-CM

## 2022-07-28 DIAGNOSIS — N20.0 KIDNEY STONE: ICD-10-CM

## 2022-07-28 PROCEDURE — 99214 OFFICE O/P EST MOD 30 MIN: CPT | Performed by: UROLOGY

## 2022-07-28 PROCEDURE — G8417 CALC BMI ABV UP PARAM F/U: HCPCS | Performed by: UROLOGY

## 2022-07-28 PROCEDURE — 1123F ACP DISCUSS/DSCN MKR DOCD: CPT | Performed by: UROLOGY

## 2022-07-28 PROCEDURE — 1036F TOBACCO NON-USER: CPT | Performed by: UROLOGY

## 2022-07-28 PROCEDURE — G8399 PT W/DXA RESULTS DOCUMENT: HCPCS | Performed by: UROLOGY

## 2022-07-28 PROCEDURE — 1090F PRES/ABSN URINE INCON ASSESS: CPT | Performed by: UROLOGY

## 2022-07-28 PROCEDURE — 74018 RADEX ABDOMEN 1 VIEW: CPT

## 2022-07-28 PROCEDURE — G8427 DOCREV CUR MEDS BY ELIG CLIN: HCPCS | Performed by: UROLOGY

## 2022-07-28 PROCEDURE — 3017F COLORECTAL CA SCREEN DOC REV: CPT | Performed by: UROLOGY

## 2022-07-28 ASSESSMENT — ENCOUNTER SYMPTOMS
ABDOMINAL PAIN: 0
ABDOMINAL DISTENTION: 0

## 2022-08-12 ENCOUNTER — TELEPHONE (OUTPATIENT)
Dept: UROLOGY | Age: 67
End: 2022-08-12

## 2022-08-12 DIAGNOSIS — N20.0 KIDNEY STONE: Primary | ICD-10-CM

## 2022-08-13 ENCOUNTER — HOSPITAL ENCOUNTER (OUTPATIENT)
Dept: CT IMAGING | Age: 67
Discharge: HOME | End: 2022-08-15
Payer: MEDICARE

## 2022-08-13 DIAGNOSIS — N20.0 KIDNEY STONE: ICD-10-CM

## 2022-08-13 PROCEDURE — 74150 CT ABDOMEN W/O CONTRAST: CPT

## 2022-08-31 ENCOUNTER — OFFICE VISIT (OUTPATIENT)
Dept: UROLOGY | Age: 67
End: 2022-08-31
Payer: MEDICARE

## 2022-08-31 VITALS
WEIGHT: 210 LBS | DIASTOLIC BLOOD PRESSURE: 82 MMHG | HEIGHT: 68 IN | BODY MASS INDEX: 31.83 KG/M2 | HEART RATE: 84 BPM | SYSTOLIC BLOOD PRESSURE: 138 MMHG

## 2022-08-31 DIAGNOSIS — N20.0 KIDNEY STONE ON LEFT SIDE: Primary | ICD-10-CM

## 2022-08-31 PROCEDURE — 1090F PRES/ABSN URINE INCON ASSESS: CPT | Performed by: UROLOGY

## 2022-08-31 PROCEDURE — 1123F ACP DISCUSS/DSCN MKR DOCD: CPT | Performed by: UROLOGY

## 2022-08-31 PROCEDURE — G8399 PT W/DXA RESULTS DOCUMENT: HCPCS | Performed by: UROLOGY

## 2022-08-31 PROCEDURE — 3017F COLORECTAL CA SCREEN DOC REV: CPT | Performed by: UROLOGY

## 2022-08-31 PROCEDURE — G8427 DOCREV CUR MEDS BY ELIG CLIN: HCPCS | Performed by: UROLOGY

## 2022-08-31 PROCEDURE — 1036F TOBACCO NON-USER: CPT | Performed by: UROLOGY

## 2022-08-31 PROCEDURE — 99214 OFFICE O/P EST MOD 30 MIN: CPT | Performed by: UROLOGY

## 2022-08-31 PROCEDURE — G8417 CALC BMI ABV UP PARAM F/U: HCPCS | Performed by: UROLOGY

## 2022-08-31 ASSESSMENT — ENCOUNTER SYMPTOMS: ABDOMINAL PAIN: 0

## 2022-08-31 NOTE — PROGRESS NOTES
Subjective:      Patient ID: Corrina Phalen is a 78 y.o. female    HPI  This is a 79 yo female with h/o HTN, hypothyroidism and elevated cholesterol and with Lt 1 cm renal stone s/p Lt ESWL with stent on 12/23/19 and then stent removal. Since last seen on 7/28/22, she had a KUB that suggested more stone burden and Ct was done and she is back to review. She has no specific symptoms including no pain, no hematuria or UTI's. She is going out of the country for a week on 10/22. I reviewed the CT on PACS and the stone is 1.3 cm with higher HU in the Lt renal pelvis with some fullness. There are other smaller stones in Lt LP as well. She has no other complaints.        Past Medical History:   Diagnosis Date    Arthritis     both knees    Hyperlipidemia     meds > 10 yrs    Hypertension     meds > 4 yrs    Hyperthyroidism     Hypothyroidism     meds > 20 yrs    Palpitations 7/30/2021    Thyroid goiter      Past Surgical History:   Procedure Laterality Date    APPENDECTOMY  1981    with left oophorectomy    BREAST BIOPSY Left 2012    x 2 / both benign    CARPAL TUNNEL RELEASE Right 07/13/2021    CARPAL TUNNEL RELEASE/REPAIR RIGHT performed by Cheryle Bair, MD at [address] [place] [address] Right 07/2021    CARPAL TUNNEL RELEASE Left 08/10/2021    CARPAL TUNNEL RELEASE/ REPAIR LEFT, PAT AT [facility] performed by Cheryle Bair, MD at [address] [place] [address]  2008    COLONOSCOPY  11/16/2007    COLONOSCOPY  01/07/2013    Yadira Carreon (polyps)    COLONOSCOPY  02/12/2016    w/polypectomy     COLONOSCOPY N/A 02/18/2019    COLORECTAL CANCER SCREENING, HIGH RISK performed by Nathalie Gan MD at [facility]    COLONOSCOPY N/A 02/21/2022    COLORECTAL CANCER SCREENING, HIGH RISK performed by Julio Godfrey MD at [address] [facility] Left 01/08/2020    FLEXIBLE CYSTOSCOPY LEFT DOUBLE J [address] (RECENT SURGERY 12/23/19) KUB ON ARRIVAL performed by Dylon Fernandez MD at [facility]    CYSTOSCOPY INSERTION / REMOVAL STENT / STONE Left 2019    CYSTOSCOPY LEFT RETROGRADE PYELOGRAM  LEFT DOUBLE J STENT performed by Viki Rubin MD at [address] [facility], COLON, DIAGNOSTIC      HYSTERECTOMY (CERVIX STATUS UNKNOWN)  1998    JOINT REPLACEMENT  2019    LTKR    KNEE ARTHROSCOPY Bilateral 1987    left X2 and Right X1    LITHOTRIPSY Left 2019    LEFT ESWL performed by Viki Rubin MD at [address] [place] [address] [address] Left 2019    LEFT TOTAL KNEE REPLACEMENT- [address] performed by Hernan Joshi MD at [address] [place] [address] [address] Right 2022     Social History     Socioeconomic History    Marital status:      Spouse name: None    Number of children: None    Years of education: None    Highest education level: None   Tobacco Use    Smoking status: Former     Packs/day: 0.25     Years: 5.00     Pack years: 1.25     Types: Cigarettes     Quit date:      Years since quittin.6    Smokeless tobacco: Never   Vaping Use    Vaping Use: Never used   Substance and Sexual Activity    Alcohol use: Yes     Comment: occ.     Drug use: No     Social Determinants of Health     Physical Activity: Inactive    Days of Exercise per Week: 0 days    Minutes of Exercise per Session: 0 min     Family History   Problem Relation Age of Onset    Heart Disease Mother     Cancer Mother         RENAL CELL    COPD Mother     Heart Attack Mother     Diabetes Other     Breast Cancer Other     Colon Cancer Father     Macular Degen Father     Cancer Brother         prostate cancer    No Known Problems Son      Current Outpatient Medications   Medication Sig Dispense Refill    levothyroxine (SYNTHROID) 150 MCG tablet TAKE 1 TABLET BY MOUTH EVERY DAY 90 tablet 2    pravastatin (PRAVACHOL) 40 MG tablet TAKE 1 TABLET BY MOUTH EVERY DAY 90 tablet 1    amLODIPine (NORVASC) 5 MG tablet TAKE 1 TABLET BY MOUTH EVERY DAY 90 tablet 1    naproxen (NAPROSYN) 500 MG tablet Take 1 tablet by mouth 2 times daily 60 tablet 0    DAR 0.05 MG/24HR Place 1 patch onto the skin Twice a Week 24 patch 3    erythromycin (ROMYCIN) 5 MG/GM ophthalmic ointment        No current facility-administered medications for this visit. Antiseptic products, misc.  reviewed      Review of Systems   Constitutional:  Negative for fever. Gastrointestinal:  Negative for abdominal pain. Genitourinary:  Negative for dysuria, flank pain and hematuria. Objective:   Physical Exam  Abdominal:      General: There is no distension. Neurological:      Mental Status: She is alert. Psychiatric:         Mood and Affect: Mood normal.        Assessment: This is a 79 yo female with h/o HTN, hypothyroidism and elevated cholesterol and s/p prior Lt ESWL for 1 cm Lt renal stone (2019) with LP stones and larger 1.3 cm stone in RP with higher HU. I reviewed the treatment options inclduing ESWL withs tent vs URS with laser and stent vs PCNL and she wants to consider the one treatment that may result in best stone free rate and minimize need for a stent given her upcoming trip out of country. I expalined this may be PCNL and she wants referral for this option. All questions were answered.        Plan:      Refer to Dr Can Rehman for possible Lt PCNL  F/U as scheduled        Bj Fuentes MD

## 2022-09-30 DIAGNOSIS — E03.9 HYPOTHYROIDISM, UNSPECIFIED TYPE: ICD-10-CM

## 2022-09-30 LAB
T4 FREE: 1.62 NG/DL (ref 0.84–1.68)
TSH REFLEX: 0.59 UIU/ML (ref 0.44–3.86)

## 2022-10-07 ENCOUNTER — OFFICE VISIT (OUTPATIENT)
Dept: ENDOCRINOLOGY | Age: 67
End: 2022-10-07
Payer: MEDICARE

## 2022-10-07 VITALS
SYSTOLIC BLOOD PRESSURE: 117 MMHG | WEIGHT: 207 LBS | OXYGEN SATURATION: 96 % | BODY MASS INDEX: 31.37 KG/M2 | DIASTOLIC BLOOD PRESSURE: 77 MMHG | HEIGHT: 68 IN | HEART RATE: 86 BPM

## 2022-10-07 DIAGNOSIS — E03.9 HYPOTHYROIDISM, UNSPECIFIED TYPE: Primary | ICD-10-CM

## 2022-10-07 PROCEDURE — G8484 FLU IMMUNIZE NO ADMIN: HCPCS | Performed by: INTERNAL MEDICINE

## 2022-10-07 PROCEDURE — G8427 DOCREV CUR MEDS BY ELIG CLIN: HCPCS | Performed by: INTERNAL MEDICINE

## 2022-10-07 PROCEDURE — 99213 OFFICE O/P EST LOW 20 MIN: CPT | Performed by: INTERNAL MEDICINE

## 2022-10-07 PROCEDURE — 1036F TOBACCO NON-USER: CPT | Performed by: INTERNAL MEDICINE

## 2022-10-07 PROCEDURE — 1123F ACP DISCUSS/DSCN MKR DOCD: CPT | Performed by: INTERNAL MEDICINE

## 2022-10-07 PROCEDURE — G8399 PT W/DXA RESULTS DOCUMENT: HCPCS | Performed by: INTERNAL MEDICINE

## 2022-10-07 PROCEDURE — 1090F PRES/ABSN URINE INCON ASSESS: CPT | Performed by: INTERNAL MEDICINE

## 2022-10-07 PROCEDURE — G8417 CALC BMI ABV UP PARAM F/U: HCPCS | Performed by: INTERNAL MEDICINE

## 2022-10-07 PROCEDURE — 3017F COLORECTAL CA SCREEN DOC REV: CPT | Performed by: INTERNAL MEDICINE

## 2022-10-07 RX ORDER — ACETAMINOPHEN 500 MG
1000 TABLET ORAL EVERY 6 HOURS PRN
COMMUNITY
Start: 2022-09-20

## 2022-10-07 ASSESSMENT — ENCOUNTER SYMPTOMS: EYES NEGATIVE: 1

## 2022-10-07 NOTE — PROGRESS NOTES
10/7/2022    Assessment:       Diagnosis Orders   1. Hypothyroidism, unspecified type              PLAN:     Continue synthroid 150 mcg daily   Orders Placed This Encounter   Procedures    TSH with Reflex     Standing Status:   Future     Standing Expiration Date:   10/7/2023    T4, Free     Standing Status:   Future     Standing Expiration Date:   10/7/2023     Subjective:     Chief Complaint   Patient presents with    Hypothyroidism     Vitals:    10/07/22 0856   BP: 117/77   Site: Left Upper Arm   Position: Sitting   Cuff Size: Large Adult   Pulse: 86   SpO2: 96%   Weight: 207 lb (93.9 kg)   Height: 5' 8\" (1.727 m)     Wt Readings from Last 3 Encounters:   10/07/22 207 lb (93.9 kg)   08/31/22 210 lb (95.3 kg)   07/28/22 212 lb (96.2 kg)     BP Readings from Last 3 Encounters:   10/07/22 117/77   08/31/22 138/82   07/28/22 136/86     6 month f/u on hypothyroidism labs stable reviewed hashimoto thyroiditis     Thyroid Problem  Presents for follow-up visit. Patient reports no cold intolerance or heat intolerance. The symptoms have been stable.    Past Medical History:   Diagnosis Date    Arthritis     both knees    Hyperlipidemia     meds > 10 yrs    Hypertension     meds > 4 yrs    Hyperthyroidism     Hypothyroidism     meds > 20 yrs    Palpitations 7/30/2021    Thyroid goiter      Past Surgical History:   Procedure Laterality Date    APPENDECTOMY  1981    with left oophorectomy    BREAST BIOPSY Left 2012    x 2 / both benign    CARPAL TUNNEL RELEASE Right 07/13/2021    CARPAL TUNNEL RELEASE/REPAIR RIGHT performed by Mady Estrada MD at [place] [address] Right 07/2021    CARPAL TUNNEL RELEASE Left 08/10/2021    CARPAL TUNNEL RELEASE/ REPAIR LEFT, PAT AT [facility] performed by Mady Estrada MD at [address] [place] [address]  2008    COLONOSCOPY  11/16/2007    COLONOSCOPY  01/07/2013    Horacio Carreon (polyps)    COLONOSCOPY  02/12/2016    w/polypectomy     COLONOSCOPY N/A 2019    COLORECTAL CANCER SCREENING, HIGH RISK performed by Dariana Murphy MD at [facility]    COLONOSCOPY N/A 2022    COLORECTAL CANCER SCREENING, HIGH RISK performed by Frieda Rangel MD at [address] [facility] Left 2020    FLEXIBLE CYSTOSCOPY LEFT DOUBLE J [address] (RECENT SURGERY 19) KUB ON ARRIVAL performed by Dominick Alan MD at [address] [facility]    CYSTOSCOPY LEFT RETROGRADE PYELOGRAM  LEFT DOUBLE J STENT performed by Judy Young MD at [address] [facility], COLON, DIAGNOSTIC      HYSTERECTOMY (CERVIX STATUS UNKNOWN)      JOINT REPLACEMENT  2019    LTKR    KNEE ARTHROSCOPY Bilateral 1987    left X2 and Right X1    LITHOTRIPSY Left 2019    LEFT ESWL performed by Judy Young MD at [address] Left 2019    LEFT TOTAL KNEE REPLACEMENT- DEPUY performed by Jonathon Pereyra MD at [address] Right 2022     Social History     Socioeconomic History    Marital status:      Spouse name: Not on file    Number of children: Not on file    Years of education: Not on file    Highest education level: Not on file   Occupational History    Not on file   Tobacco Use    Smoking status: Former     Packs/day: 0.25     Years: 5.00     Pack years: 1.25     Types: Cigarettes     Quit date: 0     Years since quittin.7    Smokeless tobacco: Never   Vaping Use    Vaping Use: Never used   Substance and Sexual Activity    Alcohol use: Yes     Comment: occ.     Drug use: No    Sexual activity: Not on file   Other Topics Concern    Not on file   Social History Narrative    Not on file     Social Determinants of Health     Financial Resource Strain: Not on file   Food Insecurity: Not on file   Transportation Needs: Not on file   Physical Activity: Inactive    Days of Exercise per Week: 0 days    Minutes of Exercise per Session: 0 min   Stress: Not on file   Social Connections: Not on file   Intimate Partner Violence: Not on file   Housing Stability: Not on file     Family History   Problem Relation Age of Onset    Heart Disease Mother     Cancer Mother         RENAL CELL    COPD Mother     Heart Attack Mother     Diabetes Other     Breast Cancer Other     Colon Cancer Father     Macular Degen Father     Cancer Brother         prostate cancer    No Known Problems Son      Allergies   Allergen Reactions    Antiseptic Products, Misc.  Rash     duraprep       Current Outpatient Medications:     acetaminophen (TYLENOL) 500 MG tablet, Take 1,000 mg by mouth every 6 hours as needed, Disp: , Rfl:     levothyroxine (SYNTHROID) 150 MCG tablet, TAKE 1 TABLET BY MOUTH EVERY DAY, Disp: 90 tablet, Rfl: 2    pravastatin (PRAVACHOL) 40 MG tablet, TAKE 1 TABLET BY MOUTH EVERY DAY, Disp: 90 tablet, Rfl: 1    amLODIPine (NORVASC) 5 MG tablet, TAKE 1 TABLET BY MOUTH EVERY DAY, Disp: 90 tablet, Rfl: 1    naproxen (NAPROSYN) 500 MG tablet, Take 1 tablet by mouth 2 times daily, Disp: 60 tablet, Rfl: 0    DAR 0.05 MG/24HR, Place 1 patch onto the skin Twice a Week, Disp: 24 patch, Rfl: 3    erythromycin (ROMYCIN) 5 MG/GM ophthalmic ointment, , Disp: , Rfl:   Lab Results   Component Value Date     04/15/2022    K 4.2 04/15/2022     04/15/2022    CO2 21 04/02/2022    BUN 16 04/02/2022    CREATININE 0.98 04/15/2022    GLUCOSE 152 (H) 04/15/2022    CALCIUM 8.5 (L) 04/15/2022    PROT 7.0 04/02/2022    LABALBU 4.4 04/02/2022    BILITOT 0.4 04/02/2022    ALKPHOS 66 04/02/2022    AST 18 04/02/2022    ALT 25 04/02/2022    LABGLOM >60.0 04/02/2022    GFRAA >60.0 04/02/2022    GLOB 2.6 04/02/2022     Lab Results   Component Value Date    WBC 16.2 (H) 04/15/2022    HGB 12.1 04/15/2022    HCT 37.1 04/15/2022    MCV 91 04/15/2022     04/15/2022     Lab Results   Component Value Date    LABA1C 5.7 03/31/2022    LABA1C 5.9 03/05/2021    LABA1C 5.8 02/26/2020     Lab Results   Component Value Date    HDL 33 (L) 04/02/2022    HDL 31 (L) 03/05/2021    HDL 35 (L) 02/26/2020    LDLCALC 101 04/02/2022    LDLCALC 109 03/05/2021    LDLCALC 104 02/26/2020    CHOL 159 04/02/2022    CHOL 184 03/05/2021    CHOL 164 02/26/2020    TRIG 124 04/02/2022    TRIG 221 (H) 03/05/2021    TRIG 126 02/26/2020     No results found for: TESTM  Lab Results   Component Value Date    TSH 3.580 06/09/2021    TSH 5.480 (H) 12/03/2020    TSH 4.560 (H) 02/26/2020    TSHREFLEX 0.595 09/30/2022    TSHREFLEX 0.918 04/02/2022    TSHREFLEX 0.747 09/03/2021    T4FREE 1.62 09/30/2022    T4FREE 1.26 04/02/2022    T4FREE 1.51 09/03/2021     Lab Results   Component Value Date    TPOABS 419.0 (H) 09/03/2021       Review of Systems   Eyes: Negative. Endocrine: Negative. Negative for cold intolerance and heat intolerance. All other systems reviewed and are negative. Objective:   Physical Exam  Vitals reviewed. Constitutional:       General: She is not in acute distress. Appearance: Normal appearance. She is obese. HENT:      Head: Normocephalic and atraumatic. Right Ear: External ear normal.      Left Ear: External ear normal.      Nose: Nose normal.   Eyes:      General: No scleral icterus. Right eye: No discharge. Left eye: No discharge. Extraocular Movements: Extraocular movements intact. Conjunctiva/sclera: Conjunctivae normal.   Cardiovascular:      Rate and Rhythm: Normal rate. Pulmonary:      Effort: Pulmonary effort is normal.   Musculoskeletal:         General: Normal range of motion. Cervical back: Normal range of motion and neck supple. Neurological:      General: No focal deficit present. Mental Status: She is alert and oriented to person, place, and time.    Psychiatric:         Mood and Affect: Mood normal.         Behavior: Behavior normal.

## 2022-11-15 DIAGNOSIS — Z12.31 BREAST CANCER SCREENING BY MAMMOGRAM: Primary | ICD-10-CM

## 2022-12-06 ENCOUNTER — OFFICE VISIT (OUTPATIENT)
Dept: FAMILY MEDICINE CLINIC | Age: 67
End: 2022-12-06
Payer: MEDICARE

## 2022-12-06 VITALS
OXYGEN SATURATION: 97 % | HEART RATE: 83 BPM | DIASTOLIC BLOOD PRESSURE: 80 MMHG | WEIGHT: 212 LBS | RESPIRATION RATE: 14 BRPM | HEIGHT: 68 IN | BODY MASS INDEX: 32.13 KG/M2 | SYSTOLIC BLOOD PRESSURE: 136 MMHG

## 2022-12-06 DIAGNOSIS — R10.11 RIGHT UPPER QUADRANT ABDOMINAL PAIN: ICD-10-CM

## 2022-12-06 DIAGNOSIS — E78.5 HYPERLIPIDEMIA, UNSPECIFIED HYPERLIPIDEMIA TYPE: ICD-10-CM

## 2022-12-06 DIAGNOSIS — E03.9 HYPOTHYROIDISM, UNSPECIFIED TYPE: Primary | ICD-10-CM

## 2022-12-06 PROCEDURE — 3017F COLORECTAL CA SCREEN DOC REV: CPT | Performed by: INTERNAL MEDICINE

## 2022-12-06 PROCEDURE — G8484 FLU IMMUNIZE NO ADMIN: HCPCS | Performed by: INTERNAL MEDICINE

## 2022-12-06 PROCEDURE — 3074F SYST BP LT 130 MM HG: CPT | Performed by: INTERNAL MEDICINE

## 2022-12-06 PROCEDURE — 1090F PRES/ABSN URINE INCON ASSESS: CPT | Performed by: INTERNAL MEDICINE

## 2022-12-06 PROCEDURE — 1036F TOBACCO NON-USER: CPT | Performed by: INTERNAL MEDICINE

## 2022-12-06 PROCEDURE — G8417 CALC BMI ABV UP PARAM F/U: HCPCS | Performed by: INTERNAL MEDICINE

## 2022-12-06 PROCEDURE — G8427 DOCREV CUR MEDS BY ELIG CLIN: HCPCS | Performed by: INTERNAL MEDICINE

## 2022-12-06 PROCEDURE — G8399 PT W/DXA RESULTS DOCUMENT: HCPCS | Performed by: INTERNAL MEDICINE

## 2022-12-06 PROCEDURE — 1123F ACP DISCUSS/DSCN MKR DOCD: CPT | Performed by: INTERNAL MEDICINE

## 2022-12-06 PROCEDURE — 3078F DIAST BP <80 MM HG: CPT | Performed by: INTERNAL MEDICINE

## 2022-12-06 PROCEDURE — 99214 OFFICE O/P EST MOD 30 MIN: CPT | Performed by: INTERNAL MEDICINE

## 2022-12-06 SDOH — ECONOMIC STABILITY: FOOD INSECURITY: WITHIN THE PAST 12 MONTHS, THE FOOD YOU BOUGHT JUST DIDN'T LAST AND YOU DIDN'T HAVE MONEY TO GET MORE.: NEVER TRUE

## 2022-12-06 SDOH — ECONOMIC STABILITY: FOOD INSECURITY: WITHIN THE PAST 12 MONTHS, YOU WORRIED THAT YOUR FOOD WOULD RUN OUT BEFORE YOU GOT MONEY TO BUY MORE.: NEVER TRUE

## 2022-12-06 ASSESSMENT — ENCOUNTER SYMPTOMS
VOMITING: 0
SINUS PAIN: 0
EYE ITCHING: 0
DIARRHEA: 0
FACIAL SWELLING: 0
EYE DISCHARGE: 0
BLOOD IN STOOL: 0
SINUS PRESSURE: 0
COLOR CHANGE: 0
NAUSEA: 0
APNEA: 0
BACK PAIN: 0
CONSTIPATION: 0
RHINORRHEA: 0
EYE PAIN: 0
PHOTOPHOBIA: 0
WHEEZING: 0
ABDOMINAL DISTENTION: 0
VOICE CHANGE: 0
CHEST TIGHTNESS: 0
SHORTNESS OF BREATH: 0
RECTAL PAIN: 0
TROUBLE SWALLOWING: 0
SORE THROAT: 0
EYE REDNESS: 0
ABDOMINAL PAIN: 0
COUGH: 0

## 2022-12-06 ASSESSMENT — SOCIAL DETERMINANTS OF HEALTH (SDOH): HOW HARD IS IT FOR YOU TO PAY FOR THE VERY BASICS LIKE FOOD, HOUSING, MEDICAL CARE, AND HEATING?: NOT HARD AT ALL

## 2022-12-06 NOTE — PROGRESS NOTES
2022    Reva Buck (:  1955) is a 78 y.o. female, here for evaluation of the following medical concerns:    Hypertension  Pertinent negatives include no chest pain, headaches, neck pain, palpitations or shortness of breath. 73-year-old female with a history of essential hypertension, hypothyroidism and hyperlipidemia ramone Hurtado is s/p TRKR in 2 weeks. She voices no complaints. Right upper quadrant pain: Well-controlled at this time. Dyspnea: Stable at this time. Hyperglycemia: Hemoglobin A1c was obtained on 2020. It was 5.8. It is 5.9 today. Hypertension:compliant with Norvasc. BP elevated today. Menopausal symptoms: The patient symptoms are well controlled via estradiol which she takes        Hyperlipidemia: In regards to her hyperlipidemia the patient is compliant with pravastatin 40 mg orally daily        Hypothyroidism: In regards to her hypothyroidism the patient is compliant with Synthroid 150 mcg daily      Obesity: The patient's body mass index is presently 33. 18. Review of Systems   Constitutional:  Negative for chills, diaphoresis, fatigue and fever. HENT:  Negative for congestion, dental problem, drooling, ear discharge, ear pain, facial swelling, hearing loss, mouth sores, nosebleeds, postnasal drip, rhinorrhea, sinus pressure, sinus pain, sneezing, sore throat, tinnitus, trouble swallowing and voice change. Eyes:  Negative for photophobia, pain, discharge, redness, itching and visual disturbance. Respiratory:  Negative for apnea, cough, chest tightness, shortness of breath and wheezing. Cardiovascular:  Negative for chest pain, palpitations and leg swelling. Gastrointestinal:  Negative for abdominal distention, abdominal pain, blood in stool, constipation, diarrhea, nausea, rectal pain and vomiting. Endocrine: Negative for cold intolerance, heat intolerance, polydipsia, polyphagia and polyuria.    Genitourinary: Negative for decreased urine volume, difficulty urinating, dysuria, flank pain, frequency, genital sores, hematuria and urgency. Musculoskeletal:  Negative for arthralgias, back pain, gait problem, joint swelling, myalgias, neck pain and neck stiffness. Skin:  Negative for color change, rash and wound. Allergic/Immunologic: Negative for environmental allergies and food allergies. Neurological:  Negative for dizziness, tremors, seizures, syncope, facial asymmetry, speech difficulty, weakness, light-headedness, numbness and headaches. Hematological:  Negative for adenopathy. Does not bruise/bleed easily. Psychiatric/Behavioral:  Negative for agitation, confusion, decreased concentration, hallucinations, self-injury, sleep disturbance and suicidal ideas. The patient is not nervous/anxious. Prior to Visit Medications    Medication Sig Taking? Authorizing Provider   acetaminophen (TYLENOL) 500 MG tablet Take 1,000 mg by mouth every 6 hours as needed  Historical Provider, MD   levothyroxine (SYNTHROID) 150 MCG tablet TAKE 1 TABLET BY MOUTH EVERY DAY  Jose Alejandro Zimmerman MD   pravastatin (PRAVACHOL) 40 MG tablet TAKE 1 TABLET BY MOUTH EVERY DAY  Jose Alejandro Zimmerman MD   amLODIPine (NORVASC) 5 MG tablet TAKE 1 TABLET BY MOUTH EVERY DAY  Rafaela Osorio MD   naproxen (NAPROSYN) 500 MG tablet Take 1 tablet by mouth 2 times daily  Susan Jeffries PA-C   DAR 0.05 MG/24HR Place 1 patch onto the skin Twice a Week  DO genesis Sigala [facility]) 5 MG/GM ophthalmic ointment   Historical Provider, MD        Allergies   Allergen Reactions    Antiseptic Products, Misc.  Rash     duraprep       Past Medical History:   Diagnosis Date    Arthritis     both knees    Hyperlipidemia     meds > 10 yrs    Hypertension     meds > 4 yrs    Hyperthyroidism     Hypothyroidism     meds > 20 yrs    Palpitations 7/30/2021    Thyroid goiter        Past Surgical History:   Procedure Laterality Date    APPENDECTOMY  1981    with left oophorectomy    BREAST BIOPSY Left 2012    x 2 / both benign    CARPAL TUNNEL RELEASE Right 07/13/2021    CARPAL TUNNEL RELEASE/REPAIR RIGHT performed by Smiley Ospina MD at [address] [facility] Right 07/2021    CARPAL TUNNEL RELEASE Left 08/10/2021    CARPAL TUNNEL RELEASE/ REPAIR LEFT, PAT AT [facility] performed by Smiley Ospina MD at [address] [facility]  2008    COLONOSCOPY  11/16/2007    COLONOSCOPY  01/07/2013    Dereck Carreon (polyps)    COLONOSCOPY  02/12/2016    w/polypectomy     COLONOSCOPY N/A 02/18/2019    COLORECTAL CANCER SCREENING, HIGH RISK performed by Sergio Tam MD at [facility]    COLONOSCOPY N/A 02/21/2022    COLORECTAL CANCER SCREENING, HIGH RISK performed by Dallas Gilliland MD at [address] [facility] Left 01/08/2020    FLEXIBLE CYSTOSCOPY LEFT DOUBLE J [address] (RECENT SURGERY 12/23/19) KUB ON ARRIVAL performed by Kenia Garibay MD at [facility] [address] / [address] / STONE Left 12/23/2019    CYSTOSCOPY LEFT RETROGRADE PYELOGRAM  LEFT DOUBLE J STENT performed by Anna Cm MD at [address] [facility], COLON, DIAGNOSTIC      HYSTERECTOMY ([address])  [address]  06/2019    LTKR    KNEE ARTHROSCOPY Bilateral 1987 2003 1994    left X2 and Right X1    LITHOTRIPSY Left 12/23/2019    LEFT ESWL performed by Anna Cm MD at [address] [place] [address] Left 06/19/2019    LEFT TOTAL KNEE REPLACEMENT- [address] performed by Hi Campuzano MD at [address] [place] [address] Right 04/14/2022       Social History     Socioeconomic History    Marital status:      Spouse name: Not on file    Number of children: Not on file    Years of education: Not on file    Highest education level: Not on file   Occupational History    Not on file   Tobacco Use    Smoking status: Former     Packs/day: 0.25     Years: 5.00     Pack years: 1.25     Types: Cigarettes Quit date: 0     Years since quittin.9    Smokeless tobacco: Never   Vaping Use    Vaping Use: Never used   Substance and Sexual Activity    Alcohol use: Yes     Comment: occ. Drug use: No    Sexual activity: Not on file   Other Topics Concern    Not on file   Social History Narrative    Not on file     Social Determinants of Health     Financial Resource Strain: Low Risk     Difficulty of Paying Living Expenses: Not hard at all   Food Insecurity: No Food Insecurity    Worried About Running Out of Food in the Last Year: Never true    Ran Out of Food in the Last Year: Never true   Transportation Needs: Not on file   Physical Activity: Inactive    Days of Exercise per Week: 0 days    Minutes of Exercise per Session: 0 min   Stress: Not on file   Social Connections: Not on file   Intimate Partner Violence: Not on file   Housing Stability: Not on file        Family History   Problem Relation Age of Onset    Heart Disease Mother     Cancer Mother         RENAL CELL    COPD Mother     Heart Attack Mother     Diabetes Other     Breast Cancer Other     Colon Cancer Father     Macular Degen Father     Cancer Brother         prostate cancer    No Known Problems Son        Vitals:    22 1605   BP: 136/80   Pulse: 83   Resp: 14   SpO2: 97%   Weight: 212 lb (96.2 kg)   Height: 5' 8\" (1.727 m)     Estimated body mass index is 32.23 kg/m² as calculated from the following:    Height as of this encounter: 5' 8\" (1.727 m). Weight as of this encounter: 212 lb (96.2 kg). Physical Exam  Constitutional:       General: She is not in acute distress. Appearance: She is well-developed. HENT:      Head: Normocephalic. Right Ear: External ear normal.      Left Ear: External ear normal.   Eyes:      Conjunctiva/sclera: Conjunctivae normal.   Neck:      Vascular: No JVD. Trachea: No tracheal deviation. Cardiovascular:      Rate and Rhythm: Normal rate and regular rhythm.       Heart sounds: Normal heart sounds. Pulmonary:      Effort: Pulmonary effort is normal. No respiratory distress. Breath sounds: Normal breath sounds. No wheezing or rales. Chest:      Chest wall: No tenderness. Abdominal:      General: Bowel sounds are normal. There is no distension. Palpations: Abdomen is soft. There is no mass. Tenderness: There is no abdominal tenderness. There is no guarding or rebound. Musculoskeletal:         General: No tenderness or deformity. Cervical back: Neck supple. Lymphadenopathy:      Cervical: No cervical adenopathy. Skin:     General: Skin is warm and dry. Coloration: Skin is not pale. Findings: No erythema or rash. Neurological:      Mental Status: She is alert and oriented to person, place, and time. Motor: No abnormal muscle tone. Psychiatric:         Thought Content: Thought content normal.         Judgment: Judgment normal.       ASSESSMENT/PLAN:        Essential hypertension  -Continue Norvasc 5 mg po daily bisoprolol dc'd due to headaches. Blood pressure readings have been normal at home. We will consider Lotrel if blood pressures remain elevated. Hypothyroidism, unspecified type  -Continue Synthroid 150 mcg daily           Hyperlipidemia, unspecified hyperlipidemia type-continue pravastatin          Hyperglycemia  -Encouraged aerobic exercise daily. ds.        Class 1 obesity due to excess calories with serious comorbidity and body mass index (BMI)= 33.18 as stated per #1. Right upper quadrant pain: Resolved. Monitoring. Postmenopausal symptoms-continue estradiol. Preoperative clearance the patient is medically clear for surgery. Return in about 4 years (around 12/6/2026). An  electronic signature was used to authenticate this note.     --Edmar Pacheco MD on 12/6/2022 at 4:52 PM

## 2022-12-11 RX ORDER — AMLODIPINE BESYLATE 5 MG/1
TABLET ORAL
Qty: 90 TABLET | Refills: 1 | Status: SHIPPED | OUTPATIENT
Start: 2022-12-11

## 2022-12-11 NOTE — TELEPHONE ENCOUNTER
requesting medication refill.  Please approve or deny this request.    Rx requested:  Requested Prescriptions     Pending Prescriptions Disp Refills    amLODIPine (NORVASC) 5 MG tablet [Pharmacy Med Name: AMLODIPINE BESYLATE 5 MG TAB] 90 tablet 1     Sig: TAKE 1 TABLET BY MOUTH EVERY DAY         Last Office Visit:   12/6/2022      Next Visit Date:  Future Appointments   Date Time Provider Fauzia Jensen   12/12/2022  8:00 AM [facility] ROOM 3 [address] [facility]   1/13/2023  8:30 AM DO CELINE AcharyaOX [address] [place] [address]   4/6/2023  8:45 AM Leigh Ann Rendon MD [facility]   4/7/2023  9:00 AM Kristina De Anda MD [facility]   7/28/2023 10:00 AM Yanira Mclaughlin MD [facility]

## 2022-12-12 ENCOUNTER — HOSPITAL ENCOUNTER (OUTPATIENT)
Dept: WOMENS IMAGING | Age: 67
Discharge: HOME | End: 2022-12-14
Payer: MEDICARE

## 2022-12-12 DIAGNOSIS — Z12.31 BREAST CANCER SCREENING BY MAMMOGRAM: ICD-10-CM

## 2022-12-12 PROCEDURE — 77063 BREAST TOMOSYNTHESIS BI: CPT

## 2022-12-12 RX ORDER — PRAVASTATIN SODIUM 40 MG
TABLET ORAL
Qty: 90 TABLET | Refills: 3 | Status: SHIPPED | OUTPATIENT
Start: 2022-12-12

## 2023-01-12 RX ORDER — ESTRADIOL 0.05 MG/D
1 PATCH TRANSDERMAL
Qty: 24 PATCH | Refills: 3 | Status: SHIPPED | OUTPATIENT
Start: 2023-01-12

## 2023-01-12 NOTE — TELEPHONE ENCOUNTER
Dayton called this morning to reschedule her annual for tomorrow because she is not feeling well. She's requesting a refill of her Miroslava patch. Medication pending; Pharmacy verified.

## 2023-02-17 RX ORDER — CLOTRIMAZOLE AND BETAMETHASONE DIPROPIONATE 10; .64 MG/G; MG/G
CREAM TOPICAL
Qty: 30 G | Refills: 2 | Status: SHIPPED | OUTPATIENT
Start: 2023-02-17

## 2023-03-15 ENCOUNTER — TELEMEDICINE (OUTPATIENT)
Dept: FAMILY MEDICINE CLINIC | Age: 68
End: 2023-03-15
Payer: MEDICARE

## 2023-03-15 DIAGNOSIS — U07.1 COVID: Primary | ICD-10-CM

## 2023-03-15 PROCEDURE — 1123F ACP DISCUSS/DSCN MKR DOCD: CPT | Performed by: INTERNAL MEDICINE

## 2023-03-15 PROCEDURE — 99213 OFFICE O/P EST LOW 20 MIN: CPT | Performed by: INTERNAL MEDICINE

## 2023-03-15 PROCEDURE — 3017F COLORECTAL CA SCREEN DOC REV: CPT | Performed by: INTERNAL MEDICINE

## 2023-03-15 PROCEDURE — G8428 CUR MEDS NOT DOCUMENT: HCPCS | Performed by: INTERNAL MEDICINE

## 2023-03-15 PROCEDURE — 1090F PRES/ABSN URINE INCON ASSESS: CPT | Performed by: INTERNAL MEDICINE

## 2023-03-15 PROCEDURE — G8399 PT W/DXA RESULTS DOCUMENT: HCPCS | Performed by: INTERNAL MEDICINE

## 2023-03-15 ASSESSMENT — ENCOUNTER SYMPTOMS
DIARRHEA: 0
RECTAL PAIN: 0
VOICE CHANGE: 0
SORE THROAT: 0
BLOOD IN STOOL: 0
COUGH: 1
ABDOMINAL DISTENTION: 0
ABDOMINAL PAIN: 0
WHEEZING: 0
EYE PAIN: 0
NAUSEA: 0
EYE REDNESS: 0
SHORTNESS OF BREATH: 0
COLOR CHANGE: 0
CHEST TIGHTNESS: 0
EYE ITCHING: 0
EYE DISCHARGE: 0
FACIAL SWELLING: 0
SINUS PRESSURE: 0
SINUS PAIN: 0
BACK PAIN: 0
APNEA: 0
RHINORRHEA: 0
PHOTOPHOBIA: 0
TROUBLE SWALLOWING: 0
VOMITING: 0
CONSTIPATION: 0

## 2023-03-15 NOTE — PROGRESS NOTES
Subjective:      Patient ID: Paul Mcclendon is a 80 y.o. female Established patient, here for evaluation of the following chief complaint(s):  Chief Complaint   Patient presents with    Other         HPI  80 yr old female presenting after testing positive for COVID. She tested positive for COVID-19 today. The patient is experiencing myalgias, cough and subjective fevers. At present he denies polyuria,  Polydipsia, constitutional, sinus, visual, additional cardiopulmonary, urologic, gastrointestinal, immunologic/hematologic, additional musculoskeletal, neurologic,dermatologic, or psychiatric complaints. Current Outpatient Medications on File Prior to Visit   Medication Sig Dispense Refill    clotrimazole-betamethasone (LOTRISONE) 1-0.05 % cream APPLY TO AFFECTED AREA TWICE A DAY 30 g 2    DAR 0.05 MG/24HR Place 1 patch onto the skin Twice a Week 24 patch 3    pravastatin (PRAVACHOL) 40 MG tablet TAKE 1 TABLET BY MOUTH EVERY DAY 90 tablet 3    amLODIPine (NORVASC) 5 MG tablet TAKE 1 TABLET BY MOUTH EVERY DAY 90 tablet 1    levothyroxine (SYNTHROID) 150 MCG tablet TAKE 1 TABLET BY MOUTH EVERY DAY 90 tablet 2    erythromycin (ROMYCIN) 5 MG/GM ophthalmic ointment        No current facility-administered medications on file prior to visit. Antiseptic products, misc. Review of Systems   Constitutional:  Positive for fever. Negative for chills, diaphoresis and fatigue. HENT:  Negative for congestion, dental problem, drooling, ear discharge, ear pain, facial swelling, hearing loss, mouth sores, nosebleeds, postnasal drip, rhinorrhea, sinus pressure, sinus pain, sneezing, sore throat, tinnitus, trouble swallowing and voice change. Eyes:  Negative for photophobia, pain, discharge, redness, itching and visual disturbance. Respiratory:  Positive for cough. Negative for apnea, chest tightness, shortness of breath and wheezing. Cardiovascular:  Negative for chest pain, palpitations and leg swelling. Gastrointestinal:  Negative for abdominal distention, abdominal pain, blood in stool, constipation, diarrhea, nausea, rectal pain and vomiting. Endocrine: Negative for cold intolerance, heat intolerance, polydipsia, polyphagia and polyuria. Genitourinary:  Negative for decreased urine volume, difficulty urinating, dysuria, flank pain, frequency, genital sores, hematuria and urgency. Musculoskeletal:  Positive for myalgias. Negative for arthralgias, back pain, gait problem, joint swelling, neck pain and neck stiffness. Skin:  Negative for color change, rash and wound. Allergic/Immunologic: Negative for environmental allergies and food allergies. Neurological:  Negative for dizziness, tremors, seizures, syncope, facial asymmetry, speech difficulty, weakness, light-headedness, numbness and headaches. Hematological:  Negative for adenopathy. Does not bruise/bleed easily. Psychiatric/Behavioral:  Negative for agitation, confusion, decreased concentration, hallucinations, self-injury, sleep disturbance and suicidal ideas. The patient is not nervous/anxious. Objective:   LMP 09/27/1998 (Approximate)     Physical Exam    Assessment:       Diagnosis Orders   1. COVID             No results found for: LIPIDPAN, BMP, CMP, CBC, CBCAUTODIF  Plan:      Mati Calderon was seen today for other. Diagnoses and all orders for this visit:    COVID    Other orders  -     nirmatrelvir/ritonavir 300/100 (PAXLOVID) 20 x 150 MG & 10 x 100MG TBPK; Take 3 tablets (two 150 mg nirmatrelvir and one 100 mg ritonavir tablets) by mouth every 12 hours for 5 days. No follow-ups on file. Luisito Robles is a 80 y.o. female evaluated via telephone on 3/15/2023 for Other  . Documentation:  I communicated with the patient and/or health care decision maker about COVID-19.    Details of this discussion including any medical advice provided: Please refer to note above    Total Time: minutes: 11-20 minutes    Roshan Pérez Devin Coburn was evaluated through a synchronous (real-time) audio encounter. Patient identification was verified at the start of the visit. She (or guardian if applicable) is aware that this is a billable service, which includes applicable co-pays. This visit was conducted with the patient's (and/or legal guardian's) verbal consent. She has not had a related appointment within my department in the past 7 days or scheduled within the next 24 hours. The patient was located at Home: [address] [place] [address]. The provider was located at [facility] (Appt Dept): [address] [place] [address],  [address] [place] [address]. Note: not billable if this call serves to triage the patient into an appointment for the relevant concern    Ron Farris MD       On this date 03/15/23 I have spent 15 minutes reviewing previous notes, test results and face to face with the patient discussing the diagnosis and importance of compliance with the treatment plan. Ron Farris MD    Please note, this report has been partially produced using speech recognition software  and may cause  and /or contain errors related to that system including grammar, punctuation and spelling as well as words and phrases that may seem inappropriate. If there are questions or concerns please feel free to contact me to clarify.

## 2023-04-03 DIAGNOSIS — E03.9 HYPOTHYROIDISM, UNSPECIFIED TYPE: ICD-10-CM

## 2023-04-03 LAB
T4 FREE SERPL-MCNC: 1.18 NG/DL (ref 0.84–1.68)
TSH SERPL-MCNC: 5.11 UIU/ML (ref 0.44–3.86)

## 2023-04-03 SDOH — ECONOMIC STABILITY: HOUSING INSECURITY
IN THE LAST 12 MONTHS, WAS THERE A TIME WHEN YOU DID NOT HAVE A STEADY PLACE TO SLEEP OR SLEPT IN A SHELTER (INCLUDING NOW)?: NO

## 2023-04-03 SDOH — ECONOMIC STABILITY: INCOME INSECURITY: HOW HARD IS IT FOR YOU TO PAY FOR THE VERY BASICS LIKE FOOD, HOUSING, MEDICAL CARE, AND HEATING?: NOT HARD AT ALL

## 2023-04-03 SDOH — ECONOMIC STABILITY: FOOD INSECURITY: WITHIN THE PAST 12 MONTHS, YOU WORRIED THAT YOUR FOOD WOULD RUN OUT BEFORE YOU GOT MONEY TO BUY MORE.: NEVER TRUE

## 2023-04-03 SDOH — ECONOMIC STABILITY: FOOD INSECURITY: WITHIN THE PAST 12 MONTHS, THE FOOD YOU BOUGHT JUST DIDN'T LAST AND YOU DIDN'T HAVE MONEY TO GET MORE.: NEVER TRUE

## 2023-04-04 LAB — T4 TOTAL: 7 UG/DL (ref 4.5–10.9)

## 2023-04-06 ENCOUNTER — OFFICE VISIT (OUTPATIENT)
Dept: FAMILY MEDICINE CLINIC | Age: 68
End: 2023-04-06

## 2023-04-06 VITALS
DIASTOLIC BLOOD PRESSURE: 74 MMHG | TEMPERATURE: 97.6 F | HEIGHT: 68 IN | HEART RATE: 84 BPM | SYSTOLIC BLOOD PRESSURE: 124 MMHG | BODY MASS INDEX: 32.74 KG/M2 | OXYGEN SATURATION: 98 % | WEIGHT: 216 LBS

## 2023-04-06 DIAGNOSIS — R10.11 RIGHT UPPER QUADRANT ABDOMINAL PAIN: ICD-10-CM

## 2023-04-06 DIAGNOSIS — E78.5 HYPERLIPIDEMIA, UNSPECIFIED HYPERLIPIDEMIA TYPE: ICD-10-CM

## 2023-04-06 DIAGNOSIS — R73.9 HYPERGLYCEMIA: ICD-10-CM

## 2023-04-06 DIAGNOSIS — E03.9 HYPOTHYROIDISM, UNSPECIFIED TYPE: ICD-10-CM

## 2023-04-06 DIAGNOSIS — I10 ESSENTIAL HYPERTENSION: Primary | ICD-10-CM

## 2023-04-06 ASSESSMENT — ENCOUNTER SYMPTOMS
EYE ITCHING: 0
RECTAL PAIN: 0
SINUS PAIN: 0
EYE REDNESS: 0
APNEA: 0
BLOOD IN STOOL: 0
CONSTIPATION: 0
EYE PAIN: 0
COLOR CHANGE: 0
RHINORRHEA: 0
ABDOMINAL PAIN: 0
COUGH: 0
EYE DISCHARGE: 0
VOMITING: 0
VOICE CHANGE: 0
PHOTOPHOBIA: 0
SORE THROAT: 0
SHORTNESS OF BREATH: 0
ABDOMINAL DISTENTION: 0
TROUBLE SWALLOWING: 0
BACK PAIN: 0
SINUS PRESSURE: 0
FACIAL SWELLING: 0
NAUSEA: 0
WHEEZING: 0
DIARRHEA: 0
CHEST TIGHTNESS: 0

## 2023-04-06 ASSESSMENT — PATIENT HEALTH QUESTIONNAIRE - PHQ9
1. LITTLE INTEREST OR PLEASURE IN DOING THINGS: 0
SUM OF ALL RESPONSES TO PHQ QUESTIONS 1-9: 0
2. FEELING DOWN, DEPRESSED OR HOPELESS: 0
SUM OF ALL RESPONSES TO PHQ QUESTIONS 1-9: 0
SUM OF ALL RESPONSES TO PHQ QUESTIONS 1-9: 0
SUM OF ALL RESPONSES TO PHQ9 QUESTIONS 1 & 2: 0
SUM OF ALL RESPONSES TO PHQ QUESTIONS 1-9: 0

## 2023-04-06 NOTE — PROGRESS NOTES
vomiting. Endocrine: Negative for cold intolerance, heat intolerance, polydipsia, polyphagia and polyuria. Genitourinary:  Negative for decreased urine volume, difficulty urinating, dysuria, flank pain, frequency, genital sores, hematuria and urgency. Musculoskeletal:  Negative for arthralgias, back pain, gait problem, joint swelling, myalgias, neck pain and neck stiffness. Skin:  Negative for color change, rash and wound. Allergic/Immunologic: Negative for environmental allergies and food allergies. Neurological:  Negative for dizziness, tremors, seizures, syncope, facial asymmetry, speech difficulty, weakness, light-headedness, numbness and headaches. Hematological:  Negative for adenopathy. Does not bruise/bleed easily. Psychiatric/Behavioral:  Negative for agitation, confusion, decreased concentration, hallucinations, self-injury, sleep disturbance and suicidal ideas. The patient is not nervous/anxious. Prior to Visit Medications    Medication Sig Taking? Authorizing Provider   clotrimazole-betamethasone (LOTRISONE) 1-0.05 % cream APPLY TO AFFECTED AREA TWICE A DAY Yes Alexis Figueroa DO   DAR 0.05 MG/24HR Place 1 patch onto the skin Twice a Week Yes Alexis Figueroa DO   pravastatin (PRAVACHOL) 40 MG tablet TAKE 1 TABLET BY MOUTH EVERY DAY Yes Jose Alejandro Zimmerman MD   amLODIPine (NORVASC) 5 MG tablet TAKE 1 TABLET BY MOUTH EVERY DAY Yes Kimmy Garcia MD   levothyroxine (SYNTHROID) 150 MCG tablet TAKE 1 TABLET BY MOUTH Jarrod Haver Yes Kaden Bui MD   erythromycin (ROMYCIN) 5 MG/GM ophthalmic ointment  Yes Historical Provider, MD        Allergies   Allergen Reactions    Antiseptic Products, Misc.  Rash     duraprep       Past Medical History:   Diagnosis Date    Arthritis     both knees    Hyperlipidemia     meds > 10 yrs    Hypertension     meds > 4 yrs    Hyperthyroidism     Hypothyroidism     meds > 20 yrs    Palpitations 7/30/2021    Thyroid goiter        Past Surgical History:   Procedure

## 2023-04-09 DIAGNOSIS — E03.9 HYPOTHYROIDISM, UNSPECIFIED TYPE: Primary | ICD-10-CM

## 2023-04-09 RX ORDER — LEVOTHYROXINE SODIUM 175 UG/1
175 TABLET ORAL DAILY
Qty: 30 TABLET | Refills: 5 | Status: SHIPPED | OUTPATIENT
Start: 2023-04-09

## 2023-04-19 RX ORDER — BENZONATATE 200 MG/1
200 CAPSULE ORAL 3 TIMES DAILY PRN
Qty: 30 CAPSULE | Refills: 0 | Status: SHIPPED | OUTPATIENT
Start: 2023-04-19 | End: 2023-04-26

## 2023-05-02 RX ORDER — LEVOTHYROXINE SODIUM 175 UG/1
TABLET ORAL
Qty: 30 TABLET | Refills: 5 | OUTPATIENT
Start: 2023-05-02

## 2023-05-22 RX ORDER — LEVOTHYROXINE SODIUM 0.15 MG/1
150 TABLET ORAL DAILY
Qty: 90 TABLET | Refills: 2 | Status: SHIPPED | OUTPATIENT
Start: 2023-05-22

## 2023-06-05 RX ORDER — AMLODIPINE BESYLATE 5 MG/1
TABLET ORAL
Qty: 90 TABLET | Refills: 1 | Status: SHIPPED | OUTPATIENT
Start: 2023-06-05

## 2023-06-19 ENCOUNTER — OFFICE VISIT (OUTPATIENT)
Dept: ORTHOPEDIC SURGERY | Age: 68
End: 2023-06-19
Payer: MEDICARE

## 2023-06-19 VITALS
OXYGEN SATURATION: 97 % | BODY MASS INDEX: 31.52 KG/M2 | TEMPERATURE: 98.1 F | HEART RATE: 78 BPM | DIASTOLIC BLOOD PRESSURE: 82 MMHG | WEIGHT: 208 LBS | SYSTOLIC BLOOD PRESSURE: 132 MMHG | HEIGHT: 68 IN

## 2023-06-19 DIAGNOSIS — G62.9 NEUROPATHY: ICD-10-CM

## 2023-06-19 DIAGNOSIS — M79.671 FOOT PAIN, RIGHT: Primary | ICD-10-CM

## 2023-06-19 PROCEDURE — G8399 PT W/DXA RESULTS DOCUMENT: HCPCS | Performed by: PHYSICIAN ASSISTANT

## 2023-06-19 PROCEDURE — 3078F DIAST BP <80 MM HG: CPT | Performed by: PHYSICIAN ASSISTANT

## 2023-06-19 PROCEDURE — G8427 DOCREV CUR MEDS BY ELIG CLIN: HCPCS | Performed by: PHYSICIAN ASSISTANT

## 2023-06-19 PROCEDURE — G8417 CALC BMI ABV UP PARAM F/U: HCPCS | Performed by: PHYSICIAN ASSISTANT

## 2023-06-19 PROCEDURE — 99203 OFFICE O/P NEW LOW 30 MIN: CPT | Performed by: PHYSICIAN ASSISTANT

## 2023-06-19 PROCEDURE — 3074F SYST BP LT 130 MM HG: CPT | Performed by: PHYSICIAN ASSISTANT

## 2023-06-19 PROCEDURE — 1036F TOBACCO NON-USER: CPT | Performed by: PHYSICIAN ASSISTANT

## 2023-06-19 PROCEDURE — 1123F ACP DISCUSS/DSCN MKR DOCD: CPT | Performed by: PHYSICIAN ASSISTANT

## 2023-06-19 PROCEDURE — 3017F COLORECTAL CA SCREEN DOC REV: CPT | Performed by: PHYSICIAN ASSISTANT

## 2023-06-19 PROCEDURE — 1090F PRES/ABSN URINE INCON ASSESS: CPT | Performed by: PHYSICIAN ASSISTANT

## 2023-06-27 ASSESSMENT — ENCOUNTER SYMPTOMS
RESPIRATORY NEGATIVE: 1
GASTROINTESTINAL NEGATIVE: 1
EYES NEGATIVE: 1

## 2023-07-06 ENCOUNTER — OFFICE VISIT (OUTPATIENT)
Dept: OBGYN CLINIC | Age: 68
End: 2023-07-06

## 2023-07-06 VITALS
WEIGHT: 214 LBS | SYSTOLIC BLOOD PRESSURE: 124 MMHG | BODY MASS INDEX: 32.43 KG/M2 | HEART RATE: 72 BPM | DIASTOLIC BLOOD PRESSURE: 80 MMHG | HEIGHT: 68 IN

## 2023-07-06 DIAGNOSIS — E03.9 HYPOTHYROIDISM, UNSPECIFIED TYPE: ICD-10-CM

## 2023-07-06 DIAGNOSIS — Z12.31 ENCOUNTER FOR SCREENING MAMMOGRAM FOR BREAST CANCER: Primary | ICD-10-CM

## 2023-07-06 LAB — TSH SERPL-MCNC: 0.87 UIU/ML (ref 0.44–3.86)

## 2023-07-06 PROCEDURE — 3074F SYST BP LT 130 MM HG: CPT | Performed by: OBSTETRICS & GYNECOLOGY

## 2023-07-06 PROCEDURE — 3079F DIAST BP 80-89 MM HG: CPT | Performed by: OBSTETRICS & GYNECOLOGY

## 2023-07-06 RX ORDER — ESTRADIOL 0.05 MG/D
PATCH TRANSDERMAL
COMMUNITY

## 2023-07-06 ASSESSMENT — ENCOUNTER SYMPTOMS
TROUBLE SWALLOWING: 0
WHEEZING: 0
BLOOD IN STOOL: 0
COUGH: 0
BACK PAIN: 0
ABDOMINAL PAIN: 0
CHEST TIGHTNESS: 0
SHORTNESS OF BREATH: 0
VOMITING: 0
VOICE CHANGE: 0
ABDOMINAL DISTENTION: 0
COLOR CHANGE: 0
NAUSEA: 0
CONSTIPATION: 0
SORE THROAT: 0

## 2023-07-06 NOTE — PROGRESS NOTES
CHIEF COMPLAINT:    Chief Complaint   Patient presents with    Annual Exam     Last pap 10/29/19 normal   Is here for her annual examination. She is a 72-year-old menopausal female whose had a previous hysterectomy. Climara patch. An episode of vaginal bleeding she thinks it was related to the passing of stone. Occasional chest pain on the left side when she is working out at [employer]. HISTORY OF PRESENT ILLNESS:  80 y.o. female presents for her annual exam. She had no concernsor complaints today. Her menses is absent. She denies breakthrough bleeding, pelvic pain, or abnormal discharge. Bowel and bladder function is normal. Her health overallhas been good.      Past Medical History:   Diagnosis Date    Arthritis     both knees    Hyperlipidemia     meds > 10 yrs    Hypertension     meds > 4 yrs    Hyperthyroidism     Hypothyroidism     meds > 20 yrs    Palpitations 7/30/2021    Thyroid goiter      Past Surgical History:   Procedure Laterality Date    APPENDECTOMY  1981    with left oophorectomy    BREAST BIOPSY Left 2012    x 2 / both benign    CARPAL TUNNEL RELEASE Right 07/13/2021    CARPAL TUNNEL RELEASE/REPAIR RIGHT performed by Rosibel Esteban MD at [address] [place] [address] Right 07/2021    CARPAL TUNNEL RELEASE Left 08/10/2021    CARPAL TUNNEL RELEASE/ REPAIR LEFT, PAT AT [facility] performed by Rosibel Esteban MD at [facility]  2008    COLONOSCOPY  11/16/2007    COLONOSCOPY  01/07/2013    Neptali Carreon (polyps)    COLONOSCOPY  02/12/2016    w/polypectomy     COLONOSCOPY N/A 02/18/2019    COLORECTAL CANCER SCREENING, HIGH RISK performed by Casandra Valdez MD at [address] [place] [address] 02/21/2022    COLORECTAL CANCER SCREENING, HIGH RISK performed by Walter Garcia MD at [facility] 01/08/2020    FLEXIBLE CYSTOSCOPY LEFT DOUBLE J [address] (RECENT SURGERY 12/23/19) KUB ON ARRIVAL performed by Lin Brady

## 2023-07-07 ENCOUNTER — OFFICE VISIT (OUTPATIENT)
Dept: FAMILY MEDICINE CLINIC | Age: 68
End: 2023-07-07

## 2023-07-07 VITALS
HEART RATE: 86 BPM | BODY MASS INDEX: 32.43 KG/M2 | HEIGHT: 68 IN | DIASTOLIC BLOOD PRESSURE: 76 MMHG | OXYGEN SATURATION: 96 % | SYSTOLIC BLOOD PRESSURE: 120 MMHG | RESPIRATION RATE: 14 BRPM | WEIGHT: 214 LBS

## 2023-07-07 DIAGNOSIS — I10 ESSENTIAL HYPERTENSION: Primary | ICD-10-CM

## 2023-07-07 LAB — T4 TOTAL: 6.9 UG/DL (ref 4.5–10.9)

## 2023-07-07 ASSESSMENT — ENCOUNTER SYMPTOMS
VOICE CHANGE: 0
NAUSEA: 0
FACIAL SWELLING: 0
APNEA: 0
BACK PAIN: 0
BLOOD IN STOOL: 0
RHINORRHEA: 0
PHOTOPHOBIA: 0
SORE THROAT: 0
DIARRHEA: 0
ABDOMINAL PAIN: 0
RECTAL PAIN: 0
TROUBLE SWALLOWING: 0
COUGH: 0
CONSTIPATION: 0
ABDOMINAL DISTENTION: 0
WHEEZING: 0
EYE REDNESS: 0
EYE PAIN: 0
EYE DISCHARGE: 0
VOMITING: 0
SINUS PAIN: 0
CHEST TIGHTNESS: 0
SINUS PRESSURE: 0
COLOR CHANGE: 0
EYE ITCHING: 0
SHORTNESS OF BREATH: 0

## 2023-07-07 NOTE — PROGRESS NOTES
tracheal deviation. Cardiovascular:      Rate and Rhythm: Normal rate and regular rhythm. Heart sounds: Normal heart sounds. Pulmonary:      Effort: Pulmonary effort is normal. No respiratory distress. Breath sounds: Normal breath sounds. No wheezing or rales. Chest:      Chest wall: No tenderness. Abdominal:      General: Bowel sounds are normal. There is no distension. Palpations: Abdomen is soft. There is no mass. Tenderness: There is no abdominal tenderness. There is no guarding or rebound. Musculoskeletal:         General: No tenderness or deformity. Cervical back: Neck supple. Lymphadenopathy:      Cervical: No cervical adenopathy. Skin:     General: Skin is warm and dry. Coloration: Skin is not pale. Findings: No erythema or rash. Neurological:      Mental Status: She is alert and oriented to person, place, and time. Motor: No abnormal muscle tone. Psychiatric:         Thought Content: Thought content normal.         Judgment: Judgment normal.       ASSESSMENT/PLAN:        Essential hypertension  -Continue Norvasc 5 mg po daily bisoprolol dc'd due to headaches. Blood pressure readings have been normal at home. We will consider Lotrel if blood pressures remain elevated. Hypothyroidism, unspecified type  -Continue Synthroid 150 mcg daily. Check TSH and free T4 prior to next visit. Hyperlipidemia, unspecified hyperlipidemia type-continue pravastatin          Hyperglycemia  -Encouraged aerobic exercise daily. ds.        Class 1 obesity due to excess calories with serious comorbidity and body mass index (BMI)= 33.02 as stated per #1. Right upper quadrant pain: Resolved. Monitoring. Postmenopausal symptoms-continue estradiol. Preoperative clearance the patient is medically clear for surgery. No follow-ups on file. An  electronic signature was used to authenticate this note.     --Magan Adler MD on

## 2023-07-14 ENCOUNTER — HOSPITAL ENCOUNTER (OUTPATIENT)
Dept: NEUROLOGY | Age: 68
Discharge: HOME | End: 2023-07-14
Payer: MEDICARE

## 2023-07-14 DIAGNOSIS — M79.671 FOOT PAIN, RIGHT: ICD-10-CM

## 2023-07-14 DIAGNOSIS — G62.9 NEUROPATHY: ICD-10-CM

## 2023-07-14 PROCEDURE — 95886 MUSC TEST DONE W/N TEST COMP: CPT

## 2023-07-14 PROCEDURE — 95910 NRV CNDJ TEST 7-8 STUDIES: CPT

## 2023-07-14 SDOH — HEALTH STABILITY: PHYSICAL HEALTH: ON AVERAGE, HOW MANY DAYS PER WEEK DO YOU ENGAGE IN MODERATE TO STRENUOUS EXERCISE (LIKE A BRISK WALK)?: 5 DAYS

## 2023-07-14 SDOH — HEALTH STABILITY: PHYSICAL HEALTH: ON AVERAGE, HOW MANY MINUTES DO YOU ENGAGE IN EXERCISE AT THIS LEVEL?: 60 MIN

## 2023-07-14 ASSESSMENT — PATIENT HEALTH QUESTIONNAIRE - PHQ9
SUM OF ALL RESPONSES TO PHQ QUESTIONS 1-9: 0
SUM OF ALL RESPONSES TO PHQ9 QUESTIONS 1 & 2: 0
1. LITTLE INTEREST OR PLEASURE IN DOING THINGS: 0
2. FEELING DOWN, DEPRESSED OR HOPELESS: 0
SUM OF ALL RESPONSES TO PHQ QUESTIONS 1-9: 0

## 2023-07-14 ASSESSMENT — LIFESTYLE VARIABLES
HOW MANY STANDARD DRINKS CONTAINING ALCOHOL DO YOU HAVE ON A TYPICAL DAY: 1 OR 2
HOW OFTEN DO YOU HAVE SIX OR MORE DRINKS ON ONE OCCASION: 1
HOW OFTEN DO YOU HAVE A DRINK CONTAINING ALCOHOL: 2-3 TIMES A WEEK
HOW OFTEN DO YOU HAVE A DRINK CONTAINING ALCOHOL: 4
HOW MANY STANDARD DRINKS CONTAINING ALCOHOL DO YOU HAVE ON A TYPICAL DAY: 1

## 2023-07-14 NOTE — PROCEDURES
[facility], [address] [facility]                             ELECTROMYOGRAM REPORT    PATIENT NAME: Johnye Burkitt                    :        1955  MED REC NO:   [id number]                            ROOM:  ACCOUNT NO:   [de-identified]                           ADMIT DATE: 2023  PROVIDER:     Juancho Hollins MD    DATE OF [id number]    REFERRING PROVIDER:  Danny Oliver PA-C.    REASON FOR STUDY:  The patient has paresthesias and dysesthesias in the  right ankle. Her mother had diabetes after she was on steroids. She  does not describe any back pain. FINDINGS:  Motor nerve conduction velocities are normal in all the  nerves tested. F-wave latencies are normal in the right common peroneal nerve and  delayed in other nerves tested. Distal motor latencies are normal in all the nerves tested. Distal sensory latencies could not be obtained in the right sural and  superficial peroneal nerves, but are normal in other nerves tested. Amplitudes of sensory responses are decreased in all the nerves tested. Amplitudes of motor responses are normal in the right common peroneal  nerve and had decreased in other nerves tested. On concentric needle electrode examination, denervation changes are  present in the extensor digitorum brevis muscles bilaterally. Mild  denervation changes are seen in the L5 root distribution also. CLINICAL INTERPRETATION:  Electrodiagnostic studies are suggestive of  peripheral neuropathy. She has a positive family history of diabetes  and needs to be watched. If clinically indicated, other causes of peripheral neuropathy could also be looked into such as exposure to  toxins, autoimmune disorder, hypothyroidism, etc.    The patient tried gabapentin, which did not help her.   She could be  tried on medications such as Trileptal, topiramate, etc.    If clinically indicated, we could

## 2023-07-17 ENCOUNTER — TELEMEDICINE (OUTPATIENT)
Dept: FAMILY MEDICINE CLINIC | Age: 68
End: 2023-07-17
Payer: MEDICARE

## 2023-07-17 DIAGNOSIS — Z00.00 MEDICARE ANNUAL WELLNESS VISIT, SUBSEQUENT: Primary | ICD-10-CM

## 2023-07-17 PROCEDURE — G0439 PPPS, SUBSEQ VISIT: HCPCS | Performed by: INTERNAL MEDICINE

## 2023-07-17 PROCEDURE — 3017F COLORECTAL CA SCREEN DOC REV: CPT | Performed by: INTERNAL MEDICINE

## 2023-07-17 PROCEDURE — 1123F ACP DISCUSS/DSCN MKR DOCD: CPT | Performed by: INTERNAL MEDICINE

## 2023-07-17 NOTE — PROGRESS NOTES
Medicare Annual Wellness Visit    Zuri Marr is here for Medicare AWV (Telephone Medicare AWV)    Assessment & Plan     Recommendations for Preventive Services Due: see orders and patient instructions/AVS.  Recommended screening schedule for the next 5-10 years is provided to the patient in written form: see Patient Instructions/AVS.     No follow-ups on file. Subjective       Patient's complete Health Risk Assessment and screening values have been reviewed and are found in Flowsheets. The following problems were reviewed today and where indicated follow up appointments were made and/or referrals ordered. Positive Risk Factor Screenings with Interventions:                 Weight and Activity:  Physical Activity: Sufficiently Active    Days of Exercise per Week: 5 days    Minutes of Exercise per Session: 60 min     On average, how many days per week do you engage in moderate to strenuous exercise (like a brisk walk)?: 5 days  Have you lost any weight without trying in the past 3 months?: No  There is no height or weight on file to calculate BMI. (!) Abnormal    Obesity Interventions:  See AVS for additional education material             Safety:  Do you have working smoke detectors?: (!) No  Do you have either shower bars, grab bars, non-slip mats or non-slip surfaces in your shower or bathtub?: (!) No    Interventions:  See AVS for additional education material                     Objective      Patient-Reported Vitals  No data recorded          Allergies   Allergen Reactions    Antiseptic Products, Misc. Rash     duraprep     Prior to Visit Medications    Medication Sig Taking?  Authorizing Provider   estradiol (CLIMARA) 0.05 MG/24HR  Yes Historical Provider, MD   amLODIPine (NORVASC) 5 MG tablet TAKE 1 TABLET BY MOUTH EVERY DAY Yes Wiliam Melton MD   levothyroxine (SYNTHROID) 150 MCG tablet Take 1 tablet by mouth daily  Patient taking differently: Take 1 tablet by mouth daily Wednesday, Thursday,

## 2023-07-17 NOTE — PATIENT INSTRUCTIONS
Personalized Preventive Plan for Eugenie Weir - 7/17/2023  Medicare offers a range of preventive health benefits. Some of the tests and screenings are paid in full while other may be subject to a deductible, co-insurance, and/or copay. Some of these benefits include a comprehensive review of your medical history including lifestyle, illnesses that may run in your family, and various assessments and screenings as appropriate. After reviewing your medical record and screening and assessments performed today your provider may have ordered immunizations, labs, imaging, and/or referrals for you. A list of these orders (if applicable) as well as your Preventive Care list are included within your After Visit Summary for your review. Other Preventive Recommendations:    A preventive eye exam performed by an eye specialist is recommended every 1-2 years to screen for glaucoma; cataracts, macular degeneration, and other eye disorders. A preventive dental visit is recommended every 6 months. Try to get at least 150 minutes of exercise per week or 10,000 steps per day on a pedometer . Order or download the FREE \"Exercise & Physical Activity: Your Everyday Guide\" from [employer]. Call 8-436.167.7101 or search [employer] online. You need 6346-9786 mg of calcium and 7482-6835 IU of vitamin D per day. It is possible to meet your calcium requirement with diet alone, but a vitamin D supplement is usually necessary to meet this goal.  When exposed to the sun, use a sunscreen that protects against both UVA and UVB radiation with an SPF of 30 or greater. Reapply every 2 to 3 hours or after sweating, drying off with a towel, or swimming. Always wear a seat belt when traveling in a car. Always wear a helmet when riding a bicycle or motorcycle. Heart-Healthy Diet   Sodium, Fat, and Cholesterol Controlled Diet       What Is a Heart Healthy Diet?    A heart-healthy diet is one that

## 2023-07-20 ENCOUNTER — PATIENT MESSAGE (OUTPATIENT)
Dept: OBGYN CLINIC | Age: 68
End: 2023-07-20

## 2023-07-20 DIAGNOSIS — N64.4 BREAST PAIN, LEFT: Primary | ICD-10-CM

## 2023-07-21 NOTE — TELEPHONE ENCOUNTER
From: Natividad Schwarz  To: Dr. Christine Morillo: 7/20/2023 10:00 AM EDT  Subject: Left breast ache    Could you order me an ultrasound of my left breast. It is still achy and I have not gone to the gym or lifted any weights, yet it still continued to ache. I would just like to rule out that nothing is wrong. Thank you.

## 2023-07-24 ENCOUNTER — TELEPHONE (OUTPATIENT)
Dept: OBGYN CLINIC | Age: 68
End: 2023-07-24

## 2023-07-24 DIAGNOSIS — N64.4 BREAST PAIN, LEFT: Primary | ICD-10-CM

## 2023-07-28 ENCOUNTER — HOSPITAL ENCOUNTER (OUTPATIENT)
Dept: GENERAL RADIOLOGY | Age: 68
End: 2023-07-28
Attending: UROLOGY
Payer: MEDICARE

## 2023-07-28 ENCOUNTER — OFFICE VISIT (OUTPATIENT)
Dept: UROLOGY | Age: 68
End: 2023-07-28
Payer: MEDICARE

## 2023-07-28 VITALS
HEIGHT: 68 IN | SYSTOLIC BLOOD PRESSURE: 126 MMHG | WEIGHT: 212 LBS | HEART RATE: 85 BPM | BODY MASS INDEX: 32.13 KG/M2 | DIASTOLIC BLOOD PRESSURE: 80 MMHG

## 2023-07-28 DIAGNOSIS — N20.0 KIDNEY STONE ON LEFT SIDE: ICD-10-CM

## 2023-07-28 DIAGNOSIS — N20.0 KIDNEY STONES: Primary | ICD-10-CM

## 2023-07-28 PROCEDURE — 74018 RADEX ABDOMEN 1 VIEW: CPT

## 2023-07-28 PROCEDURE — 1123F ACP DISCUSS/DSCN MKR DOCD: CPT | Performed by: UROLOGY

## 2023-07-28 PROCEDURE — 3017F COLORECTAL CA SCREEN DOC REV: CPT | Performed by: UROLOGY

## 2023-07-28 PROCEDURE — 99213 OFFICE O/P EST LOW 20 MIN: CPT | Performed by: UROLOGY

## 2023-07-28 PROCEDURE — G8417 CALC BMI ABV UP PARAM F/U: HCPCS | Performed by: UROLOGY

## 2023-07-28 PROCEDURE — G8427 DOCREV CUR MEDS BY ELIG CLIN: HCPCS | Performed by: UROLOGY

## 2023-07-28 PROCEDURE — 3079F DIAST BP 80-89 MM HG: CPT | Performed by: UROLOGY

## 2023-07-28 PROCEDURE — 1036F TOBACCO NON-USER: CPT | Performed by: UROLOGY

## 2023-07-28 PROCEDURE — 3074F SYST BP LT 130 MM HG: CPT | Performed by: UROLOGY

## 2023-07-28 PROCEDURE — 1090F PRES/ABSN URINE INCON ASSESS: CPT | Performed by: UROLOGY

## 2023-07-28 PROCEDURE — G8399 PT W/DXA RESULTS DOCUMENT: HCPCS | Performed by: UROLOGY

## 2023-07-28 ASSESSMENT — ENCOUNTER SYMPTOMS: ABDOMINAL PAIN: 0

## 2023-07-28 NOTE — PROGRESS NOTES
Subjective:      Patient ID: William Mccain is a 80 y.o. female    HPI  This is a 81 yo female with h/o HTN, hypothyroidism and elevated cholesterol and with Lt 1 cm renal stone s/p Lt ESWL with stent on 12/23/19 and then stent removal. Since last seen on 8/31/22, she had a  Lt PCNL for large stone (CaMo/Di) by Dr James Waters. She had some post-op hematuria that required evaluation in the ED but resolved. She is doing well currently. She was instructed on dietary stone prevention at the [facility]. She had a recent Renal U/S in 723 by Dr James Waters that showed no hydro or stones. KUB today shows no suspicious calcifications. She has no pain or hematuria. I reviewed the interval medical records from [facility].        Past Medical History:   Diagnosis Date    Arthritis     both knees    Hyperlipidemia     meds > 10 yrs    Hypertension     meds > 4 yrs    Hyperthyroidism     Hypothyroidism     meds > 20 yrs    Palpitations 7/30/2021    Thyroid goiter      Past Surgical History:   Procedure Laterality Date    APPENDECTOMY  1981    with left oophorectomy    BREAST BIOPSY Left 2012    x 2 / both benign    CARPAL TUNNEL RELEASE Right 07/13/2021    CARPAL TUNNEL RELEASE/REPAIR RIGHT performed by Willis Wu MD at [address] [place] [address] Right 07/2021    CARPAL TUNNEL RELEASE Left 08/10/2021    CARPAL TUNNEL RELEASE/ REPAIR LEFT, PAT AT [facility] performed by Willis Wu MD at [facility]  2008    COLONOSCOPY  11/16/2007    COLONOSCOPY  01/07/2013    Austin Carreon (polyps)    COLONOSCOPY  02/12/2016    w/polypectomy     COLONOSCOPY N/A 02/18/2019    COLORECTAL CANCER SCREENING, HIGH RISK performed by Leroy Gonzales MD at [facility]    COLONOSCOPY N/A 02/21/2022    COLORECTAL CANCER SCREENING, HIGH RISK performed by Francisco House MD at [facility] Left 01/08/2020    FLEXIBLE CYSTOSCOPY LEFT DOUBLE J STENT REMOVAL (RECENT SURGERY 12/23/19) KUB ON ARRIVAL

## 2023-07-31 ENCOUNTER — OFFICE VISIT (OUTPATIENT)
Dept: ORTHOPEDIC SURGERY | Age: 68
End: 2023-07-31
Payer: MEDICARE

## 2023-07-31 VITALS — WEIGHT: 212 LBS | OXYGEN SATURATION: 98 % | HEART RATE: 87 BPM | HEIGHT: 68 IN | BODY MASS INDEX: 32.13 KG/M2

## 2023-07-31 DIAGNOSIS — M79.2 NEUROPATHIC PAIN, LEG, RIGHT: Primary | ICD-10-CM

## 2023-07-31 PROCEDURE — 1123F ACP DISCUSS/DSCN MKR DOCD: CPT | Performed by: PHYSICIAN ASSISTANT

## 2023-07-31 PROCEDURE — 1090F PRES/ABSN URINE INCON ASSESS: CPT | Performed by: PHYSICIAN ASSISTANT

## 2023-07-31 PROCEDURE — 3017F COLORECTAL CA SCREEN DOC REV: CPT | Performed by: PHYSICIAN ASSISTANT

## 2023-07-31 PROCEDURE — G8417 CALC BMI ABV UP PARAM F/U: HCPCS | Performed by: PHYSICIAN ASSISTANT

## 2023-07-31 PROCEDURE — G8427 DOCREV CUR MEDS BY ELIG CLIN: HCPCS | Performed by: PHYSICIAN ASSISTANT

## 2023-07-31 PROCEDURE — 1036F TOBACCO NON-USER: CPT | Performed by: PHYSICIAN ASSISTANT

## 2023-07-31 PROCEDURE — 99214 OFFICE O/P EST MOD 30 MIN: CPT | Performed by: PHYSICIAN ASSISTANT

## 2023-07-31 PROCEDURE — G8399 PT W/DXA RESULTS DOCUMENT: HCPCS | Performed by: PHYSICIAN ASSISTANT

## 2023-08-04 ASSESSMENT — ENCOUNTER SYMPTOMS
RESPIRATORY NEGATIVE: 1
GASTROINTESTINAL NEGATIVE: 1
EYES NEGATIVE: 1

## 2023-08-04 NOTE — PROGRESS NOTES
Natividad Cevallos (:  1955) is a 80 y.o. female,Established patient, here for evaluation of the following chief complaint(s):  Follow-up (Fight ankle Follow up EMG)         ASSESSMENT/PLAN:  1. Neuropathic pain, leg, right  -     Ambulatory referral to Pain Medicine      No follow-ups on file. 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 Patient states her right ankle pain developed after she had her right knee replaced by Dr. Yair Del Toro in . She had right lower leg pain but extreme right ankle pain to the point where she could not bear weight. X-rays of the ankle normal.  Surgical team denies any trauma to the ankle. Patient states her leg pain has improved however her ankle pain persist.  She gets intermittent sharp jolts of pain at the anterior aspect of the ankle. She has had an EMG study and is here for results      Review of Systems   Constitutional: Negative. HENT: Negative. Eyes: Negative. Respiratory: Negative. Gastrointestinal: Negative. Genitourinary: Negative. Musculoskeletal: Negative. Psychiatric/Behavioral: Negative. Objective [facility], [address] [place] [address]                              ELECTROMYOGRAM REPORT     PATIENT NAME: Natividad Cevallos                    :        1955  MED REC NO:   [id number]                            ROOM:  ACCOUNT NO:   [de-identified]                           ADMIT DATE: 2023  PROVIDER:     Halle Pedersen MD     DATE OF EM2023     REFERRING PROVIDER:  Jorden Rodrigues PA-C.     REASON FOR STUDY:  The patient has paresthesias and dysesthesias in the  right ankle. Her mother had diabetes after she was on steroids. She  does not describe any back pain. FINDINGS:  Motor nerve conduction velocities are normal in all the  nerves tested.      F-wave latencies are normal in the right

## 2023-08-25 ENCOUNTER — HOSPITAL ENCOUNTER (OUTPATIENT)
Dept: ULTRASOUND IMAGING | Age: 68
End: 2023-08-25
Attending: OBSTETRICS & GYNECOLOGY
Payer: MEDICARE

## 2023-08-25 ENCOUNTER — HOSPITAL ENCOUNTER (OUTPATIENT)
Dept: WOMENS IMAGING | Age: 68
End: 2023-08-25
Attending: OBSTETRICS & GYNECOLOGY
Payer: MEDICARE

## 2023-08-25 DIAGNOSIS — N64.4 BREAST PAIN, LEFT: ICD-10-CM

## 2023-08-25 PROCEDURE — 76642 ULTRASOUND BREAST LIMITED: CPT

## 2023-08-25 PROCEDURE — G0279 TOMOSYNTHESIS, MAMMO: HCPCS

## 2023-10-11 RX ORDER — CLOTRIMAZOLE AND BETAMETHASONE DIPROPIONATE 10; .64 MG/G; MG/G
CREAM TOPICAL
Qty: 30 G | Refills: 2 | Status: SHIPPED | OUTPATIENT
Start: 2023-10-11

## 2023-10-23 RX ORDER — LEVOTHYROXINE SODIUM 175 UG/1
175 TABLET ORAL DAILY
Qty: 90 TABLET | Refills: 1 | Status: SHIPPED | OUTPATIENT
Start: 2023-10-23

## 2023-10-23 NOTE — TELEPHONE ENCOUNTER
Future Appointments    Encounter Information    Provider Department Appt Notes   12/8/2023 Denae Cameron MD [facility] Return in about 5 months (around 12/7/2023).    7/12/2024 Emerson Kern [facility] Obstetrics and Gynecology annual   8/2/2024 Martin Dior, [facility] Urology 1 Yr KUB     Past Visits    Date Provider Specialty Visit Type Primary Dx   07/31/2023 LARON Perrin Orthopedic Surgery Office Visit Neuropathic pain, leg, right   07/28/2023 Martin Dior MD Urology Office Visit Kidney stones   07/17/2023 Denae Cameron MD Family Medicine Telemedicine Medicare annual wellness visit, subsequent

## 2023-11-03 ENCOUNTER — TELEPHONE (OUTPATIENT)
Dept: UROLOGY | Age: 68
End: 2023-11-03

## 2023-11-03 ENCOUNTER — HOSPITAL ENCOUNTER (OUTPATIENT)
Dept: CT IMAGING | Age: 68
End: 2023-11-03
Attending: UROLOGY
Payer: MEDICARE

## 2023-11-03 DIAGNOSIS — R10.9 LEFT FLANK PAIN: Primary | ICD-10-CM

## 2023-11-03 DIAGNOSIS — R10.9 LEFT FLANK PAIN: ICD-10-CM

## 2023-11-03 PROCEDURE — 74176 CT ABD & PELVIS W/O CONTRAST: CPT

## 2023-11-03 NOTE — TELEPHONE ENCOUNTER
Patient is having left flank pain, getting worse, sure she has a kidney stone. She would like an order for KUB. Can we do this?

## 2023-12-01 DIAGNOSIS — E03.9 HYPOTHYROIDISM, UNSPECIFIED TYPE: ICD-10-CM

## 2023-12-01 LAB
T4 FREE SERPL-MCNC: 1.62 NG/DL (ref 0.84–1.68)
TSH SERPL-MCNC: 0.18 UIU/ML (ref 0.44–3.86)

## 2023-12-02 LAB — T4 TOTAL: 9.1 UG/DL (ref 4.5–11.7)

## 2023-12-03 NOTE — TELEPHONE ENCOUNTER
Pharmacy requesting medication refill.  Please approve or deny this request.    Rx requested:  Requested Prescriptions     Pending Prescriptions Disp Refills    pravastatin (PRAVACHOL) 40 MG tablet [Pharmacy Med Name: PRAVASTATIN SODIUM 40 MG TAB] 90 tablet 3     Sig: TAKE 1 TABLET BY MOUTH EVERY DAY         Last Office Visit:   7/17/2023      Next Visit Date:  Future Appointments   Date Time Provider [facility] [address]   12/8/2023  8:30 AM Ellen Solomon MD [facility]   7/12/2024  8:30 AM [facility]   8/2/2024  9:15 AM Tahir Weston  [facility]

## 2023-12-04 RX ORDER — PRAVASTATIN SODIUM 40 MG
TABLET ORAL
Qty: 90 TABLET | Refills: 3 | Status: SHIPPED | OUTPATIENT
Start: 2023-12-04

## 2023-12-05 RX ORDER — AMLODIPINE BESYLATE 5 MG/1
TABLET ORAL
Qty: 90 TABLET | Refills: 1 | Status: SHIPPED | OUTPATIENT
Start: 2023-12-05

## 2023-12-08 ENCOUNTER — TELEPHONE (OUTPATIENT)
Dept: FAMILY MEDICINE CLINIC | Age: 68
End: 2023-12-08

## 2023-12-08 ENCOUNTER — TELEMEDICINE (OUTPATIENT)
Dept: FAMILY MEDICINE CLINIC | Age: 68
End: 2023-12-08

## 2023-12-08 DIAGNOSIS — E78.5 HYPERLIPIDEMIA, UNSPECIFIED HYPERLIPIDEMIA TYPE: ICD-10-CM

## 2023-12-08 DIAGNOSIS — I10 ESSENTIAL HYPERTENSION: Primary | ICD-10-CM

## 2023-12-08 DIAGNOSIS — E03.9 HYPOTHYROIDISM, UNSPECIFIED TYPE: ICD-10-CM

## 2023-12-08 RX ORDER — AZITHROMYCIN 500 MG/1
500 TABLET, FILM COATED ORAL DAILY
Qty: 7 TABLET | Refills: 0 | Status: SHIPPED | OUTPATIENT
Start: 2023-12-08 | End: 2023-12-15

## 2023-12-08 ASSESSMENT — ENCOUNTER SYMPTOMS
CONSTIPATION: 0
SHORTNESS OF BREATH: 0
FACIAL SWELLING: 0
NAUSEA: 0
TROUBLE SWALLOWING: 0
APNEA: 0
DIARRHEA: 0
EYE REDNESS: 0
ABDOMINAL PAIN: 0
COUGH: 0
SORE THROAT: 0
EYE ITCHING: 0
RECTAL PAIN: 0
EYE PAIN: 0
BACK PAIN: 0
SINUS PAIN: 0
EYE DISCHARGE: 0
WHEEZING: 0
VOICE CHANGE: 0
ABDOMINAL DISTENTION: 0
BLOOD IN STOOL: 0
RHINORRHEA: 0
CHEST TIGHTNESS: 0
PHOTOPHOBIA: 0
SINUS PRESSURE: 0
VOMITING: 0
COLOR CHANGE: 0

## 2023-12-09 RX ORDER — ALBUTEROL SULFATE 90 UG/1
2 AEROSOL, METERED RESPIRATORY (INHALATION) EVERY 6 HOURS PRN
Qty: 1 EACH | Refills: 1 | Status: SHIPPED | OUTPATIENT
Start: 2023-12-09

## 2023-12-16 DIAGNOSIS — R73.9 HYPERGLYCEMIA: ICD-10-CM

## 2023-12-16 DIAGNOSIS — I10 ESSENTIAL HYPERTENSION: Primary | ICD-10-CM

## 2023-12-16 DIAGNOSIS — E78.5 HYPERLIPIDEMIA, UNSPECIFIED HYPERLIPIDEMIA TYPE: ICD-10-CM

## 2024-02-22 ENCOUNTER — PATIENT MESSAGE (OUTPATIENT)
Dept: OBGYN CLINIC | Age: 69
End: 2024-02-22

## 2024-02-22 NOTE — TELEPHONE ENCOUNTER
From: Jeannine Gonzalez  To: Dr. Alexis Figueroa  Sent: 2/22/2024 9:23 AM EST  Subject: Medication refill     Please call in a refill for my estadiol 0.05 mg/day 24 patches per box at [facility]

## 2024-02-23 RX ORDER — ESTRADIOL 0.05 MG/D
1 PATCH TRANSDERMAL WEEKLY
Qty: 24 PATCH | Refills: 1 | Status: SHIPPED | OUTPATIENT
Start: 2024-02-23

## 2024-03-21 RX ORDER — CLOTRIMAZOLE AND BETAMETHASONE DIPROPIONATE 10; .64 MG/G; MG/G
CREAM TOPICAL
Qty: 30 G | Refills: 2 | Status: SHIPPED | OUTPATIENT
Start: 2024-03-21

## 2024-03-25 NOTE — PROGRESS NOTES
2024    Jeannine Gonzalez (:  1955) is a 69 y.o. female, here for evaluation of the following medical concerns:    Hypertension  Pertinent negatives include no chest pain, headaches, neck pain, palpitations or shortness of breath.     67-year-old female with a history of essential hypertension, hypothyroidism and hyperlipidemia presents with a complaint of insomnia and obesity.         Obesity: The patient's body mass index is presently 32.84   Jeannine is s/p TRKR.  She is experiencing numbness localized to the right knee.  She voices no complaints.        Hyperglycemia: Hemoglobin A1c was 5.8 on 2024.      Hypertension:compliant with Norvasc.  The patient's blood pressure is well-controlled at this time.        Menopausal symptoms: The patient symptoms are well controlled via estradiol which she takes        Hyperlipidemia: In regards to her hyperlipidemia the patient is compliant with pravastatin 40 mg orally daily      Hypothyroidism: In regards to her hypothyroidism the patient is compliant with Synthroid 150 mcg daily.  Thyroid studies were within normal limits when evaulated in 2024.          At present he denies polyuria,  Polydipsia, constitutional, sinus, visual, cardiopulmonary, urologic, gastrointestinal, immunologic/hematologic, musculoskeletal, neurologic,dermatologic, or psychiatric complaints.          Review of Systems   Constitutional:  Negative for chills, diaphoresis, fatigue and fever.   HENT:  Negative for congestion, dental problem, drooling, ear discharge, ear pain, facial swelling, hearing loss, mouth sores, nosebleeds, postnasal drip, rhinorrhea, sinus pressure, sinus pain, sneezing, sore throat, tinnitus, trouble swallowing and voice change.    Eyes:  Negative for photophobia, pain, discharge, redness, itching and visual disturbance.   Respiratory:  Negative for apnea, cough, chest tightness, shortness of breath and wheezing.    Cardiovascular:  Negative for

## 2024-03-28 DIAGNOSIS — E78.5 HYPERLIPIDEMIA, UNSPECIFIED HYPERLIPIDEMIA TYPE: ICD-10-CM

## 2024-03-28 DIAGNOSIS — I10 ESSENTIAL HYPERTENSION: ICD-10-CM

## 2024-03-28 DIAGNOSIS — E03.9 HYPOTHYROIDISM, UNSPECIFIED TYPE: Primary | ICD-10-CM

## 2024-03-28 DIAGNOSIS — R73.9 HYPERGLYCEMIA: ICD-10-CM

## 2024-03-28 LAB
ALBUMIN SERPL-MCNC: 4.4 G/DL (ref 3.5–4.6)
ALP SERPL-CCNC: 59 U/L (ref 40–130)
ALT SERPL-CCNC: 33 U/L (ref 0–33)
ANION GAP SERPL CALCULATED.3IONS-SCNC: 13 MEQ/L (ref 9–15)
AST SERPL-CCNC: 25 U/L (ref 0–35)
BASOPHILS # BLD: 0.1 K/UL (ref 0–0.2)
BASOPHILS NFR BLD: 1 %
BILIRUB SERPL-MCNC: 0.3 MG/DL (ref 0.2–0.7)
BUN SERPL-MCNC: 17 MG/DL (ref 8–23)
CALCIUM SERPL-MCNC: 9.2 MG/DL (ref 8.5–9.9)
CHLORIDE SERPL-SCNC: 106 MEQ/L (ref 95–107)
CHOLEST SERPL-MCNC: 166 MG/DL (ref 0–199)
CO2 SERPL-SCNC: 21 MEQ/L (ref 20–31)
CREAT SERPL-MCNC: 0.98 MG/DL (ref 0.5–0.9)
EOSINOPHIL # BLD: 0.2 K/UL (ref 0–0.7)
EOSINOPHIL NFR BLD: 2.7 %
ERYTHROCYTE [DISTWIDTH] IN BLOOD BY AUTOMATED COUNT: 13.3 % (ref 11.5–14.5)
GLOBULIN SER CALC-MCNC: 2.8 G/DL (ref 2.3–3.5)
GLUCOSE SERPL-MCNC: 101 MG/DL (ref 70–99)
HBA1C MFR BLD: 5.8 % (ref 4.8–5.9)
HCT VFR BLD AUTO: 45.2 % (ref 37–47)
HDLC SERPL-MCNC: 40 MG/DL (ref 40–59)
HGB BLD-MCNC: 14.8 G/DL (ref 12–16)
LDLC SERPL CALC-MCNC: 104 MG/DL (ref 0–129)
LYMPHOCYTES # BLD: 2.2 K/UL (ref 1–4.8)
LYMPHOCYTES NFR BLD: 30.3 %
MCH RBC QN AUTO: 29.5 PG (ref 27–31.3)
MCHC RBC AUTO-ENTMCNC: 32.7 % (ref 33–37)
MCV RBC AUTO: 90.2 FL (ref 79.4–94.8)
MONOCYTES # BLD: 0.5 K/UL (ref 0.2–0.8)
MONOCYTES NFR BLD: 6.4 %
NEUTROPHILS # BLD: 4.2 K/UL (ref 1.4–6.5)
NEUTS SEG NFR BLD: 59.3 %
PLATELET # BLD AUTO: 318 K/UL (ref 130–400)
POTASSIUM SERPL-SCNC: 4.8 MEQ/L (ref 3.4–4.9)
PROT SERPL-MCNC: 7.2 G/DL (ref 6.3–8)
RBC # BLD AUTO: 5.01 M/UL (ref 4.2–5.4)
SODIUM SERPL-SCNC: 140 MEQ/L (ref 135–144)
T3 FREE: 2.6 PG/ML (ref 2–4.4)
T4 FREE SERPL-MCNC: 1.12 NG/DL (ref 0.84–1.68)
TRIGL SERPL-MCNC: 109 MG/DL (ref 0–150)
TSH SERPL-MCNC: 2.44 UIU/ML (ref 0.44–3.86)
WBC # BLD AUTO: 7.1 K/UL (ref 4.8–10.8)

## 2024-04-03 SDOH — ECONOMIC STABILITY: INCOME INSECURITY: HOW HARD IS IT FOR YOU TO PAY FOR THE VERY BASICS LIKE FOOD, HOUSING, MEDICAL CARE, AND HEATING?: NOT HARD AT ALL

## 2024-04-03 SDOH — ECONOMIC STABILITY: FOOD INSECURITY: WITHIN THE PAST 12 MONTHS, THE FOOD YOU BOUGHT JUST DIDN'T LAST AND YOU DIDN'T HAVE MONEY TO GET MORE.: NEVER TRUE

## 2024-04-03 SDOH — ECONOMIC STABILITY: FOOD INSECURITY: WITHIN THE PAST 12 MONTHS, YOU WORRIED THAT YOUR FOOD WOULD RUN OUT BEFORE YOU GOT MONEY TO BUY MORE.: NEVER TRUE

## 2024-04-03 ASSESSMENT — PATIENT HEALTH QUESTIONNAIRE - PHQ9
1. LITTLE INTEREST OR PLEASURE IN DOING THINGS: NOT AT ALL
SUM OF ALL RESPONSES TO PHQ9 QUESTIONS 1 & 2: 0
SUM OF ALL RESPONSES TO PHQ QUESTIONS 1-9: 0
SUM OF ALL RESPONSES TO PHQ9 QUESTIONS 1 & 2: 0
SUM OF ALL RESPONSES TO PHQ QUESTIONS 1-9: 0
2. FEELING DOWN, DEPRESSED OR HOPELESS: NOT AT ALL
SUM OF ALL RESPONSES TO PHQ QUESTIONS 1-9: 0
1. LITTLE INTEREST OR PLEASURE IN DOING THINGS: NOT AT ALL
2. FEELING DOWN, DEPRESSED OR HOPELESS: NOT AT ALL
SUM OF ALL RESPONSES TO PHQ QUESTIONS 1-9: 0

## 2024-04-04 ENCOUNTER — OFFICE VISIT (OUTPATIENT)
Dept: FAMILY MEDICINE CLINIC | Age: 69
End: 2024-04-04

## 2024-04-04 VITALS
OXYGEN SATURATION: 96 % | HEIGHT: 68 IN | BODY MASS INDEX: 33.19 KG/M2 | HEART RATE: 84 BPM | DIASTOLIC BLOOD PRESSURE: 80 MMHG | SYSTOLIC BLOOD PRESSURE: 122 MMHG | RESPIRATION RATE: 14 BRPM | WEIGHT: 219 LBS

## 2024-04-04 DIAGNOSIS — E78.5 HYPERLIPIDEMIA, UNSPECIFIED HYPERLIPIDEMIA TYPE: ICD-10-CM

## 2024-04-04 DIAGNOSIS — E03.9 HYPOTHYROIDISM, UNSPECIFIED TYPE: ICD-10-CM

## 2024-04-04 DIAGNOSIS — I10 ESSENTIAL HYPERTENSION: Primary | ICD-10-CM

## 2024-04-09 RX ORDER — LEVOTHYROXINE SODIUM 0.15 MG/1
150 TABLET ORAL DAILY
Qty: 90 TABLET | Refills: 2 | OUTPATIENT
Start: 2024-04-09

## 2024-04-09 NOTE — TELEPHONE ENCOUNTER
Please approve or deny request. Thank you!    Rx requested:  Requested Prescriptions     Pending Prescriptions Disp Refills    levothyroxine (SYNTHROID) 150 MCG tablet [Pharmacy Med Name: LEVOTHYROXINE 150 MCG TABLET] 90 tablet 2     Sig: TAKE 1 TABLET BY MOUTH EVERY DAY         Last Office Visit:   4/4/2024      Next Visit Date:  Future Appointments   Date Time Provider Department Center   7/5/2024  8:45 AM Favian Hyman MD [facility]   7/12/2024  8:30 AM Alexis Figueroa DO [facility]   8/2/2024  9:15 AM Jm Mayes MD [place]

## 2024-04-09 NOTE — TELEPHONE ENCOUNTER
Called pt, pt stated that she currently does not need any refills.   She takes 175mcg on Mondays and 150mcg remaining of the week.    No refills needed at this time

## 2024-05-08 ENCOUNTER — HOSPITAL ENCOUNTER (OUTPATIENT)
Dept: RADIOLOGY | Facility: HOSPITAL | Age: 69
Discharge: HOME | End: 2024-05-08
Payer: MEDICARE

## 2024-05-08 ENCOUNTER — OFFICE VISIT (OUTPATIENT)
Dept: ORTHOPEDIC SURGERY | Facility: CLINIC | Age: 69
End: 2024-05-08
Payer: MEDICARE

## 2024-05-08 DIAGNOSIS — M25.512 LEFT SHOULDER PAIN, UNSPECIFIED CHRONICITY: ICD-10-CM

## 2024-05-08 DIAGNOSIS — M25.812 IMPINGEMENT OF LEFT SHOULDER: ICD-10-CM

## 2024-05-08 DIAGNOSIS — M25.812 IMPINGEMENT OF LEFT SHOULDER: Primary | ICD-10-CM

## 2024-05-08 PROCEDURE — 99214 OFFICE O/P EST MOD 30 MIN: CPT | Performed by: ORTHOPAEDIC SURGERY

## 2024-05-08 PROCEDURE — 73030 X-RAY EXAM OF SHOULDER: CPT | Mod: LT

## 2024-05-08 PROCEDURE — 73030 X-RAY EXAM OF SHOULDER: CPT | Mod: LEFT SIDE | Performed by: RADIOLOGY

## 2024-05-08 PROCEDURE — 20610 DRAIN/INJ JOINT/BURSA W/O US: CPT | Performed by: ORTHOPAEDIC SURGERY

## 2024-05-08 RX ORDER — LIDOCAINE HYDROCHLORIDE 10 MG/ML
5 INJECTION INFILTRATION; PERINEURAL
Status: COMPLETED | OUTPATIENT
Start: 2024-05-08 | End: 2024-05-08

## 2024-05-08 RX ORDER — BETAMETHASONE SODIUM PHOSPHATE AND BETAMETHASONE ACETATE 3; 3 MG/ML; MG/ML
12 INJECTION, SUSPENSION INTRA-ARTICULAR; INTRALESIONAL; INTRAMUSCULAR; SOFT TISSUE
Status: COMPLETED | OUTPATIENT
Start: 2024-05-08 | End: 2024-05-08

## 2024-05-08 RX ADMIN — BETAMETHASONE SODIUM PHOSPHATE AND BETAMETHASONE ACETATE 12 MG: 3; 3 INJECTION, SUSPENSION INTRA-ARTICULAR; INTRALESIONAL; INTRAMUSCULAR; SOFT TISSUE at 15:17

## 2024-05-08 RX ADMIN — LIDOCAINE HYDROCHLORIDE 5 ML: 10 INJECTION INFILTRATION; PERINEURAL at 15:17

## 2024-05-08 NOTE — PROGRESS NOTES
History: Sophia is here for her left shoulder.  She has had pain for the last several weeks in the arm.  She denies any acute trauma but does do a lot of exercises and was lifting her grandchild which may have irritated the arm.  It hurts mainly on the outside of the arm at times.  She takes occasional Tylenol.    Past medical history: Multiple  Medications: Multiple  Allergies: No known drug allergies    Please refer to the intake H&P regarding the patient's review of systems, family history and social history as was done today    HEENT: Normal  Lungs: Clear to auscultation  Heart: RRR  Abdomen: Soft, nontender  Skin: clear  Extremity: She get the arm overhead with significant pain.  She has 5-5 abduction and supraspinatus strength with pain during stressing.  She has 5 out of 5 rotational strength again with pain.  No passive tightness or stiffness.  No numbness or tingling.  There is pain with impingement testing.  Contralateral exam is normal for strength, motion, stability and neurovascular assessment.    Radiographs: X-rays of left shoulder are essentially negative.  There is some AC joint spurring.    Assessment: Left shoulder strain/impingement, cannot rule out further internal derangement    Plan: We discussed several options and she would like to try an injection and therapy program.  She has decent strength and motion and hopefully if improving we can treat her conservatively.  If not improved at follow-up we will proceed with further imaging.  She will try to avoid any heavy or overhead lifting for the time being.  L Inj/Asp: L subacromial bursa on 5/8/2024 3:17 PM  Indications: pain  Details: 22 G needle, posterior approach  Medications: 12 mg betamethasone acet,sod phos 6 mg/mL; 5 mL lidocaine 10 mg/mL (1 %)  Outcome: tolerated well, no immediate complications  Procedure, treatment alternatives, risks and benefits explained, specific risks discussed. Consent was given by the patient. Immediately  prior to procedure a time out was called to verify the correct patient, procedure, equipment, support staff and site/side marked as required. Patient was prepped and draped in the usual sterile fashion.          All questions were answered today with the patient.    This note was generated with voice recognition software and may contain grammatical errors.

## 2024-05-15 ENCOUNTER — EVALUATION (OUTPATIENT)
Dept: PHYSICAL THERAPY | Facility: HOSPITAL | Age: 69
End: 2024-05-15
Payer: MEDICARE

## 2024-05-15 DIAGNOSIS — M25.812 IMPINGEMENT OF LEFT SHOULDER: ICD-10-CM

## 2024-05-15 PROCEDURE — 97161 PT EVAL LOW COMPLEX 20 MIN: CPT | Mod: GP

## 2024-05-15 PROCEDURE — 97110 THERAPEUTIC EXERCISES: CPT | Mod: GP

## 2024-05-15 ASSESSMENT — PAIN SCALES - GENERAL: PAINLEVEL_OUTOF10: 4

## 2024-05-15 ASSESSMENT — PAIN DESCRIPTION - DESCRIPTORS: DESCRIPTORS: ACHING

## 2024-05-15 ASSESSMENT — PAIN - FUNCTIONAL ASSESSMENT: PAIN_FUNCTIONAL_ASSESSMENT: 0-10

## 2024-05-15 NOTE — PROGRESS NOTES
Physical Therapy    Physical Therapy Evaluation and Treatment      Patient Name: Sophia Granados  MRN: [id number]  Today's Date: 5/15/2024  Time Calculation  Start Time: 0750  Stop Time: 0825  Time Calculation (min): 35 min    Insurance:  20% COINS/240 DED WITH UNLIMITED   VISITS/BMN/KX MOD AFTER 20 VISITS/PA NOT RQRD/ MED MUTUAL WILL COVER PART B COIN   AFTER DED REMAINING 116.99 IS MET EPRYOR 05/08/24     Visit: 1    Assessment:  PT Assessment  PT Assessment Results: Decreased strength, Decreased range of motion, Decreased endurance, Decreased mobility, Pain, Orthopedic restrictions  Rehab Prognosis: Good  Evaluation/Treatment Tolerance: Patient limited by pain     Patient is a 69 year old female with c/o L shoulder pain x1 month with insidious onset. She was diagnosed with shoulder impingement. 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 She demonstrates decreased L shoulder P/AROM with pain at end range. L shoulder is demonstrates weakness as well with positive impingement symptoms. She was educated on gentle ROM exercises this date. Cues to not push into pain with understanding. Therapeutic exercise tape applied to L shoulder for pain relief. Physical therapy to focus on improving L shoulder ROM and strength to improve functional use to allow her to return to PLOF.     Plan:  OP PT Plan  Treatment/Interventions: Cryotherapy, Dry needling, Education/ Instruction, Manual therapy, Neuromuscular re-education, Self care/ home management, Taping techniques, Therapeutic activities, Therapeutic exercises, Vasopneumatic device  PT Plan: Skilled PT  PT Frequency:  (1-2x/week)  Duration: 4 weeks  Onset Date: 05/15/24  Certification Period Start Date: 05/15/24  Certification Period End Date: 08/13/24  Rehab Potential: Good  Plan of Care Agreement: Patient    Current Problem:   1. Impingement of left shoulder  Referral to Physical Therapy          Subjective    General:  General  Reason for  "Referral: Left shoulder impingement  Referred By: Dr. Haile Lorenz  Past Medical History Relevant to Rehab: HTN, B TKA, hypothyroidism, gallbladder sx    Patient is a 69 year old female with c/o L shoulder pain. 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 She also attends the gym 5 days a week. Symptoms have been ongiong for 1 month. She notes difficulty with reaching across for the seat belt, reaching overhead, lifting, carrying, pain with carrying   She had a cortisone injection 1 week ago has helped with pain.   Sleeping has been uncomfortable.   She describes pain as a \"bad bad ache.\" She had a few days with L sided cervical pain prior to the injection which has almost resolved     Left hand dominant    Goals: \"get rid of this pain\"     Precautions:  Precautions  Precautions Comment: no heavy overhead lifting       Pain:  Pain Assessment  Pain Assessment: 0-10  Pain Score: 4  Pain Type: Acute pain  Pain Location: Shoulder  Pain Orientation: Left  Pain Descriptors: Aching       Prior Level of Function: drives, retired, goes to the gym, independent with ADLs       Objective           PALPATION: L bicep, RTC insertion             POSTURE: forward head, rounded shoulder    AROM    Right shoulder flexion: WFL      Left shoulder flexion: 118  P+    Right shoulder abduction: WFL      Left shoulder abduction: 104  P+      Right shoulder ER: WFL     Left shoulder ER: 52  P+    Right shoulder IR: WLF     Left shoulder IR: T8  P+       MMT    Deltoid flexion right: 4/5     Deltoid flexion left: 2+/5      Deltoid abduction right: 4/5      Deltoid abduction left: 2+/5      ER @ 0 degrees abduction right: 4/5     ER @ 0 degrees abduction left: 3+/5      IR @ 0 degrees abduction right: 4/5     IR @ 0 degrees abduction left: 4-/5        Special Tests              Biceps Load ll: Left +        [place]'s:Left +        Neer: Left +    [place]: Left +      Outcome Measures:  Other " Measures  Disability of Arm Shoulder Hand (DASH): 29 (40.90-Quick DASH)     Treatments:  AAROM supine shoulder flexion/ER: x10  Table slide incline F/scaption: x10  Table ABC: 1x    EDUCATION:  Outpatient Education  Individual(s) Educated: Patient  Education Provided: Home Exercise Program, Post-Op Precautions  Access code: [email or web address]    Goals:  Patient will be independent with HEP  Patient will demonstrate L shoulder strength >/=4/5 to improve functional use  Patient will demonstrate L shoulder ROM WFL to be able to reach into overhead cabinets  Patient will demonstrate >/=20% improvement in Quick DASH demonstrating improved functional use  Patient will report L shoulder pain </=2/10 with activity

## 2024-05-28 ENCOUNTER — TREATMENT (OUTPATIENT)
Dept: PHYSICAL THERAPY | Facility: HOSPITAL | Age: 69
End: 2024-05-28
Payer: MEDICARE

## 2024-05-28 DIAGNOSIS — M25.812 IMPINGEMENT OF LEFT SHOULDER: Primary | ICD-10-CM

## 2024-05-28 PROCEDURE — 97140 MANUAL THERAPY 1/> REGIONS: CPT | Mod: GP

## 2024-05-28 PROCEDURE — 97110 THERAPEUTIC EXERCISES: CPT | Mod: GP

## 2024-05-28 RX ORDER — LEVOTHYROXINE SODIUM 0.15 MG/1
150 TABLET ORAL DAILY
Qty: 90 TABLET | Refills: 2 | Status: SHIPPED | OUTPATIENT
Start: 2024-05-28

## 2024-05-28 RX ORDER — AMLODIPINE BESYLATE 5 MG/1
TABLET ORAL
Qty: 90 TABLET | Refills: 1 | Status: SHIPPED | OUTPATIENT
Start: 2024-05-28

## 2024-05-28 ASSESSMENT — PAIN SCALES - GENERAL: PAINLEVEL_OUTOF10: 7

## 2024-05-28 ASSESSMENT — PAIN - FUNCTIONAL ASSESSMENT: PAIN_FUNCTIONAL_ASSESSMENT: 0-10

## 2024-05-28 ASSESSMENT — PAIN DESCRIPTION - DESCRIPTORS: DESCRIPTORS: ACHING;BURNING

## 2024-05-28 NOTE — PROGRESS NOTES
Physical Therapy    Physical Therapy Treatment    Patient Name: Sophia Granados  MRN: [id number]    Today's Date: 5/28/2024  Time Calculation  Start Time: 0700  Stop Time: 0744  Time Calculation (min): 44 min     PT Therapeutic Procedures Time Entry  Manual Therapy Time Entry: 25  Therapeutic Exercise Time Entry: 19                 Insurance:  20% COINS/240 DED WITH UNLIMITED   VISITS/BMN/KX MOD AFTER 20 VISITS/PA NOT RQRD/ MED MUTUAL WILL COVER PART B COIN   AFTER DED REMAINING 116.99 IS MET EPRYOR 05/08/24      Visit: 2       Assessment:   Patient with palpable trigger points to L UT and L bicep. Discussed possible benefit of dry needling. Patient would like to proceed next visit. STM performed to improve tissue mobility. Increased pain with AAROM wand exercise and therefore held. Added supine cervical exercises this date d/t tightness. Slight relief following treatment.  Plan:   Dry needling     Current Problem  1. Impingement of left shoulder            General   Patient notes that the injection has worn off. Pain returned ~2 days after her initial evaluation with PT and symptoms have been consistent since. Pain has returned to what it was prior to the injection. She is also having pain into her L neck. She had some tingling into her L forearm on lateral aspect. This was short lasting (30 seconds) and she was sitting in her chair. While doing HEP she has initial onset of pain, however this does improve somewhat which lasts for ~1 hour and then she gets achy and stiff again.     Subjective    Precautions  Precautions  Precautions Comment: no heavy overhead lifting       Pain  Pain Assessment  Pain Assessment: 0-10  Pain Score: 7  Pain Type: Acute pain  Pain Location: Shoulder (and neck)  Pain Orientation: Left  Pain Descriptors: Aching, Burning    Objective     Treatments:  AAROM supine shoulder flexion/ER: x10  Table slide incline F/scaption: x10  Table ABC: 1x  Supine nods: x10  Supine cervical retraction:  x10    Manual: STM L UT, levator scap, cervical paraspine, L bicep  Therapeutic exercise tape L shoulder and astrick on bicep. Patient denies adverse reactions to application of tape. Educated patient that tape will last 3-5 days, ok to bathe and pat dry. Remove immediately if adverse reaction occurs such as blister, redness, itchiness. Patient reports understanding.

## 2024-05-28 NOTE — TELEPHONE ENCOUNTER
Rx requested:  Requested Prescriptions     Pending Prescriptions Disp Refills    amLODIPine (NORVASC) 5 MG tablet [Pharmacy Med Name: AMLODIPINE BESYLATE 5 MG TAB] 90 tablet 1     Sig: TAKE 1 TABLET BY MOUTH EVERY DAY    levothyroxine (SYNTHROID) 150 MCG tablet [Pharmacy Med Name: LEVOTHYROXINE 150 MCG TABLET] 90 tablet 2     Sig: TAKE 1 TABLET BY MOUTH EVERY DAY         Last Office Visit:   4/4/2024      Next Visit Date:  Future Appointments   Date Time Provider Department Center   7/5/2024  8:45 AM Favian Hyman MD [facility]   7/12/2024  8:30 AM Alexis Figueroa DO [facility]   8/2/2024  9:15 AM Jm Mayes MD [place] [facility] Padilla

## 2024-05-30 ENCOUNTER — TREATMENT (OUTPATIENT)
Dept: PHYSICAL THERAPY | Facility: HOSPITAL | Age: 69
End: 2024-05-30
Payer: MEDICARE

## 2024-05-30 DIAGNOSIS — M25.812 IMPINGEMENT OF LEFT SHOULDER: Primary | ICD-10-CM

## 2024-05-30 PROCEDURE — 97110 THERAPEUTIC EXERCISES: CPT | Mod: GP

## 2024-05-30 PROCEDURE — 97140 MANUAL THERAPY 1/> REGIONS: CPT | Mod: GP

## 2024-05-30 ASSESSMENT — PAIN SCALES - GENERAL: PAINLEVEL_OUTOF10: 7

## 2024-05-30 ASSESSMENT — PAIN DESCRIPTION - DESCRIPTORS: DESCRIPTORS: ACHING;BURNING

## 2024-05-30 ASSESSMENT — PAIN - FUNCTIONAL ASSESSMENT: PAIN_FUNCTIONAL_ASSESSMENT: 0-10

## 2024-05-30 NOTE — PROGRESS NOTES
Physical Therapy    Physical Therapy Treatment    Patient Name: Sophia Granados  MRN: [id number]    Today's Date: 5/30/2024  Time Calculation  Start Time: 0830  Stop Time: 0910  Time Calculation (min): 40 min     PT Therapeutic Procedures Time Entry  Manual Therapy Time Entry: 15  Therapeutic Exercise Time Entry: 25        Insurance:  20% COINS/240 DED WITH UNLIMITED   VISITS/BMN/KX MOD AFTER 20 VISITS/PA NOT RQRD/ MED MUTUAL WILL COVER PART B COIN   AFTER DED REMAINING 116.99 IS MET EPRYOR 05/08/24      Visit: 3             Assessment:   Intiated with dry needling followed by STM. Most muscle twitching noted to R UT vs L UT. Encouraged use of heat upon returning home and educated on DOMS. Able to progress AAROM exercises to more gravity resisted with some increased pain. Initiated gentle strengthening with bicep pain during LAE. HEP updated  Plan:   Continue with progression of shoulder exercises    Current Problem  1. Impingement of left shoulder            General   Patient notes continued pain with L shoulder       Subjective    Precautions  Precautions  Precautions Comment: no heavy overhead lifting       Pain  Pain Assessment  Pain Assessment: 0-10  Pain Score: 7  Pain Type: Acute pain  Pain Location: Shoulder  Pain Orientation: Left  Pain Descriptors: Aching, Burning    Objective     Treatments:  AAROM standing shoulder flexion/ER/scaption/IR/ext: x10  Wall slide F/scaption: x10  Wall ABC: 1x  Row/LAE: purple TB x10 each         Manual:  Dry needling performed this date to area B UT .  Informed consent obtained from patient and in medical chart. Area assessed for cleanliness.    30 mm  needles inserted,   pistoning, 4 per side    technique performed.    all needles removed, area inspected for adverse symptoms, none noted, patient educated in post- dry needling management and expected symptoms from treatment. all patient questions answered.

## 2024-06-03 ENCOUNTER — TREATMENT (OUTPATIENT)
Dept: PHYSICAL THERAPY | Facility: HOSPITAL | Age: 69
End: 2024-06-03
Payer: MEDICARE

## 2024-06-03 DIAGNOSIS — M25.812 IMPINGEMENT OF LEFT SHOULDER: Primary | ICD-10-CM

## 2024-06-03 PROCEDURE — 97110 THERAPEUTIC EXERCISES: CPT | Mod: GP

## 2024-06-03 PROCEDURE — 97140 MANUAL THERAPY 1/> REGIONS: CPT | Mod: GP

## 2024-06-03 ASSESSMENT — PAIN SCALES - GENERAL: PAINLEVEL_OUTOF10: 7

## 2024-06-03 ASSESSMENT — PAIN DESCRIPTION - DESCRIPTORS: DESCRIPTORS: ACHING

## 2024-06-03 ASSESSMENT — PAIN - FUNCTIONAL ASSESSMENT: PAIN_FUNCTIONAL_ASSESSMENT: 0-10

## 2024-06-03 NOTE — PROGRESS NOTES
Physical Therapy    Physical Therapy Treatment    Patient Name: Sophia Granados  MRN: [id number]    Today's Date: 6/3/2024  Time Calculation  Start Time: 0830  Stop Time: 0910  Time Calculation (min): 40 min     PT Therapeutic Procedures Time Entry  Manual Therapy Time Entry: 8  Therapeutic Exercise Time Entry: 32                 Insurance:  20% COINS/240 DED WITH UNLIMITED   VISITS/BMN/KX MOD AFTER 20 VISITS/PA NOT RQRD/ MED MUTUAL WILL COVER PART B COIN   AFTER DED REMAINING 116.99 IS MET EPRYOR 05/08/24      Visit: 4    Assessment:   Held on dry needling this date per patient request. Progressed exercises to improve stability of L shoulder. She continues to have pain in bicep with eccentric motion. IASTM performed to L bicep. Educated on trial hot and cold for pain relief at home with understanding.   Plan:   Continue to improve strength and stability of L shoulder to decrease pain.    Current Problem  1. Impingement of left shoulder            General   Patient notes that she was ill over the weekend she had significant increased pain. 10/10.  Symptoms were going down her entire arm.        Subjective    Precautions  Precautions  Precautions Comment: no heavy overhead lifting    Pain  Pain Assessment  Pain Assessment: 0-10  Pain Score: 7  Pain Type: Acute pain  Pain Location: Shoulder  Pain Orientation: Left  Pain Descriptors: Aching    Objective     Treatments:  UBE: attempted however pain with returning arm  AAROM standing shoulder flexion/ER/scaption/IR/ext: x10  Wall slide F/scaption: 2x10  Wall ABC: 1x  Ball on wall: 10x each   Row/LAE: purple TB ---  Jobes: painful  Bent shoulder row/LAE/T: x10 (T painful)   Pec stretch: held painful           Manual:   IASTM L bicep

## 2024-06-06 ENCOUNTER — TREATMENT (OUTPATIENT)
Dept: PHYSICAL THERAPY | Facility: HOSPITAL | Age: 69
End: 2024-06-06
Payer: MEDICARE

## 2024-06-06 DIAGNOSIS — M25.812 IMPINGEMENT OF LEFT SHOULDER: Primary | ICD-10-CM

## 2024-06-06 PROCEDURE — 97110 THERAPEUTIC EXERCISES: CPT | Mod: GP

## 2024-06-06 PROCEDURE — 97140 MANUAL THERAPY 1/> REGIONS: CPT | Mod: GP

## 2024-06-06 ASSESSMENT — PAIN - FUNCTIONAL ASSESSMENT: PAIN_FUNCTIONAL_ASSESSMENT: 0-10

## 2024-06-06 ASSESSMENT — PAIN DESCRIPTION - DESCRIPTORS: DESCRIPTORS: ACHING

## 2024-06-06 ASSESSMENT — PAIN SCALES - GENERAL: PAINLEVEL_OUTOF10: 2

## 2024-06-06 NOTE — PROGRESS NOTES
Physical Therapy    Physical Therapy Treatment    Patient Name: Sophia Granados  MRN: [id number]    Today's Date: 6/6/2024  Time Calculation  Start Time: 0745  Stop Time: 0826  Time Calculation (min): 41 min     PT Therapeutic Procedures Time Entry  Manual Therapy Time Entry: 15  Therapeutic Exercise Time Entry: 26                 Insurance:  20% COINS/240 DED WITH UNLIMITED   VISITS/BMN/KX MOD AFTER 20 VISITS/PA NOT RQRD/ MED MUTUAL WILL COVER PART B COIN   AFTER DED REMAINING 116.99 IS MET EPRYOR 05/08/24      Visit: 5    Assessment:   Continued with dry needling this date with much increased tightness to R UT compared to L side. She will likely have increased soreness on R side. Introduced side lying shoulder exercises, however painful with abduction and flexion. Progressed supine exercises to improve stability of L shoulder. Patient is following up with referring provider. Plans to monitor pain and if pain continues to remain improved she will schedule follow up PT. If pain returns will await further imaging.   Plan:   Hold for follow up with referring provider     Current Problem  1. Impingement of left shoulder            General   Patient notes that her L shoulder is feeling better and it is the best that she has been feeling in the past 5 weeks.        Subjective    Precautions  Precautions  Precautions Comment: no heavy overhead lifting       Pain  Pain Assessment  Pain Assessment: 0-10  Pain Score: 2  Pain Type: Acute pain  Pain Location: Shoulder  Pain Orientation: Left  Pain Descriptors: Aching    Objective     Treatments:  Side lying shoulder ER: 20x  Side lying shoulder abduction: +painful  Side lying shoulder horizontal abduction: 20x  Side lying shoulder flexion: x6 +painful  Supine AROM shoulder flexion: x20  Serratus punch: 2# 2x10  Supine ABC: 2# 1x    Wall slide F/scaption: ---  Wall ABC: ---  Ball on wall: 10x each   Row/LAE: purple TB ---  Jobes: painful  Bent shoulder row/LAE/T: ---  Pec  stretch: held painful           Manual:     IASTM L bicep  Dry needling performed this date to area B UT .  Informed consent obtained from patient and in medical chart. Area assessed for cleanliness.    30mm  needles inserted,   pistoning (4 needles)    all needles removed, area inspected for adverse symptoms, none noted, patient educated in post- dry needling management and expected symptoms from treatment. all patient questions answered.  Therapeutic exercise tape applied to L shoulder. Patient denies adverse reactions to application of tape. Educated patient that tape will last 3-5 days, ok to bathe and pat dry. Remove immediately if adverse reaction occurs such as blister, redness, itchiness. Patient reports understanding.

## 2024-06-07 DIAGNOSIS — N20.0 KIDNEY STONES: Primary | ICD-10-CM

## 2024-06-12 ENCOUNTER — TREATMENT (OUTPATIENT)
Dept: PHYSICAL THERAPY | Facility: HOSPITAL | Age: 69
End: 2024-06-12
Payer: MEDICARE

## 2024-06-12 ENCOUNTER — APPOINTMENT (OUTPATIENT)
Dept: ORTHOPEDIC SURGERY | Facility: CLINIC | Age: 69
End: 2024-06-12
Payer: MEDICARE

## 2024-06-12 DIAGNOSIS — M25.812 IMPINGEMENT OF LEFT SHOULDER: ICD-10-CM

## 2024-06-12 DIAGNOSIS — M25.812 IMPINGEMENT OF LEFT SHOULDER: Primary | ICD-10-CM

## 2024-06-12 PROCEDURE — 97140 MANUAL THERAPY 1/> REGIONS: CPT | Mod: GP

## 2024-06-12 PROCEDURE — 99213 OFFICE O/P EST LOW 20 MIN: CPT | Performed by: ORTHOPAEDIC SURGERY

## 2024-06-12 ASSESSMENT — PAIN - FUNCTIONAL ASSESSMENT: PAIN_FUNCTIONAL_ASSESSMENT: 0-10

## 2024-06-12 ASSESSMENT — PAIN DESCRIPTION - DESCRIPTORS: DESCRIPTORS: ACHING

## 2024-06-12 ASSESSMENT — PAIN SCALES - GENERAL: PAINLEVEL_OUTOF10: 5 - MODERATE PAIN

## 2024-06-12 NOTE — PROGRESS NOTES
Physical Therapy    Physical Therapy Treatment    Patient Name: Sophia Granados  MRN: [id number]    Today's Date: 6/12/2024  Time Calculation  Start Time: 1045  Stop Time: 1116  Time Calculation (min): 31 min     PT Therapeutic Procedures Time Entry  Manual Therapy Time Entry: 31                 Insurance:  20% COINS/240 DED WITH UNLIMITED   VISITS/BMN/KX MOD AFTER 20 VISITS/PA NOT RQRD/ MED MUTUAL WILL COVER PART B COIN   AFTER DED REMAINING 116.99 IS MET EPRYOR 05/08/24      Visit: 6    Assessment:     Plan:   Continue to focus on pain relief    Current Problem  1. Impingement of left shoulder            General   Patient was provided order for MRI        Subjective    Precautions  Precautions  Precautions Comment: no heavy overhead lifting       Pain  Pain Assessment  Pain Assessment: 0-10  Pain Score: 5 - Moderate pain  Pain Type: Acute pain  Pain Location: Shoulder  Pain Orientation: Left  Pain Descriptors: Aching    Objective       Treatments:        Manual:      IASTM L bicep  Dry needling performed this date to area L bicep .  Informed consent obtained from patient and in medical chart. Area assessed for cleanliness.    30mm  needles inserted,   pistoning (4 needles)    all needles removed, area inspected for adverse symptoms, none noted, patient educated in post- dry needling management and expected symptoms from treatment. all patient questions answered.  IASTM to L shoulder  Therapeutic exercise tape applied to L shoulder. Patient denies adverse reactions to application of tape. Educated patient that tape will last 3-5 days, ok to bathe and pat dry. Remove immediately if adverse reaction occurs such as blister, redness, itchiness. Patient reports understanding.

## 2024-06-12 NOTE — PROGRESS NOTES
History: Sophia is here for her left shoulder.  We did a cortisone injection and therapy for some shoulder impingement.  She had about 10 days of relief and now having.  It does radiate down the front of the arm.  She occasionally gets neck pain.    Past medical history: Multiple  Medications: Multiple  Allergies: No known drug allergies    Please refer to the intake H&P regarding the patient's review of systems, family history and social history as was done today    HEENT: Normal  Lungs: Clear to auscultation  Heart: RRR  Abdomen: Soft, nontender  Skin: clear  Extremity: She has trouble lifting the arm past 90 degrees but then can get it overhead.  She has 5-5 abduction strength but 4+ supraspinatus strength today.  Also pain with resisted external rotation.  There is significant pain with impingement maneuvers.  Mild pain around the biceps anteriorly with palpation.  Left-sided neck pain with rotation.  Contralateral exam is normal for strength, motion, stability and neurovascular assessment.    Radiographs: Previous x-rays are essentially negative.    Assessment: Left shoulder impingement with possible internal derangement    Plan: She actually had more pain and some weakness now compared to last month.  This point I recommended an MRI to rule out any other internal derangement.  She can certainly continue with her therapy.  She may also have a bit of a neck issue ongoing and we will see her back to discuss options.  All questions were answered today with the patient.    This note was generated with voice recognition software and may contain grammatical errors.

## 2024-06-21 ENCOUNTER — HOSPITAL ENCOUNTER (OUTPATIENT)
Dept: RADIOLOGY | Facility: HOSPITAL | Age: 69
Discharge: HOME | End: 2024-06-21
Payer: MEDICARE

## 2024-06-21 ENCOUNTER — TRANSCRIBE ORDERS (OUTPATIENT)
Dept: ADMINISTRATIVE | Age: 69
End: 2024-06-21

## 2024-06-21 DIAGNOSIS — M25.812 IMPINGEMENT OF LEFT SHOULDER: Primary | ICD-10-CM

## 2024-06-21 DIAGNOSIS — M25.812 IMPINGEMENT OF LEFT SHOULDER: ICD-10-CM

## 2024-06-26 ENCOUNTER — HOSPITAL ENCOUNTER (OUTPATIENT)
Dept: MRI IMAGING | Age: 69
Discharge: HOME | End: 2024-06-28
Payer: MEDICARE

## 2024-06-26 ENCOUNTER — APPOINTMENT (OUTPATIENT)
Dept: ORTHOPEDIC SURGERY | Facility: CLINIC | Age: 69
End: 2024-06-26
Payer: MEDICARE

## 2024-06-26 DIAGNOSIS — M25.812 IMPINGEMENT OF LEFT SHOULDER: ICD-10-CM

## 2024-06-26 PROCEDURE — 73221 MRI JOINT UPR EXTREM W/O DYE: CPT

## 2024-07-01 ENCOUNTER — APPOINTMENT (OUTPATIENT)
Dept: ORTHOPEDIC SURGERY | Facility: CLINIC | Age: 69
End: 2024-07-01
Payer: MEDICARE

## 2024-07-01 DIAGNOSIS — M25.512 LEFT SHOULDER PAIN, UNSPECIFIED CHRONICITY: Primary | ICD-10-CM

## 2024-07-01 DIAGNOSIS — E03.9 HYPOTHYROIDISM, UNSPECIFIED TYPE: Primary | ICD-10-CM

## 2024-07-01 DIAGNOSIS — I10 ESSENTIAL HYPERTENSION: ICD-10-CM

## 2024-07-01 DIAGNOSIS — M25.812 IMPINGEMENT OF LEFT SHOULDER: ICD-10-CM

## 2024-07-01 PROCEDURE — 99214 OFFICE O/P EST MOD 30 MIN: CPT | Performed by: ORTHOPAEDIC SURGERY

## 2024-07-01 RX ORDER — MELOXICAM 15 MG/1
15 TABLET ORAL DAILY
Qty: 30 TABLET | Refills: 2 | Status: SHIPPED | OUTPATIENT
Start: 2024-07-01 | End: 2024-09-29

## 2024-07-01 NOTE — PROGRESS NOTES
History: Sophia is here for her left shoulder.  She has had pain for the last few months.  She did not have a specific injury but does a lot of upper body exercises and cares for her grandchild which may have irritated the arm.  She only got about 10 days of relief with a subacromial injection and has been doing therapy.  Given her persistent pain we obtained an MRI and she is here to go over results.    Past medical history: Multiple  Medications: Multiple  Allergies: No known drug allergies    Please refer to the intake H&P regarding the patient's review of systems, family history and social history as was done today    HEENT: Normal  Lungs: Clear to auscultation  Heart: RRR  Abdomen: Soft, nontender  Skin: clear  Extremity: She can get the arm fully overhead but with pain.  She has 5 of 5 strength in all groups tested but again with pain.  There is pain with impingement overs.  She has tenderness anteriorly around the bicep.  There is no passive tightness at the side.  She denies any numbness or tingling distally.  Contralateral exam is normal for strength, motion, stability and neurovascular assessment.    Radiographs: Previous x-rays show some AC joint spurring.  MRI was reviewed showing no full-thickness cuff defect but some partial-thickness tearing to the supraspinatus.  There is moderate amount of bursitis as well.    Assessment: Left shoulder impingement with partial-thickness cuff tearing.    Plan: She does feel like the therapy is helping.  She has more visits scheduled.  She would like to try Mobic for the next few weeks.  She will stop if it causes any upset stomach.  She does have a upcoming trip in October and we did discuss repeat injection if not improving.  All questions were answered today with the patient.    For complete plan and/or surgical details, please refer to Dr. Lorenz's portion of this split/shared dictation.   Martha Flores PA-C    In a face-to-face encounter today, MELODIE Be  MD Kelechi performed a history and physical examination, discussed pertinent diagnostic studies if indicated, and discussed diagnosis and management strategies with both the patient and the midlevel provider.  I reviewed the midlevel's note and agree with the documented findings and plan of care.  Greater than 50% of the evaluation and treatment decision was performed by the physician myself during today's visit.    Sophia continues to have some shoulder impingement pain.  The injection helped significantly but it wore off.  She is willing to try some Mobic and finish out her formal therapy.  We also discussed a repeat injection before her trip this fall.  She can set up that appointment at her discretion.  We also discussed the option of a diagnostic arthroscopy and evaluation of her cuff and subacromial space if this continues to recur.  She does not feel she is yet ready for surgery.    This note was generated with voice recognition software and may contain grammatical errors.

## 2024-07-02 ENCOUNTER — TREATMENT (OUTPATIENT)
Dept: PHYSICAL THERAPY | Facility: HOSPITAL | Age: 69
End: 2024-07-02
Payer: MEDICARE

## 2024-07-02 DIAGNOSIS — M25.812 IMPINGEMENT OF LEFT SHOULDER: Primary | ICD-10-CM

## 2024-07-02 PROCEDURE — 97140 MANUAL THERAPY 1/> REGIONS: CPT | Mod: GP

## 2024-07-02 PROCEDURE — 97110 THERAPEUTIC EXERCISES: CPT | Mod: GP

## 2024-07-02 ASSESSMENT — PAIN SCALES - GENERAL: PAINLEVEL_OUTOF10: 5 - MODERATE PAIN

## 2024-07-02 ASSESSMENT — PAIN DESCRIPTION - DESCRIPTORS: DESCRIPTORS: ACHING

## 2024-07-02 ASSESSMENT — PAIN - FUNCTIONAL ASSESSMENT: PAIN_FUNCTIONAL_ASSESSMENT: 0-10

## 2024-07-02 NOTE — PROGRESS NOTES
Physical Therapy    Physical Therapy Treatment/Reassessment    Patient Name: Sophia Granados  MRN: [id number]    Today's Date: 7/2/2024  Time Calculation  Start Time: 0700  Stop Time: 0740  Time Calculation (min): 40 min     PT Therapeutic Procedures Time Entry  Manual Therapy Time Entry: 15  Therapeutic Exercise Time Entry: 25                 Insurance:  20% COINS/240 DED WITH UNLIMITED   VISITS/BMN/KX MOD AFTER 20 VISITS/PA NOT RQRD/ MED MUTUAL WILL COVER PART B COIN   AFTER DED REMAINING 116.99 IS MET EPRYOR 05/08/24      Visit: 7       Assessment:  PT Assessment  PT Assessment Results: Decreased strength, Decreased range of motion, Decreased mobility, Pain, Orthopedic restrictions  Rehab Prognosis: Good  Patient has attended 7 PT visits with recent MRI which confirms a partial RTC tear. She continues to have pain to L shoulder and notes relief following manual techniques. She demonstrates improvement with L shoulder strength and ROM compared to her initial evaluation. She is progressing toward all goals. Continue with manual techniques for pain relief. And reapplied therapeutic exercise tape for pain relief. She will continue to benefit from skilled PT to improve L shoulder ROM and strength to decrease pain and improve functional use.   Plan:  OP PT Plan  Treatment/Interventions: Cryotherapy, Dry needling, Education/ Instruction, Manual therapy, Neuromuscular re-education, Self care/ home management, Taping techniques, Therapeutic activities, Therapeutic exercises, Ultrasound  PT Plan: Skilled PT  PT Frequency: 1 time per week  Duration: 6 visits  Certification Period Start Date: 07/02/24  Certification Period End Date: 09/30/24  Rehab Potential: Good  Plan of Care Agreement: Patient    Current Problem  1. Impingement of left shoulder            General   Patient had an MRI of her left shoulder which confirmed a partial RTC . She notes good days and bad days to L shoulder over the last three weeks. She used the  garden hose and had significant pain upon  sleeping that night. She feels that the dry needling was helpful.        Subjective    Precautions  Precautions  Precautions Comment: no heavy overhead lifting    Pain  Pain Assessment  Pain Assessment: 0-10  0-10 (Numeric) Pain Score: 5 - Moderate pain  Pain Type: Acute pain  Pain Location: Shoulder  Pain Orientation: Left  Pain Descriptors: Aching    Objective     MRI L shoulder 1. Low-grade partial-thickness tearing of the articular surface fibers of the   infraspinatus.   2. Slight tendinosis of the anterior fibers of the distal supraspinatus   tendon.   3. Mild tendinosis of the subscapularis.   4. Moderate subacromial bursitis.   5. Moderate acromioclavicular osteoarthrosis, with only slight approximation   of the myotendinous junction of the supraspinatus      PALPATION: L bicep, RTC insertion                         POSTURE: forward head, rounded shoulder     AROM   Left shoulder flexion: 132  P+  Left shoulder abduction: 117  P+     Left shoulder ER: 67  P+  Left shoulder IR: T8  P+        MMT  Deltoid flexion left: 3+/5    Deltoid abduction left: 3+/5    ER @ 0 degrees abduction left: 3+/5    IR @ 0 degrees abduction left: 4-/5       Outcome Measures:  Other Measures  Disability of Arm Shoulder Hand (DASH): 22 (25.00- Quick DASH)    Treatments:  Reassessment completed    Manual:      IASNAZ L bicep  Dry needling performed this date to area L bicep .  Informed consent obtained from patient and in medical chart. Area assessed for cleanliness.    30mm  needles inserted,   pistoning (4 needles)    all needles removed, area inspected for adverse symptoms, none noted, patient educated in post- dry needling management and expected symptoms from treatment. all patient questions answered.  IASTM to L shoulder  Therapeutic exercise tape applied to L shoulder. Patient denies adverse reactions to application of tape. Educated patient that tape will last 3-5 days, ok to bathe  and pat dry. Remove immediately if adverse reaction occurs such as blister, redness, itchiness. Patient reports understanding.    AAROM shoulder flexion/CP/ER: x10-20 each     Side lying shoulder ER: ---  Side lying shoulder abduction: +painful  Side lying shoulder horizontal abduction: ---  Side lying shoulder flexion: +painful  Supine AROM shoulder flexion: ---  Serratus punch: 2# ---  Supine ABC: 2# ---     Wall slide F/scaption: ---  Wall ABC: ---  Ball on wall: 10x each   Row/LAE: purple TB ---  Jobes: painful  Bent shoulder row/LAE/T: ---  Pec stretch: held painful    OP EDUCATION:       Goals:  Patient will be independent with HEP (PROGRESSING)   Patient will demonstrate L shoulder strength >/=4/5 to improve functional use (PROGRESSING)   Patient will demonstrate L shoulder ROM WFL to be able to reach into overhead cabinets (PROGRESSING)   Patient will demonstrate >/=20% improvement in Quick DASH demonstrating improved functional use (PROGRESSING)   Patient will report L shoulder pain </=2/10 with activity  (PROGRESSING)

## 2024-07-02 NOTE — LETTER
July 2, 2024    Tere Jenkins DPT  [address] [facility], [address] [address]    Patient: Sophia Granados   YOB: 1955   Date of Visit: 7/2/2024       Dear No referring provider defined for this encounter.    The attached plan of care is being sent to you because your patient’s medical reimbursement requires that you certify the plan of care. Your signature is required to allow uninterrupted insurance coverage.      You may indicate your approval by signing below and faxing this form back to us at Dept Fax: [phone].    Please call Dept: [phone] with any questions or concerns.    Thank you for this referral,        Tere Jenkins DPT  [address] MEDICAL OFFICE [address] [address]    Payer: Payor: MEDICARE / Plan: MEDICARE PART A AND B / Product Type: *No Product type* /                                                                         Date:     Dear Tere Jenkins DPT,     Re: Ms. Sophia Granados, MRN:[id number]    I certify that I have reviewed the attached plan of care and it is medically necessary for Ms. Sophia Granados (1955) who is under my care.          ______________________________________                    _________________  Provider name and credentials                                           Date and time                                                                                           Plan of Care 7/2/24   Effective from: 7/2/2024  Effective to: 9/30/2024    Plan ID: [id number]            Participants as of Finalize on 7/2/2024    Name Type Comments Contact Info    Haile Lorenz MD Consulting Physician  [phone]    Tere Jenkins DPT Physical Therapist  [phone]       Last Plan Note     Author: Tere Jenkins DPT Status: Incomplete Last edited: 7/2/2024  7:00 AM       Physical Therapy    Physical Therapy Treatment/Reassessment    Patient Name: Sophia LISA  Darwin  MRN: [id number]    Today's Date: 7/2/2024  Time Calculation  Start Time: 0700  Stop Time: 0740  Time Calculation (min): 40 min     PT Therapeutic Procedures Time Entry  Manual Therapy Time Entry: 15  Therapeutic Exercise Time Entry: 25                 Insurance:  20% COINS/240 DED WITH UNLIMITED   VISITS/BMN/KX MOD AFTER 20 VISITS/PA NOT RQRD/ MED MUTUAL WILL COVER PART B COIN   AFTER DED REMAINING 116.99 IS MET EPRYOR 05/08/24      Visit: 7       Assessment:  PT Assessment  PT Assessment Results: Decreased strength, Decreased range of motion, Decreased mobility, Pain, Orthopedic restrictions  Rehab Prognosis: Good  Patient has attended 7 PT visits with recent MRI which confirms a partial RTC tear. She continues to have pain to L shoulder and notes relief following manual techniques. She demonstrates improvement with L shoulder strength and ROM compared to her initial evaluation. She is progressing toward all goals. Continue with manual techniques for pain relief. And reapplied therapeutic exercise tape for pain relief. She will continue to benefit from skilled PT to improve L shoulder ROM and strength to decrease pain and improve functional use.   Plan:  OP PT Plan  Treatment/Interventions: Cryotherapy, Dry needling, Education/ Instruction, Manual therapy, Neuromuscular re-education, Self care/ home management, Taping techniques, Therapeutic activities, Therapeutic exercises, Ultrasound  PT Plan: Skilled PT  PT Frequency: 1 time per week  Duration: 6 visits  Certification Period Start Date: 07/02/24  Certification Period End Date: 09/30/24  Rehab Potential: Good  Plan of Care Agreement: Patient    Current Problem  1. Impingement of left shoulder            General   Patient had an MRI of her left shoulder which confirmed a partial RTC . She notes good days and bad days to L shoulder over the last three weeks. She used the garden hose and had significant pain upon  sleeping that night. She feels that the dry  needling was helpful.        Subjective   Precautions  Precautions  Precautions Comment: no heavy overhead lifting    Pain  Pain Assessment  Pain Assessment: 0-10  0-10 (Numeric) Pain Score: 5 - Moderate pain  Pain Type: Acute pain  Pain Location: Shoulder  Pain Orientation: Left  Pain Descriptors: Aching    Objective    MRI L shoulder 1. Low-grade partial-thickness tearing of the articular surface fibers of the   infraspinatus.   2. Slight tendinosis of the anterior fibers of the distal supraspinatus   tendon.   3. Mild tendinosis of the subscapularis.   4. Moderate subacromial bursitis.   5. Moderate acromioclavicular osteoarthrosis, with only slight approximation   of the myotendinous junction of the supraspinatus      PALPATION: L bicep, RTC insertion                         POSTURE: forward head, rounded shoulder     AROM   Left shoulder flexion: 132  P+  Left shoulder abduction: 117  P+     Left shoulder ER: 67  P+  Left shoulder IR: T8  P+        MMT  Deltoid flexion left: 3+/5    Deltoid abduction left: 3+/5    ER @ 0 degrees abduction left: 3+/5    IR @ 0 degrees abduction left: 4-/5       Outcome Measures:  Other Measures  Disability of Arm Shoulder Hand (DASH): 22 (25.00- Quick DASH)    Treatments:  Reassessment completed    Manual:      IASNAZ L bicep  Dry needling performed this date to area L bicep .  Informed consent obtained from patient and in medical chart. Area assessed for cleanliness.    30mm  needles inserted,   pistoning (4 needles)    all needles removed, area inspected for adverse symptoms, none noted, patient educated in post- dry needling management and expected symptoms from treatment. all patient questions answered.  IASTM to L shoulder  Therapeutic exercise tape applied to L shoulder. Patient denies adverse reactions to application of tape. Educated patient that tape will last 3-5 days, ok to bathe and pat dry. Remove immediately if adverse reaction occurs such as blister, redness,  itchiness. Patient reports understanding.    AAROM shoulder flexion/CP/ER: x10-20 each     Side lying shoulder ER: ---  Side lying shoulder abduction: +painful  Side lying shoulder horizontal abduction: ---  Side lying shoulder flexion: +painful  Supine AROM shoulder flexion: ---  Serratus punch: 2# ---  Supine ABC: 2# ---     Wall slide F/scaption: ---  Wall ABC: ---  Ball on wall: 10x each   Row/LAE: purple TB ---  Jobes: painful  Bent shoulder row/LAE/T: ---  Pec stretch: held painful    OP EDUCATION:       Goals:  Patient will be independent with HEP (PROGRESSING)   Patient will demonstrate L shoulder strength >/=4/5 to improve functional use (PROGRESSING)   Patient will demonstrate L shoulder ROM WFL to be able to reach into overhead cabinets (PROGRESSING)   Patient will demonstrate >/=20% improvement in Quick DASH demonstrating improved functional use (PROGRESSING)   Patient will report L shoulder pain </=2/10 with activity  (PROGRESSING)               Current Participants as of 7/2/2024    Name Type Comments Contact Info    Haile Lorenz MD Consulting Physician  [phone]    Signature pending    Tere Jenkins DPT Physical Therapist  [phone]    Electronically signed by Tere Jenkins DPT at 7/2/2024 0909 EDT

## 2024-07-03 DIAGNOSIS — I10 ESSENTIAL HYPERTENSION: ICD-10-CM

## 2024-07-03 DIAGNOSIS — E03.9 HYPOTHYROIDISM, UNSPECIFIED TYPE: ICD-10-CM

## 2024-07-03 LAB
ANION GAP SERPL CALCULATED.3IONS-SCNC: 13 MEQ/L (ref 9–15)
BUN SERPL-MCNC: 17 MG/DL (ref 8–23)
CALCIUM SERPL-MCNC: 9.2 MG/DL (ref 8.5–9.9)
CHLORIDE SERPL-SCNC: 104 MEQ/L (ref 95–107)
CO2 SERPL-SCNC: 21 MEQ/L (ref 20–31)
CREAT SERPL-MCNC: 1.03 MG/DL (ref 0.5–0.9)
GLUCOSE SERPL-MCNC: 113 MG/DL (ref 70–99)
POTASSIUM SERPL-SCNC: 4.3 MEQ/L (ref 3.4–4.9)
SODIUM SERPL-SCNC: 138 MEQ/L (ref 135–144)
THYROXINE (T4): 7.4 UG/DL (ref 4.5–11.7)
TSH SERPL-MCNC: 1.75 UIU/ML (ref 0.44–3.86)

## 2024-07-04 ASSESSMENT — ENCOUNTER SYMPTOMS
VOICE CHANGE: 0
NAUSEA: 0
SINUS PRESSURE: 0
PHOTOPHOBIA: 0
COLOR CHANGE: 0
TROUBLE SWALLOWING: 0
APNEA: 0
CHEST TIGHTNESS: 0
RECTAL PAIN: 0
SINUS PAIN: 0
SHORTNESS OF BREATH: 0
ABDOMINAL DISTENTION: 0
WHEEZING: 0
VOMITING: 0
CONSTIPATION: 0
DIARRHEA: 0
ABDOMINAL PAIN: 0
BLOOD IN STOOL: 0
SORE THROAT: 0
EYE REDNESS: 0
EYE ITCHING: 0
BACK PAIN: 0
EYE DISCHARGE: 0
RHINORRHEA: 0
COUGH: 0
FACIAL SWELLING: 0
EYE PAIN: 0

## 2024-07-04 NOTE — PROGRESS NOTES
2024    Jeannine Gonzalez (:  1955) is a 69 y.o. female, here for evaluation of the following medical concerns:    Hypertension  Pertinent negatives include no chest pain, headaches, neck pain, palpitations or shortness of breath.     67-year-old female with a history of essential hypertension, hypothyroidism and hyperlipidemia presents with a complaint of insomnia and obesity.         Obesity: The patient's body mass index is presently 32.84        Patient has an appointment using the to Jeannine is s/p PINKY.  She is experiencing numbness localized to the right knee.  She voices no complaints.        Hyperglycemia: Hemoglobin A1c was 5.8 on 2024.        Hypertension:compliant with Norvasc.  The patient's blood pressure is well-controlled at this time.        Menopausal symptoms: The patient symptoms are well controlled via estradiol which she takes        Hyperlipidemia: In regards to her hyperlipidemia the patient is compliant with pravastatin 40 mg orally daily      Hypothyroidism: In regards to her hypothyroidism the patient is compliant with Synthroid 150 mcg daily.  Thyroid studies were within normal limits when evaulated in 2024.          At present he denies polyuria,  Polydipsia, constitutional, sinus, visual, cardiopulmonary, urologic, gastrointestinal, immunologic/hematologic, musculoskeletal, neurologic,dermatologic, or psychiatric complaints.          Review of Systems   Constitutional:  Negative for chills, diaphoresis, fatigue and fever.   HENT:  Negative for congestion, dental problem, drooling, ear discharge, ear pain, facial swelling, hearing loss, mouth sores, nosebleeds, postnasal drip, rhinorrhea, sinus pressure, sinus pain, sneezing, sore throat, tinnitus, trouble swallowing and voice change.    Eyes:  Negative for photophobia, pain, discharge, redness, itching and visual disturbance.   Respiratory:  Negative for apnea, cough, chest tightness, shortness of breath and

## 2024-07-05 ENCOUNTER — APPOINTMENT (OUTPATIENT)
Dept: RADIOLOGY | Facility: HOSPITAL | Age: 69
End: 2024-07-05
Payer: MEDICARE

## 2024-07-05 ENCOUNTER — OFFICE VISIT (OUTPATIENT)
Dept: FAMILY MEDICINE CLINIC | Age: 69
End: 2024-07-05

## 2024-07-05 VITALS
WEIGHT: 218 LBS | SYSTOLIC BLOOD PRESSURE: 120 MMHG | HEIGHT: 68 IN | OXYGEN SATURATION: 98 % | BODY MASS INDEX: 33.04 KG/M2 | RESPIRATION RATE: 14 BRPM | HEART RATE: 77 BPM | DIASTOLIC BLOOD PRESSURE: 80 MMHG

## 2024-07-05 DIAGNOSIS — R10.11 RIGHT UPPER QUADRANT ABDOMINAL PAIN: ICD-10-CM

## 2024-07-05 DIAGNOSIS — E78.5 HYPERLIPIDEMIA, UNSPECIFIED HYPERLIPIDEMIA TYPE: ICD-10-CM

## 2024-07-05 DIAGNOSIS — R73.09 ELEVATED HEMOGLOBIN A1C: ICD-10-CM

## 2024-07-05 DIAGNOSIS — E03.9 HYPOTHYROIDISM, UNSPECIFIED TYPE: ICD-10-CM

## 2024-07-05 DIAGNOSIS — I10 ESSENTIAL HYPERTENSION: Primary | ICD-10-CM

## 2024-07-05 RX ORDER — MELOXICAM 15 MG/1
15 TABLET ORAL DAILY
COMMUNITY
Start: 2024-07-01 | End: 2024-09-29

## 2024-07-10 ENCOUNTER — APPOINTMENT (OUTPATIENT)
Dept: ORTHOPEDIC SURGERY | Facility: CLINIC | Age: 69
End: 2024-07-10
Payer: MEDICARE

## 2024-07-15 ENCOUNTER — TREATMENT (OUTPATIENT)
Dept: PHYSICAL THERAPY | Facility: HOSPITAL | Age: 69
End: 2024-07-15
Payer: MEDICARE

## 2024-07-15 DIAGNOSIS — M25.812 IMPINGEMENT OF LEFT SHOULDER: Primary | ICD-10-CM

## 2024-07-15 PROCEDURE — 97110 THERAPEUTIC EXERCISES: CPT | Mod: GP

## 2024-07-15 PROCEDURE — 97140 MANUAL THERAPY 1/> REGIONS: CPT | Mod: GP

## 2024-07-15 ASSESSMENT — PAIN DESCRIPTION - DESCRIPTORS: DESCRIPTORS: ACHING

## 2024-07-15 ASSESSMENT — PAIN - FUNCTIONAL ASSESSMENT: PAIN_FUNCTIONAL_ASSESSMENT: 0-10

## 2024-07-15 ASSESSMENT — PAIN SCALES - GENERAL: PAINLEVEL_OUTOF10: 2

## 2024-07-15 NOTE — PROGRESS NOTES
Physical Therapy    Physical Therapy Treatment    Patient Name: Sophia Granados  MRN: [id number]    Today's Date: 7/15/2024  Time Calculation  Start Time: 0744  Stop Time: 0825  Time Calculation (min): 41 min     PT Therapeutic Procedures Time Entry  Manual Therapy Time Entry: 10  Therapeutic Exercise Time Entry: 31                 Insurance:  20% COINS/240 DED WITH UNLIMITED   VISITS/BMN/KX MOD AFTER 20 VISITS/PA NOT RQRD/ MED MUTUAL WILL COVER PART B COIN   AFTER DED REMAINING 116.99 IS MET EPRYOR 05/08/24      Visit: 8     Assessment:     Plan:   Continue to progress strength and endurance of L shoulder for functional use of L shoulder     Current Problem  1. Impingement of left shoulder            General   Patient notes that reaching backwards causes pain. She has been taking meloxicam which has been helping from a pain standpoint.        Subjective    Precautions  Precautions  Precautions Comment: no heavy overhead lifting       Pain  Pain Assessment  Pain Assessment: 0-10  0-10 (Numeric) Pain Score: 2  Pain Type: Acute pain  Pain Location: Shoulder  Pain Orientation: Left  Pain Descriptors: Aching    Objective     Treatments:  Manual:      Cupping to L UT this date. Held on dry needling as patient did not eat breakfast.    Therapeutic exercise tape applied to L shoulder. Patient denies adverse reactions to application of tape. Educated patient that tape will last 3-5 days, ok to bathe and pat dry. Remove immediately if adverse reaction occurs such as blister, redness, itchiness. Patient reports understanding.     AAROM shoulder flexion/CP/ER: x10-20 each      Side lying shoulder ER: ---  Side lying shoulder abduction: +painful  Side lying shoulder horizontal abduction: ---  Side lying shoulder flexion: +painful  Supine AROM shoulder flexion: ---  Serratus punch: 2# ---  Supine ABC: 2# ---     Wall slide F/scaption:2x10 each   Wall ABC: 1x  Ball on wall: 10x2 each   Row/LAE: Green TB 2x10  IR/ER: Green TB  2x10  Tricep: Green TB 2x10  Bicep 3 way: 2# 2x10  Jobes: painful  Bent shoulder T: ---  Pec stretch: held painful

## 2024-07-18 ENCOUNTER — APPOINTMENT (OUTPATIENT)
Dept: PHYSICAL THERAPY | Facility: HOSPITAL | Age: 69
End: 2024-07-18
Payer: MEDICARE

## 2024-07-23 ENCOUNTER — TREATMENT (OUTPATIENT)
Dept: PHYSICAL THERAPY | Facility: HOSPITAL | Age: 69
End: 2024-07-23
Payer: MEDICARE

## 2024-07-23 DIAGNOSIS — M25.812 IMPINGEMENT OF LEFT SHOULDER: Primary | ICD-10-CM

## 2024-07-23 PROCEDURE — 97140 MANUAL THERAPY 1/> REGIONS: CPT | Mod: GP

## 2024-07-23 PROCEDURE — 97110 THERAPEUTIC EXERCISES: CPT | Mod: GP

## 2024-07-23 ASSESSMENT — PAIN SCALES - GENERAL: PAINLEVEL_OUTOF10: 3

## 2024-07-23 ASSESSMENT — PAIN DESCRIPTION - DESCRIPTORS: DESCRIPTORS: ACHING

## 2024-07-23 ASSESSMENT — PAIN - FUNCTIONAL ASSESSMENT: PAIN_FUNCTIONAL_ASSESSMENT: 0-10

## 2024-07-23 NOTE — PROGRESS NOTES
Physical Therapy    Physical Therapy Treatment    Patient Name: Sophia Granados  MRN: [id number]  Today's Date: 7/23/2024    Time Entry:   Time Calculation  Start Time: 0746  Stop Time: 0824  Time Calculation (min): 38 min     PT Therapeutic Procedures Time Entry  Manual Therapy Time Entry: 15  Therapeutic Exercise Time Entry: 23                 Insurance:  20% COINS/240 DED WITH UNLIMITED   VISITS/BMN/KX MOD AFTER 20 VISITS/PA NOT RQRD/ MED MUTUAL WILL COVER PART B COIN   AFTER DED REMAINING 116.99 IS MET EPRYOR 05/08/24      Visit: 9    Assessment:   Increased reps with all theraband exercises. Increased pain with ER, however pain able to complete all reps this date. Introduced UBE with most difficulty with reverse. Able to increase size of letters with ABCs. Some muscular fatigue observed with HEP. Lingering pain upon completion of treatment   Plan:   Strengthen L UE    Current Problem  1. Impingement of left shoulder            General   Patient notes that she is not sure what she did over the weekend, but she was aching in her bicep on Sunday and into today.        Subjective    Precautions  Precautions  Precautions Comment: no heavy overhead lifting       Pain  Pain Assessment  Pain Assessment: 0-10  0-10 (Numeric) Pain Score: 3  Pain Type: Acute pain  Pain Location: Shoulder  Pain Orientation: Left  Pain Descriptors: Aching    Objective     Treatments:  Cupping to L UT  and L bicep muscle belly this date     Therapeutic exercise tape applied to L shoulder. Patient denies adverse reactions to application of tape. Educated patient that tape will last 3-5 days, ok to bathe and pat dry. Remove immediately if adverse reaction occurs such as blister, redness, itchiness. Patient reports understanding.     AAROM shoulder flexion/CP/ER: ---     Side lying shoulder ER: ---  Side lying shoulder abduction: +painful  Side lying shoulder horizontal abduction: ---  Side lying shoulder flexion: +painful  Supine AROM shoulder  flexion: ---  Serratus punch: 2# ---  Supine ABC: 2# ---     UBE: 2' forward, 1' retro   Wall slide F/scaption ---  Wall ABC: 1x  Ball on wall: 10x2 each   Row/LAE: Green TB 3x10  IR/ER: Green TB 3x10  Tricep: Green TB 3x10  Bicep 3 way: 2# ---  Jobes: painful  Bent shoulder T: ---  Pec stretch: held painful

## 2024-07-25 ENCOUNTER — APPOINTMENT (OUTPATIENT)
Dept: PHYSICAL THERAPY | Facility: HOSPITAL | Age: 69
End: 2024-07-25
Payer: MEDICARE

## 2024-07-30 ENCOUNTER — TREATMENT (OUTPATIENT)
Dept: PHYSICAL THERAPY | Facility: HOSPITAL | Age: 69
End: 2024-07-30
Payer: MEDICARE

## 2024-07-30 DIAGNOSIS — M25.812 IMPINGEMENT OF LEFT SHOULDER: Primary | ICD-10-CM

## 2024-07-30 PROCEDURE — 97110 THERAPEUTIC EXERCISES: CPT | Mod: GP

## 2024-07-30 ASSESSMENT — PAIN - FUNCTIONAL ASSESSMENT: PAIN_FUNCTIONAL_ASSESSMENT: 0-10

## 2024-07-30 ASSESSMENT — PAIN DESCRIPTION - DESCRIPTORS: DESCRIPTORS: ACHING

## 2024-07-30 ASSESSMENT — PAIN SCALES - GENERAL: PAINLEVEL_OUTOF10: 4

## 2024-07-30 NOTE — PROGRESS NOTES
Physical Therapy    Physical Therapy Treatment    Patient Name: Sophia Granados  MRN: [id number]  Today's Date: 7/30/2024    Time Entry:   Time Calculation  Start Time: 0747  Stop Time: 0821  Time Calculation (min): 34 min     PT Therapeutic Procedures Time Entry  Manual Therapy Time Entry: 8  Therapeutic Exercise Time Entry: 26                   Assessment:   Reps decreased with RTC strengthening exercises d/t pain likely from lifting desk over the weekend. Introduced SA wall slides this date with difficulty complete clocks d/t increased pain to L shoulder. L UT with increased tone this date. Dry needling performed with much muscle twitching. Educated on likelihood of being sore and to utilize heat upon returning home today.   Plan:   Increase resistance with theraband exercises    Current Problem  1. Impingement of left shoulder            General   Patient states that she helped lift a desk into a truck and had increased pain        Subjective    Precautions  Precautions  Precautions Comment: no heavy overhead lifting       Pain  Pain Assessment  Pain Assessment: 0-10  0-10 (Numeric) Pain Score: 4  Pain Type: Acute pain  Pain Location: Shoulder  Pain Orientation: Left  Pain Descriptors: Aching    Objective     Treatments:  Dry needling performed this date to area L UT x3 needles .  Informed consent obtained from patient and in medical chart. Area assessed for cleanliness.    30mm  needles inserted,   pistioning     technique performed.  all needles removed, area inspected for adverse symptoms, none noted, patient educated in post- dry needling management and expected symptoms from treatment. all patient questions answered.       AAROM shoulder flexion/CP/ER: ---     Side lying shoulder ER: ---  Side lying shoulder abduction: +painful  Side lying shoulder horizontal abduction: ---  Side lying shoulder flexion: +painful  Supine AROM shoulder flexion: ---  Serratus punch: 2# ---  Supine ABC: 2# ---     UBE: 2'  forward, 2' retro   Wall ABC: 1x (increased pain with circles)  Ball on wall: 10x2 each   Row/LAE: RTB 3x10   IR/ER: GTB x20/x20  Tricep: Green TB 3x10  SA wall slide: YTB x20  SA clock: painful    Bicep 3 way: 2# ---  Jobes: painful  Bent shoulder T/row/LAE: 1# 2x10 each   Bent tricep: 2# x3  Pec stretch: held painful

## 2024-08-01 ENCOUNTER — APPOINTMENT (OUTPATIENT)
Dept: PHYSICAL THERAPY | Facility: HOSPITAL | Age: 69
End: 2024-08-01
Payer: MEDICARE

## 2024-08-01 DIAGNOSIS — N20.0 KIDNEY STONES: Primary | ICD-10-CM

## 2024-08-02 ENCOUNTER — OFFICE VISIT (OUTPATIENT)
Dept: UROLOGY | Age: 69
End: 2024-08-02
Payer: MEDICARE

## 2024-08-02 ENCOUNTER — HOSPITAL ENCOUNTER (OUTPATIENT)
Dept: GENERAL RADIOLOGY | Age: 69
End: 2024-08-02
Attending: UROLOGY
Payer: MEDICARE

## 2024-08-02 VITALS
BODY MASS INDEX: 32.74 KG/M2 | DIASTOLIC BLOOD PRESSURE: 78 MMHG | HEART RATE: 84 BPM | WEIGHT: 216 LBS | SYSTOLIC BLOOD PRESSURE: 128 MMHG | HEIGHT: 68 IN | OXYGEN SATURATION: 97 %

## 2024-08-02 DIAGNOSIS — N20.0 KIDNEY STONES: Primary | ICD-10-CM

## 2024-08-02 DIAGNOSIS — N20.0 KIDNEY STONES: ICD-10-CM

## 2024-08-02 PROCEDURE — 1090F PRES/ABSN URINE INCON ASSESS: CPT | Performed by: UROLOGY

## 2024-08-02 PROCEDURE — G8417 CALC BMI ABV UP PARAM F/U: HCPCS | Performed by: UROLOGY

## 2024-08-02 PROCEDURE — 3017F COLORECTAL CA SCREEN DOC REV: CPT | Performed by: UROLOGY

## 2024-08-02 PROCEDURE — 3078F DIAST BP <80 MM HG: CPT | Performed by: UROLOGY

## 2024-08-02 PROCEDURE — 1123F ACP DISCUSS/DSCN MKR DOCD: CPT | Performed by: UROLOGY

## 2024-08-02 PROCEDURE — 3074F SYST BP LT 130 MM HG: CPT | Performed by: UROLOGY

## 2024-08-02 PROCEDURE — G8399 PT W/DXA RESULTS DOCUMENT: HCPCS | Performed by: UROLOGY

## 2024-08-02 PROCEDURE — 1036F TOBACCO NON-USER: CPT | Performed by: UROLOGY

## 2024-08-02 PROCEDURE — 74018 RADEX ABDOMEN 1 VIEW: CPT

## 2024-08-02 PROCEDURE — G8427 DOCREV CUR MEDS BY ELIG CLIN: HCPCS | Performed by: UROLOGY

## 2024-08-02 PROCEDURE — 99213 OFFICE O/P EST LOW 20 MIN: CPT | Performed by: UROLOGY

## 2024-08-02 ASSESSMENT — ENCOUNTER SYMPTOMS: ABDOMINAL PAIN: 0

## 2024-08-02 NOTE — PROGRESS NOTES
Subjective:      Patient ID: Jeannine Gonzalez is a 69 y.o. female    HPI   This is a 70 yo female with h/o HTN, hypothyroidism and elevated cholesterol and with Lt 1 cm renal stone s/p Lt ESWL with stent on 12/23/19 and then stent removal. She had a  Lt PCNL for large stone (CaMo/Di) by Dr Lopez in 2023. She had some post-op hematuria that required evaluation in the ED but resolved. She is doing well currently. She was instructed on dietary stone prevention at the [facility]. She had a Renal U/S in 7/23 by Dr Lopez that showed no hydro or stones. KUB today shows no suspicious calcifications. Since last seen on 7/28/23, she has no colic or hematuria or dysuria. She has a Lt shoulder partial tendon tear and is in rehab.     he has no pain or hematuria. I reviewed the interval medical records from [facility].     Past Medical History:   Diagnosis Date    Arthritis     both knees    Hyperlipidemia     meds > 10 yrs    Hypertension     meds > 4 yrs    Hyperthyroidism     Hypothyroidism     meds > 20 yrs    Palpitations 07/30/2021    Thyroid goiter      Past Surgical History:   Procedure Laterality Date    APPENDECTOMY  1981    with left oophorectomy    BREAST BIOPSY Left 2012    x 2 / both benign    CARPAL TUNNEL RELEASE Right 07/13/2021    CARPAL TUNNEL RELEASE/REPAIR RIGHT performed by Florence Olivares MD at [facility] OR    CARPAL TUNNEL RELEASE Right 07/2021    CARPAL TUNNEL RELEASE Left 08/10/2021    CARPAL TUNNEL RELEASE/ REPAIR LEFT, PAT AT [facility] performed by Florence Olivares MD at [facility] OR    CHOLECYSTECTOMY  2008    COLONOSCOPY  11/16/2007    COLONOSCOPY  01/07/2013    erum Carreon (polyps)    COLONOSCOPY  02/12/2016    w/polypectomy     COLONOSCOPY N/A 02/18/2019    COLORECTAL CANCER SCREENING, HIGH RISK performed by Ciro Trejo MD at [facility]    COLONOSCOPY N/A 02/21/2022    COLORECTAL CANCER SCREENING, HIGH RISK performed by Harvey Driver MD at [facility]    CYSTOSCOPY Left 
DISPLAY PLAN FREE TEXT

## 2024-08-05 ENCOUNTER — TREATMENT (OUTPATIENT)
Dept: PHYSICAL THERAPY | Facility: HOSPITAL | Age: 69
End: 2024-08-05
Payer: MEDICARE

## 2024-08-05 DIAGNOSIS — M25.812 IMPINGEMENT OF LEFT SHOULDER: Primary | ICD-10-CM

## 2024-08-05 PROCEDURE — 97110 THERAPEUTIC EXERCISES: CPT | Mod: GP

## 2024-08-05 ASSESSMENT — PAIN SCALES - GENERAL: PAINLEVEL_OUTOF10: 0 - NO PAIN

## 2024-08-05 ASSESSMENT — PAIN - FUNCTIONAL ASSESSMENT: PAIN_FUNCTIONAL_ASSESSMENT: 0-10

## 2024-08-05 NOTE — PROGRESS NOTES
Physical Therapy    Physical Therapy Treatment    Patient Name: Sophia Granados  MRN: [id number]  Today's Date: 8/5/2024    Time Entry:   Time Calculation  Start Time: 0658  Stop Time: 0740  Time Calculation (min): 42 min     PT Therapeutic Procedures Time Entry  Manual Therapy Time Entry: 2  Therapeutic Exercise Time Entry: 40                 Insurance:  20% COINS/240 DED WITH UNLIMITED   VISITS/BMN/KX MOD AFTER 20 VISITS/PA NOT RQRD/ MED MUTUAL WILL COVER PART B COIN   AFTER DED REMAINING 116.99 IS MET EPRYOR 05/08/24      Visit: 11    Assessment:   Able to complete circular movements this date pain free whereas these movements were previously painful. Attempted standing horizontal abduction with resistance that was painful. Introduced Y and I for scapular strengthening. Patient is doing well with regards to pain, ROM and ability to perform ADLs. Anticipate ability to transition to [facility] next visit.   Plan:   Reassessment to be completed next visit     Current Problem  1. Impingement of left shoulder            General   Patient arrives with no pain. States that she had pain following her last visit that last x1 day and felt like it was muscular strain       Subjective    Precautions  Precautions  Precautions Comment: no heavy overhead lifting       Pain  Pain Assessment  Pain Assessment: 0-10  0-10 (Numeric) Pain Score: 0 - No pain    Objective     Treatments:  UBE: 2' forward, 2' retro   Wall ABC: 1x (big letters)   Ball on wall: 10x2 each    Row/LAE: RTB 3x10   IR/ER: RTB x30  Tricep: GTB 3x10  SA wall slide: YTB x20  B ER: RTB x20  Bent shoulder T,Y,I: 2x10    Therapeutic exercise tape to L shoulder  Patient denies adverse reactions to application of tape. Educated patient that tape will last 3-5 days, ok to bathe and pat dry. Remove immediately if adverse reaction occurs such as blister, redness, itchiness. Patient reports understanding.

## 2024-08-12 ENCOUNTER — TREATMENT (OUTPATIENT)
Dept: PHYSICAL THERAPY | Facility: HOSPITAL | Age: 69
End: 2024-08-12
Payer: MEDICARE

## 2024-08-12 DIAGNOSIS — M25.812 IMPINGEMENT OF LEFT SHOULDER: Primary | ICD-10-CM

## 2024-08-12 PROCEDURE — 97110 THERAPEUTIC EXERCISES: CPT | Mod: GP

## 2024-08-12 ASSESSMENT — PAIN - FUNCTIONAL ASSESSMENT: PAIN_FUNCTIONAL_ASSESSMENT: 0-10

## 2024-08-12 ASSESSMENT — PAIN SCALES - GENERAL: PAINLEVEL_OUTOF10: 0 - NO PAIN

## 2024-08-12 NOTE — PROGRESS NOTES
Physical Therapy    Physical Therapy Treatment    Patient Name: Sophia Granados  MRN: [id number]  Today's Date: 8/12/2024    Time Entry:   Time Calculation  Start Time: 0700  Stop Time: 0731  Time Calculation (min): 31 min     PT Therapeutic Procedures Time Entry  Therapeutic Exercise Time Entry: 31                 Insurance:  20% COINS/240 DED WITH UNLIMITED   VISITS/BMN/KX MOD AFTER 20 VISITS/PA NOT RQRD/ MED MUTUAL WILL COVER PART B COIN   AFTER DED REMAINING 116.99 IS MET EPRYOR 05/08/24      Visit: 12    Assessment:   Patient demonstrates improved strength and ROM for flexion and external rotation. Overall pain has improvement. She is planning on getting an injection in October if pain still persists as she is going on vacation at that time. HEP reviewed this date with updated handout provided. Encouraged to do 3-4x/week. Limits activities that cause pain with understanding. Patient to be placed on 30 day hold to perform HEP.   Plan:   30 day hold for HEP     Current Problem  1. Impingement of left shoulder            General   Patient reports 90% overall improvement since injury.        Subjective    Precautions  Precautions  Precautions Comment: no heavy overhead lifting       Pain  Pain Assessment  Pain Assessment: 0-10  0-10 (Numeric) Pain Score: 0 - No pain    Objective   AROM   Left shoulder flexion: 145  P+  Left shoulder abduction: 90  P+     Left shoulder ER: 76  P+  Left shoulder IR: T8  P+        MMT  Deltoid flexion left: 4/5    Deltoid abduction left: 4/5    ER @ 0 degrees abduction left: 4/5    IR @ 0 degrees abduction left: 4/5      Outcome Measures:  Other Measures  Disability of Arm Shoulder Hand (DASH): 20 (20.45- Quick DASH)    Treatments:  UBE: 2' forward, 2' retro   Ball on wall: 10x2 each    Row/LAE: GTB 2x10   IR/ER: RTB x20  Tricep: GTB 3x10  SA wall slide: YTB x20  Bent shoulder T,Y,I: 2x10       OP EDUCATION:       Goals:  Patient will be independent with HEP (MET)  Patient will  demonstrate L shoulder strength >/=4/5 to improve functional use (MET)  Patient will demonstrate L shoulder ROM WFL to be able to reach into overhead cabinets (PROGRESSING)   Patient will demonstrate >/=20% improvement in Quick DASH demonstrating improved functional use (PROGRESSING)   Patient will report L shoulder pain </=2/10 with activity  (PROGRESSING)

## 2024-08-16 ENCOUNTER — OFFICE VISIT (OUTPATIENT)
Dept: OBGYN CLINIC | Age: 69
End: 2024-08-16

## 2024-08-16 VITALS
SYSTOLIC BLOOD PRESSURE: 130 MMHG | BODY MASS INDEX: 32.74 KG/M2 | HEART RATE: 95 BPM | WEIGHT: 216 LBS | DIASTOLIC BLOOD PRESSURE: 82 MMHG | HEIGHT: 68 IN

## 2024-08-16 DIAGNOSIS — Z12.31 SCREENING MAMMOGRAM FOR BREAST CANCER: ICD-10-CM

## 2024-08-16 DIAGNOSIS — Z01.419 WOMEN'S ANNUAL ROUTINE GYNECOLOGICAL EXAMINATION: Primary | ICD-10-CM

## 2024-08-16 RX ORDER — ESTRADIOL 0.05 MG/D
1 PATCH TRANSDERMAL WEEKLY
Qty: 24 PATCH | Refills: 1 | Status: SHIPPED | OUTPATIENT
Start: 2024-08-16

## 2024-08-16 RX ORDER — CLOTRIMAZOLE AND BETAMETHASONE DIPROPIONATE 10; .64 MG/G; MG/G
CREAM TOPICAL
Qty: 45 G | Refills: 2 | Status: SHIPPED | OUTPATIENT
Start: 2024-08-16

## 2024-08-16 ASSESSMENT — ENCOUNTER SYMPTOMS
BLOOD IN STOOL: 0
SORE THROAT: 0
NAUSEA: 0
CHEST TIGHTNESS: 0
ABDOMINAL PAIN: 0
WHEEZING: 0
ABDOMINAL DISTENTION: 0
CONSTIPATION: 0
SHORTNESS OF BREATH: 0
COLOR CHANGE: 0
VOICE CHANGE: 0
TROUBLE SWALLOWING: 0
COUGH: 0
VOMITING: 0
BACK PAIN: 0

## 2024-08-16 NOTE — PROGRESS NOTES
clotrimazole-betamethasone (LOTRISONE) 1-0.05 % cream     Sig: Apply to affected area twice daily as needed     Dispense:  45 g     Refill:  2    estradiol (CLIMARA) 0.05 MG/24HR     Sig: Place 1 patch onto the skin once a week     Dispense:  24 patch     Refill:  1       Repeat Annual every 1 year. New ASCCP guidelines regarding pap and Hi Risk HPV co-testing reviewed.  Cervical Cytology Evaluation begins at 21 years old.  If Negative Cytology, Follow-upscreening per current  ASCCP guidelines.   Mammograms every 1 year. If 39 yo and last mammogram was negative.  Calcium and Vitamin D dosing reviewed.  Colonoscopy screening guidelines reviewed as well as onset forbone density testing.  Birth control and barrier methods and recommendations discussed.  STD counseling and prevention reviewed.  Gardisil counseling completed for all patients 9-27 yo.  Routine health maintenance per patients PCP.       Electronically signed by Alexis Figueroa DO on 8/16/24

## 2024-08-26 ENCOUNTER — HOSPITAL ENCOUNTER (OUTPATIENT)
Dept: WOMENS IMAGING | Age: 69
Discharge: HOME | End: 2024-08-28
Attending: OBSTETRICS & GYNECOLOGY
Payer: MEDICARE

## 2024-08-26 DIAGNOSIS — Z12.31 SCREENING MAMMOGRAM FOR BREAST CANCER: ICD-10-CM

## 2024-08-26 PROCEDURE — 77063 BREAST TOMOSYNTHESIS BI: CPT

## 2024-08-27 ENCOUNTER — TELEMEDICINE (OUTPATIENT)
Dept: FAMILY MEDICINE CLINIC | Age: 69
End: 2024-08-27

## 2024-08-27 DIAGNOSIS — Z00.00 MEDICARE ANNUAL WELLNESS VISIT, SUBSEQUENT: Primary | ICD-10-CM

## 2024-08-27 ASSESSMENT — LIFESTYLE VARIABLES
HOW OFTEN DO YOU HAVE A DRINK CONTAINING ALCOHOL: 2-3 TIMES A WEEK
HOW MANY STANDARD DRINKS CONTAINING ALCOHOL DO YOU HAVE ON A TYPICAL DAY: 1 OR 2

## 2024-08-27 ASSESSMENT — PATIENT HEALTH QUESTIONNAIRE - PHQ9
SUM OF ALL RESPONSES TO PHQ QUESTIONS 1-9: 0
1. LITTLE INTEREST OR PLEASURE IN DOING THINGS: NOT AT ALL
SUM OF ALL RESPONSES TO PHQ9 QUESTIONS 1 & 2: 0
2. FEELING DOWN, DEPRESSED OR HOPELESS: NOT AT ALL
SUM OF ALL RESPONSES TO PHQ QUESTIONS 1-9: 0

## 2024-08-27 NOTE — PATIENT INSTRUCTIONS
Starting a Weight Loss Plan: Care Instructions  Overview     It can be a challenge to lose weight. But your doctor can help you make a weight-loss plan that meets your needs.  You don't have to make a lot of big changes at once. A better idea might be to focus on small changes and stick with them. When those changes become habit, you can add a few more changes.  Some people find it helpful to take an exercise or nutrition class. If you have questions, ask your doctor about seeing a registered dietitian or an exercise specialist. You might also think about joining a weight-loss support group.  If you're not ready to make changes right now, try to pick a date in the future. Then make an appointment with your doctor to talk about when and how you'll get started with a plan.  Follow-up care is a key part of your treatment and safety. Be sure to make and go to all appointments, and call your doctor if you are having problems. It's also a good idea to know your test results and keep a list of the medicines you take.  How can you care for yourself at home?  Set realistic goals. Many people expect to lose much more weight than is likely. A weight loss of 5% to 10% of your body weight may be enough to improve your health.  Get family and friends involved to provide support. Talk to them about why you are trying to lose weight, and ask them to help. They can help by participating in exercise and having meals with you, even if they may be eating something different.  Find what works best for you. If you do not have time or do not like to cook, a program that offers meal replacement bars or shakes may be better for you. Or if you like to prepare meals, finding a plan that includes daily menus and recipes may be best.  Ask your doctor about other health professionals who can help you achieve your weight loss goals.  A dietitian can help you make healthy changes in your diet.  An exercise specialist or  can  Preventive Plan for Jeannine Gonzalez - 8/27/2024  Medicare offers a range of preventive health benefits. Some of the tests and screenings are paid in full while other may be subject to a deductible, co-insurance, and/or copay.    Some of these benefits include a comprehensive review of your medical history including lifestyle, illnesses that may run in your family, and various assessments and screenings as appropriate.    After reviewing your medical record and screening and assessments performed today your provider may have ordered immunizations, labs, imaging, and/or referrals for you.  A list of these orders (if applicable) as well as your Preventive Care list are included within your After Visit Summary for your review.    Other Preventive Recommendations:    A preventive eye exam performed by an eye specialist is recommended every 1-2 years to screen for glaucoma; cataracts, macular degeneration, and other eye disorders.  A preventive dental visit is recommended every 6 months.  Try to get at least 150 minutes of exercise per week or 10,000 steps per day on a pedometer .  Order or download the FREE \"Exercise & Physical Activity: Your Everyday Guide\" from [employer]. Call 1-171.427.5587 or search [employer] online.  You need 1462-5228 mg of calcium and 8092-7247 IU of vitamin D per day. It is possible to meet your calcium requirement with diet alone, but a vitamin D supplement is usually necessary to meet this goal.  When exposed to the sun, use a sunscreen that protects against both UVA and UVB radiation with an SPF of 30 or greater. Reapply every 2 to 3 hours or after sweating, drying off with a towel, or swimming.  Always wear a seat belt when traveling in a car. Always wear a helmet when riding a bicycle or motorcycle.

## 2024-08-27 NOTE — PROGRESS NOTES
Medicare Annual Wellness Visit    Jeannine Gonzalez is here for Medicare AWV    Assessment & Plan   Medicare annual wellness visit, subsequent    Recommendations for Preventive Services Due: see orders and patient instructions/AVS.  Recommended screening schedule for the next 5-10 years is provided to the patient in written form: see Patient Instructions/AVS.     Return in 1 year (on 8/27/2025) for Medicare AWV.     Subjective       Patient's complete Health Risk Assessment and screening values have been reviewed and are found in Flowsheets. The following problems were reviewed today and where indicated follow up appointments were made and/or referrals ordered.    Positive Risk Factor Screenings with Interventions:                  Abnormal BMI (obese):  There is no height or weight on file to calculate BMI. (!) Abnormal  Interventions:  Patient declines any further evaluation or treatment           Safety:  Do you have working smoke detectors?: (!) No  Interventions:  Patient declined any further interventions or treatment                   Objective    Patient-Reported Vitals  No data recorded               Allergies   Allergen Reactions    Antiseptic Products, Misc. Rash     duraprep     Prior to Visit Medications    Medication Sig Taking? Authorizing Provider   clotrimazole-betamethasone (LOTRISONE) 1-0.05 % cream Apply to affected area twice daily as needed  Alexis Figueroa,    estradiol (CLIMARA) 0.05 MG/24HR Place 1 patch onto the skin once a week  Alexis Figueroa, DO   meloxicam (MOBIC) 15 MG tablet Take 1 tablet by mouth daily  Provider, MD Filiberto   amLODIPine (NORVASC) 5 MG tablet TAKE 1 TABLET BY MOUTH EVERY DAY  Favian Hyman MD   levothyroxine (SYNTHROID) 150 MCG tablet TAKE 1 TABLET BY MOUTH EVERY DAY  Favian Hyman MD   pravastatin (PRAVACHOL) 40 MG tablet TAKE 1 TABLET BY MOUTH EVERY DAY  Favian Hyman MD   levothyroxine (SYNTHROID) 175 MCG tablet TAKE 1 TABLET BY MOUTH EVERY DAY

## 2024-09-30 ENCOUNTER — NURSE ONLY (OUTPATIENT)
Dept: FAMILY MEDICINE CLINIC | Age: 69
End: 2024-09-30
Payer: MEDICARE

## 2024-09-30 DIAGNOSIS — Z23 NEED FOR INFLUENZA VACCINATION: Primary | ICD-10-CM

## 2024-09-30 PROCEDURE — G0008 ADMIN INFLUENZA VIRUS VAC: HCPCS | Performed by: INTERNAL MEDICINE

## 2024-09-30 PROCEDURE — 90653 IIV ADJUVANT VACCINE IM: CPT | Performed by: INTERNAL MEDICINE

## 2024-10-07 DIAGNOSIS — N20.0 KIDNEY STONES: Primary | ICD-10-CM

## 2024-10-09 ENCOUNTER — APPOINTMENT (OUTPATIENT)
Dept: ORTHOPEDIC SURGERY | Facility: CLINIC | Age: 69
End: 2024-10-09
Payer: MEDICARE

## 2024-11-21 RX ORDER — AMLODIPINE BESYLATE 5 MG/1
TABLET ORAL
Qty: 90 TABLET | Refills: 1 | Status: SHIPPED | OUTPATIENT
Start: 2024-11-21

## 2024-11-21 RX ORDER — PRAVASTATIN SODIUM 40 MG
TABLET ORAL
Qty: 90 TABLET | Refills: 3 | Status: SHIPPED | OUTPATIENT
Start: 2024-11-21

## 2025-01-02 DIAGNOSIS — R73.09 ELEVATED HEMOGLOBIN A1C: ICD-10-CM

## 2025-01-02 DIAGNOSIS — E03.9 HYPOTHYROIDISM, UNSPECIFIED TYPE: ICD-10-CM

## 2025-01-02 DIAGNOSIS — I10 ESSENTIAL HYPERTENSION: ICD-10-CM

## 2025-01-02 LAB
ALBUMIN SERPL-MCNC: 4.1 G/DL (ref 3.5–4.6)
ALP SERPL-CCNC: 69 U/L (ref 40–130)
ALT SERPL-CCNC: 30 U/L (ref 0–33)
ANION GAP SERPL CALCULATED.3IONS-SCNC: 9 MEQ/L (ref 9–15)
AST SERPL-CCNC: 23 U/L (ref 0–35)
BASOPHILS # BLD: 0.1 K/UL (ref 0–0.2)
BASOPHILS NFR BLD: 0.8 %
BILIRUB SERPL-MCNC: 0.3 MG/DL (ref 0.2–0.7)
BUN SERPL-MCNC: 14 MG/DL (ref 8–23)
CALCIUM SERPL-MCNC: 9.2 MG/DL (ref 8.5–9.9)
CHLORIDE SERPL-SCNC: 104 MEQ/L (ref 95–107)
CO2 SERPL-SCNC: 23 MEQ/L (ref 20–31)
CREAT SERPL-MCNC: 1.02 MG/DL (ref 0.5–0.9)
EOSINOPHIL # BLD: 0.2 K/UL (ref 0–0.7)
EOSINOPHIL NFR BLD: 2.6 %
ERYTHROCYTE [DISTWIDTH] IN BLOOD BY AUTOMATED COUNT: 13.2 % (ref 11.5–14.5)
GLOBULIN SER CALC-MCNC: 2.9 G/DL (ref 2.3–3.5)
GLUCOSE SERPL-MCNC: 103 MG/DL (ref 70–99)
HCT VFR BLD AUTO: 45.8 % (ref 37–47)
HGB BLD-MCNC: 15.4 G/DL (ref 12–16)
LYMPHOCYTES # BLD: 2.1 K/UL (ref 1–4.8)
LYMPHOCYTES NFR BLD: 26.9 %
MCH RBC QN AUTO: 30.1 PG (ref 27–31.3)
MCHC RBC AUTO-ENTMCNC: 33.6 % (ref 33–37)
MCV RBC AUTO: 89.5 FL (ref 79.4–94.8)
MONOCYTES # BLD: 0.6 K/UL (ref 0.2–0.8)
MONOCYTES NFR BLD: 7.3 %
NEUTROPHILS # BLD: 4.9 K/UL (ref 1.4–6.5)
NEUTS SEG NFR BLD: 62.3 %
PLATELET # BLD AUTO: 288 K/UL (ref 130–400)
POTASSIUM SERPL-SCNC: 4.2 MEQ/L (ref 3.4–4.9)
PROT SERPL-MCNC: 7 G/DL (ref 6.3–8)
RBC # BLD AUTO: 5.12 M/UL (ref 4.2–5.4)
SODIUM SERPL-SCNC: 136 MEQ/L (ref 135–144)
TSH SERPL-MCNC: 0.13 UIU/ML (ref 0.44–3.86)
WBC # BLD AUTO: 7.8 K/UL (ref 4.8–10.8)

## 2025-01-03 LAB
ESTIMATED AVERAGE GLUCOSE: 108 MG/DL
HBA1C MFR BLD: 5.4 % (ref 4–6)
THYROXINE (T4): 8.8 UG/DL (ref 4.5–11.7)

## 2025-01-03 ASSESSMENT — PATIENT HEALTH QUESTIONNAIRE - PHQ9
SUM OF ALL RESPONSES TO PHQ QUESTIONS 1-9: 0
SUM OF ALL RESPONSES TO PHQ9 QUESTIONS 1 & 2: 0
SUM OF ALL RESPONSES TO PHQ QUESTIONS 1-9: 0
SUM OF ALL RESPONSES TO PHQ QUESTIONS 1-9: 0
SUM OF ALL RESPONSES TO PHQ9 QUESTIONS 1 & 2: 0
2. FEELING DOWN, DEPRESSED OR HOPELESS: NOT AT ALL
2. FEELING DOWN, DEPRESSED OR HOPELESS: NOT AT ALL
SUM OF ALL RESPONSES TO PHQ QUESTIONS 1-9: 0
1. LITTLE INTEREST OR PLEASURE IN DOING THINGS: NOT AT ALL
1. LITTLE INTEREST OR PLEASURE IN DOING THINGS: NOT AT ALL

## 2025-01-06 ENCOUNTER — OFFICE VISIT (OUTPATIENT)
Age: 70
End: 2025-01-06
Payer: MEDICARE

## 2025-01-06 VITALS
WEIGHT: 217 LBS | RESPIRATION RATE: 14 BRPM | BODY MASS INDEX: 32.89 KG/M2 | HEIGHT: 68 IN | OXYGEN SATURATION: 97 % | HEART RATE: 84 BPM | SYSTOLIC BLOOD PRESSURE: 118 MMHG | DIASTOLIC BLOOD PRESSURE: 60 MMHG

## 2025-01-06 DIAGNOSIS — E03.9 HYPOTHYROIDISM, UNSPECIFIED TYPE: Primary | ICD-10-CM

## 2025-01-06 DIAGNOSIS — E66.811 CLASS 1 OBESITY WITH SERIOUS COMORBIDITY AND BODY MASS INDEX (BMI) OF 34.0 TO 34.9 IN ADULT, UNSPECIFIED OBESITY TYPE: ICD-10-CM

## 2025-01-06 DIAGNOSIS — N18.31 STAGE 3A CHRONIC KIDNEY DISEASE (HCC): ICD-10-CM

## 2025-01-06 PROCEDURE — 1159F MED LIST DOCD IN RCRD: CPT | Performed by: INTERNAL MEDICINE

## 2025-01-06 PROCEDURE — G8399 PT W/DXA RESULTS DOCUMENT: HCPCS | Performed by: INTERNAL MEDICINE

## 2025-01-06 PROCEDURE — 99214 OFFICE O/P EST MOD 30 MIN: CPT | Performed by: INTERNAL MEDICINE

## 2025-01-06 PROCEDURE — 3078F DIAST BP <80 MM HG: CPT | Performed by: INTERNAL MEDICINE

## 2025-01-06 PROCEDURE — 3017F COLORECTAL CA SCREEN DOC REV: CPT | Performed by: INTERNAL MEDICINE

## 2025-01-06 PROCEDURE — M1299 PR FLU IMMUNIZE ORDER/ADMIN: HCPCS | Performed by: INTERNAL MEDICINE

## 2025-01-06 PROCEDURE — 1036F TOBACCO NON-USER: CPT | Performed by: INTERNAL MEDICINE

## 2025-01-06 PROCEDURE — G8417 CALC BMI ABV UP PARAM F/U: HCPCS | Performed by: INTERNAL MEDICINE

## 2025-01-06 PROCEDURE — 1090F PRES/ABSN URINE INCON ASSESS: CPT | Performed by: INTERNAL MEDICINE

## 2025-01-06 PROCEDURE — 99213 OFFICE O/P EST LOW 20 MIN: CPT | Performed by: INTERNAL MEDICINE

## 2025-01-06 PROCEDURE — 3074F SYST BP LT 130 MM HG: CPT | Performed by: INTERNAL MEDICINE

## 2025-01-06 PROCEDURE — G8427 DOCREV CUR MEDS BY ELIG CLIN: HCPCS | Performed by: INTERNAL MEDICINE

## 2025-01-06 PROCEDURE — 1123F ACP DISCUSS/DSCN MKR DOCD: CPT | Performed by: INTERNAL MEDICINE

## 2025-01-06 RX ORDER — HYDROCORTISONE 25 MG/G
CREAM TOPICAL
Qty: 30 G | Refills: 1 | Status: SHIPPED | OUTPATIENT
Start: 2025-01-06

## 2025-01-06 RX ORDER — LANOLIN ALCOHOL/MO/W.PET/CERES
CREAM (GRAM) TOPICAL
Qty: 480 ML | Refills: 5 | Status: SHIPPED | OUTPATIENT
Start: 2025-01-06

## 2025-01-06 RX ORDER — HYDROCORTISONE 25 MG/G
CREAM TOPICAL
Qty: 3.5 G | Refills: 1 | Status: SHIPPED | OUTPATIENT
Start: 2025-01-06 | End: 2025-01-06 | Stop reason: SDUPTHER

## 2025-01-06 RX ORDER — HYDROCORTISONE 25 MG/G
CREAM TOPICAL
Qty: 3.5 EACH | Refills: 1 | Status: SHIPPED | OUTPATIENT
Start: 2025-01-06 | End: 2025-01-06

## 2025-01-06 RX ORDER — PHENTERMINE HYDROCHLORIDE 37.5 MG/1
37.5 TABLET ORAL
Qty: 30 TABLET | Refills: 0 | Status: SHIPPED | OUTPATIENT
Start: 2025-01-06 | End: 2025-02-05

## 2025-01-06 RX ORDER — LANOLIN ALCOHOL/MO/W.PET/CERES
CREAM (GRAM) TOPICAL
Qty: 480 ML | Refills: 5 | Status: SHIPPED | OUTPATIENT
Start: 2025-01-06 | End: 2025-01-06

## 2025-01-06 NOTE — PROGRESS NOTES
Jeannine Gonzalez (:  1955) is a 69 y.o. female, Established patient, here for evaluation of the following chief complaint(s):  Hypertension          Subjective   History of Present Illness  The patient presents for evaluation of hypothyroidism, chronic kidney disease stage 3, and pruritus in her ears.    She reports experiencing fatigue and brittle fingernails, which she attributes to her thyroid condition. She also notes hair loss and constipation. Her mother had hyperthyroidism. Her current medication regimen includes a daily dose of 150 mcg of thyroid hormone, with an increased dose of 175 mcg on .    She acknowledges inadequate fluid intake, resulting in morning dryness. She finds it challenging to consume water during the winter due to persistent cold sensations.    She experiences persistent itching in her ears, leading to the formation of scabs and flakes. The itching is more pronounced during the summer months. She has been using peroxide for ear cleaning, although infrequently, and also uses Q-tips. She speculates that hairspray may be entering her ears. She has a scheduled appointment with a dermatologist at [facility] this month for evaluation of dark spots on her face. Previous treatment with Debrox was ineffective.    FAMILY HISTORY  Her mother had hyperthyroidism.    MEDICATIONS  Current: Levothyroxine, Debrox (discontinued).    Past Medical History:   Diagnosis Date    Arthritis     both knees    Hyperlipidemia     meds > 10 yrs    Hypertension     meds > 4 yrs    Hyperthyroidism     Hypothyroidism     meds > 20 yrs    Palpitations 2021    Thyroid goiter      Past Surgical History:   Procedure Laterality Date    APPENDECTOMY      with left oophorectomy    BREAST BIOPSY Left     benign bx    CARPAL TUNNEL RELEASE Right 2021    CARPAL TUNNEL RELEASE/REPAIR RIGHT performed by Florence Olivares MD at [facility] [place]    CARPAL TUNNEL RELEASE Right 2021    CARPAL

## 2025-01-06 NOTE — TELEPHONE ENCOUNTER
Pharmacy comment: Rx Change Request : COMES IN PACKS OF 28.35G FOR TOPICAL USE. 3.5G IS SIZE OF AN EYE OINTMENT WHICH THIS IS NOT, Script Clarification.

## 2025-02-15 NOTE — TELEPHONE ENCOUNTER
requesting medication refill. Please approve or deny this request.    Rx requested:  Requested Prescriptions     Pending Prescriptions Disp Refills    levothyroxine (SYNTHROID) 150 MCG tablet [Pharmacy Med Name: LEVOTHYROXINE 150 MCG TABLET] 90 tablet 2     Sig: TAKE 1 TABLET BY MOUTH EVERY DAY         Last Office Visit:   1/6/2025      Next Visit Date:  Future Appointments   Date Time Provider Department Center   4/4/2025  9:30 AM Favian Hyman MD [facility]   8/4/2025 10:15 AM Jm Mayes MD [facility]   8/22/2025  8:30 AM Alexis Figueroa DO [facility]

## 2025-02-17 RX ORDER — LEVOTHYROXINE SODIUM 150 UG/1
150 TABLET ORAL DAILY
Qty: 90 TABLET | Refills: 2 | Status: SHIPPED | OUTPATIENT
Start: 2025-02-17

## 2025-02-19 ENCOUNTER — PREP FOR PROCEDURE (OUTPATIENT)
Dept: GASTROENTEROLOGY | Age: 70
End: 2025-02-19

## 2025-02-20 RX ORDER — SODIUM CHLORIDE 0.9 % (FLUSH) 0.9 %
5-40 SYRINGE (ML) INJECTION EVERY 12 HOURS SCHEDULED
Status: CANCELLED | OUTPATIENT
Start: 2025-02-20

## 2025-02-20 RX ORDER — SODIUM CHLORIDE 9 MG/ML
INJECTION, SOLUTION INTRAVENOUS PRN
Status: CANCELLED | OUTPATIENT
Start: 2025-02-20

## 2025-02-20 RX ORDER — SODIUM CHLORIDE 0.9 % (FLUSH) 0.9 %
5-40 SYRINGE (ML) INJECTION PRN
Status: CANCELLED | OUTPATIENT
Start: 2025-02-20

## 2025-02-20 RX ORDER — SODIUM CHLORIDE 9 MG/ML
INJECTION, SOLUTION INTRAVENOUS CONTINUOUS
Status: CANCELLED | OUTPATIENT
Start: 2025-02-20

## 2025-02-24 ENCOUNTER — HOSPITAL ENCOUNTER (OUTPATIENT)
Age: 70
Setting detail: OUTPATIENT SURGERY
Discharge: HOME | End: 2025-02-24
Attending: INTERNAL MEDICINE | Admitting: INTERNAL MEDICINE
Payer: MEDICARE

## 2025-02-24 ENCOUNTER — ANESTHESIA EVENT (OUTPATIENT)
Dept: ENDOSCOPY | Age: 70
End: 2025-02-24
Payer: MEDICARE

## 2025-02-24 ENCOUNTER — ANESTHESIA (OUTPATIENT)
Dept: ENDOSCOPY | Age: 70
End: 2025-02-24
Payer: MEDICARE

## 2025-02-24 VITALS
OXYGEN SATURATION: 94 % | WEIGHT: 213 LBS | HEIGHT: 68 IN | HEART RATE: 63 BPM | RESPIRATION RATE: 18 BRPM | BODY MASS INDEX: 32.28 KG/M2 | DIASTOLIC BLOOD PRESSURE: 64 MMHG | SYSTOLIC BLOOD PRESSURE: 109 MMHG | TEMPERATURE: 98.8 F

## 2025-02-24 DIAGNOSIS — Z12.11 SCREEN FOR COLON CANCER: ICD-10-CM

## 2025-02-24 PROCEDURE — 3700000000 HC ANESTHESIA ATTENDED CARE: Performed by: INTERNAL MEDICINE

## 2025-02-24 PROCEDURE — 7100000010 HC PHASE II RECOVERY - FIRST 15 MIN: Performed by: INTERNAL MEDICINE

## 2025-02-24 PROCEDURE — 2580000003 HC RX 258

## 2025-02-24 PROCEDURE — 3700000001 HC ADD 15 MINUTES (ANESTHESIA): Performed by: INTERNAL MEDICINE

## 2025-02-24 PROCEDURE — 2500000003 HC RX 250 WO HCPCS: Performed by: INTERNAL MEDICINE

## 2025-02-24 PROCEDURE — 2709999900 HC NON-CHARGEABLE SUPPLY: Performed by: INTERNAL MEDICINE

## 2025-02-24 PROCEDURE — 7100000011 HC PHASE II RECOVERY - ADDTL 15 MIN: Performed by: INTERNAL MEDICINE

## 2025-02-24 PROCEDURE — 88305 TISSUE EXAM BY PATHOLOGIST: CPT

## 2025-02-24 PROCEDURE — 3609027000 HC COLONOSCOPY: Performed by: INTERNAL MEDICINE

## 2025-02-24 RX ORDER — SODIUM CHLORIDE 9 MG/ML
INJECTION, SOLUTION INTRAVENOUS CONTINUOUS
Status: DISCONTINUED | OUTPATIENT
Start: 2025-02-24 | End: 2025-02-24 | Stop reason: HOSPADM

## 2025-02-24 RX ORDER — SODIUM CHLORIDE 9 MG/ML
INJECTION, SOLUTION INTRAVENOUS
Status: COMPLETED
Start: 2025-02-24 | End: 2025-02-24

## 2025-02-24 RX ORDER — SODIUM CHLORIDE 9 MG/ML
INJECTION, SOLUTION INTRAVENOUS PRN
Status: DISCONTINUED | OUTPATIENT
Start: 2025-02-24 | End: 2025-02-24 | Stop reason: HOSPADM

## 2025-02-24 RX ORDER — PROPOFOL 10 MG/ML
INJECTION, EMULSION INTRAVENOUS
Status: DISCONTINUED | OUTPATIENT
Start: 2025-02-24 | End: 2025-02-24 | Stop reason: SDUPTHER

## 2025-02-24 RX ORDER — SODIUM CHLORIDE 0.9 % (FLUSH) 0.9 %
5-40 SYRINGE (ML) INJECTION EVERY 12 HOURS SCHEDULED
Status: DISCONTINUED | OUTPATIENT
Start: 2025-02-24 | End: 2025-02-24 | Stop reason: HOSPADM

## 2025-02-24 RX ORDER — SODIUM CHLORIDE 0.9 % (FLUSH) 0.9 %
5-40 SYRINGE (ML) INJECTION PRN
Status: DISCONTINUED | OUTPATIENT
Start: 2025-02-24 | End: 2025-02-24 | Stop reason: HOSPADM

## 2025-02-24 RX ADMIN — SODIUM CHLORIDE: 0.9 INJECTION, SOLUTION INTRAVENOUS at 07:24

## 2025-02-24 RX ADMIN — SODIUM CHLORIDE: 9 INJECTION, SOLUTION INTRAVENOUS at 07:24

## 2025-02-24 RX ADMIN — PROPOFOL 150 MCG/KG/MIN: 10 INJECTION, EMULSION INTRAVENOUS at 08:09

## 2025-02-24 ASSESSMENT — PAIN - FUNCTIONAL ASSESSMENT
PAIN_FUNCTIONAL_ASSESSMENT: 0-10
PAIN_FUNCTIONAL_ASSESSMENT: NONE - DENIES PAIN
PAIN_FUNCTIONAL_ASSESSMENT: NONE - DENIES PAIN

## 2025-02-24 NOTE — ANESTHESIA POSTPROCEDURE EVALUATION
Department of Anesthesiology  Postprocedure Note    Patient: Jeannine Gonzalez  MRN: [id number]  YOB: 1955  Date of evaluation: 2/24/2025    Procedure Summary       Date: 02/24/25 Room / Location: [facility] OR 02 / [facility]    Anesthesia Start: 0803 Anesthesia Stop: 0832    Procedure: COLORECTAL CANCER SCREENING, HIGH RISK with polypectomy Diagnosis:       Screen for colon cancer      (Screen for colon cancer [Z12.11])    Surgeons: Harvey Driver MD Responsible Provider: Janae Rodríguez APRN - CRNA    Anesthesia Type: MAC ASA Status: 2            Anesthesia Type: No value filed.    Joseline Phase I: Joseline Score: 10    Joseline Phase II:      Anesthesia Post Evaluation    Patient location during evaluation: bedside  Patient participation: waiting for patient participation  Level of consciousness: responsive to light touch  Pain score: 0  Airway patency: patent  Nausea & Vomiting: no nausea and no vomiting  Cardiovascular status: blood pressure returned to baseline and hemodynamically stable  Respiratory status: acceptable, nonlabored ventilation, spontaneous ventilation and nasal cannula  Hydration status: euvolemic  Pain management: adequate        No notable events documented.

## 2025-02-24 NOTE — H&P
Patient Name: Jeannine Gonzalez  : 1955  MRN: [id number]  DATE: 25      ENDOSCOPY  History and Physical    Procedure:    [] Diagnostic Colonoscopy       [x] Screening Colonoscopy  [] EGD      [] ERCP      [] EUS       [] Other    [x] Previous office notes/History and Physical reviewed from the patients chart. Please see EMR for further details of HPI. I have examined the patient's status immediately prior to the procedure and:      Indications/HPI:    []Abdominal Pain   []Cancer- GI/Lung  [x]Fhx of colon CA  []History of Polyps   []Alford’s   []Melena  []Abnormal Imaging   []Dysphagia    []Persistent Pneumonia  []Anemia   []Food Impaction  [x]History of Polyps  []GI Bleed   []Pulmonary nodule/Mass  []Change in bowel habits  []Heartburn/Reflux  []Rectal Bleed (BRBPR)  []Chest Pain - Non Cardiac  []Heme (+) Stool  []Ulcers  []Constipation   []Hemoptysis   []Varices  []Diarrhea   []Hypoxemia  []Nausea/Vomiting   []Screening   []Crohns/Colitis  []Other:    Anesthesia:   [x] MAC [] Moderate Sedation   [] General   [] None     ROS: 12 pt Review of Symptoms was negative unless mentioned above    Medications:   Prior to Admission medications    Medication Sig Start Date End Date Taking? Authorizing Provider   levothyroxine (SYNTHROID) 150 MCG tablet TAKE 1 TABLET BY MOUTH EVERY DAY 25  Yes Favian Hyman MD   Emollient (EUCERIN) lotion Apply topically as needed. 25  Yes Favian Hyman MD   hydrocortisone 2.5 % cream Apply topically 2 times daily. 25  Yes Favian Hyman MD   pravastatin (PRAVACHOL) 40 MG tablet TAKE 1 TABLET BY MOUTH EVERY DAY 24  Yes Favian Hyman MD   amLODIPine (NORVASC) 5 MG tablet TAKE 1 TABLET BY MOUTH EVERY DAY 24  Yes Favian Hyman MD   clotrimazole-betamethasone (LOTRISONE) 1-0.05 % cream Apply to affected area twice daily as needed 24  Yes Alexis Figueroa DO   erythromycin (ROMYCIN) 5 MG/GM ophthalmic ointment  21  Yes Provider,

## 2025-02-24 NOTE — ANESTHESIA PRE PROCEDURE
Department of Anesthesiology  Preprocedure Note       Name:  Jeannine Gnozalez   Age:  70 y.o.  :  1955                                          MRN:  [id number]         Date:  2025      Surgeon: Surgeon(s):  Harvey Driver MD    Procedure: Procedure(s):  COLORECTAL CANCER SCREENING, HIGH RISK    Medications prior to admission:   Prior to Admission medications    Medication Sig Start Date End Date Taking? Authorizing Provider   levothyroxine (SYNTHROID) 150 MCG tablet TAKE 1 TABLET BY MOUTH EVERY DAY 25  Yes Favian Hyman MD   Emollient (EUCERIN) lotion Apply topically as needed. 25  Yes Favian Hyman MD   hydrocortisone 2.5 % cream Apply topically 2 times daily. 25  Yes Favian Hyman MD   pravastatin (PRAVACHOL) 40 MG tablet TAKE 1 TABLET BY MOUTH EVERY DAY 24  Yes Favian Hyman MD   amLODIPine (NORVASC) 5 MG tablet TAKE 1 TABLET BY MOUTH EVERY DAY 24  Yes Favian Hyman MD   clotrimazole-betamethasone (LOTRISONE) 1-0.05 % cream Apply to affected area twice daily as needed 24  Yes Alexis Figueroa DO   erythromycin (ROMYCIN) 5 MG/GM ophthalmic ointment  21  Yes Filiberto Bergeron MD   hydrocortisone 2.5 % cream Apply topically 2 times daily. 25   Favian Hyman MD   estradiol (CLIMARA) 0.05 MG/24HR Place 1 patch onto the skin once a week 24   Alexis Figueroa DO   meloxicam (MOBIC) 15 MG tablet Take 1 tablet by mouth daily 24  ProviderFiliberto MD       Current medications:    Current Facility-Administered Medications   Medication Dose Route Frequency Provider Last Rate Last Admin    0.9 % sodium chloride infusion   IntraVENous Continuous Harvey Driver MD        sodium chloride flush 0.9 % injection 5-40 mL  5-40 mL IntraVENous 2 times per day Harvey Driver MD        sodium chloride flush 0.9 % injection 5-40 mL  5-40 mL IntraVENous PRN Harvey Driver MD        0.9 % sodium chloride infusion   IntraVENous PRN Harvey Driver

## 2025-04-01 ENCOUNTER — RESULTS FOLLOW-UP (OUTPATIENT)
Age: 70
End: 2025-04-01

## 2025-04-01 DIAGNOSIS — E03.9 HYPOTHYROIDISM, UNSPECIFIED TYPE: ICD-10-CM

## 2025-04-01 LAB — TSH REFLEX: 1.18 UIU/ML (ref 0.44–3.86)

## 2025-04-03 NOTE — PROGRESS NOTES
Jeannine Gonzalez (:  1955) is a 70 y.o. female, Established patient, here for evaluation of the following chief complaint(s):  No chief complaint on file.          Subjective   History of Present Illness    The patient presents for weight management, hypothyroidism, and lower extremity edema.    Weight status is reported as satisfactory but not optimal. Current weight is 220 pounds, an increase from the home-recorded weight of 217 pounds. She has previously consulted a nutritionist at the [facility] regarding kidney stones and received dietary advice to prevent stone formation. Regular exercise is maintained, including walking when weather conditions permit. Adipex has been prescribed, which is beneficial during the day and suppresses appetite. However, it causes insomnia, even with the use of Unisom. Adipex was taken at 8:00 AM, but extended wakefulness by an hour each day over a five-day period, leading to discontinuation. Resuming Adipex at a reduced dosage and earlier time is being considered to mitigate its impact on sleep. Plans to eliminate coffee from the diet are also mentioned.    A history of hypothyroidism is noted, with no experience of hyperthyroidism. Her mother had hyperthyroidism.    Significant foot swelling was experienced during a recent week-long trip, attributed to prolonged sitting at the airport for 21 hours. The swelling resolved upon return home.      Essential hypertension: Compliant with Norvasc 5 mg orally daily.  Her blood pressure is presently 110/76.    FAMILY HISTORY  Her father passed away from colon cancer and had a huge tumor. Her mother had hyperthyroidism.    Past Medical History:   Diagnosis Date    Arthritis     both knees    Hyperlipidemia     meds > 10 yrs    Hypertension     meds > 4 yrs    Hyperthyroidism     Hypothyroidism     meds > 20 yrs    Palpitations 2021    Thyroid goiter      Past Surgical History:   Procedure Laterality Date

## 2025-04-04 ENCOUNTER — OFFICE VISIT (OUTPATIENT)
Age: 70
End: 2025-04-04
Payer: MEDICARE

## 2025-04-04 VITALS
HEART RATE: 78 BPM | OXYGEN SATURATION: 97 % | BODY MASS INDEX: 33.34 KG/M2 | WEIGHT: 220 LBS | HEIGHT: 68 IN | RESPIRATION RATE: 14 BRPM | DIASTOLIC BLOOD PRESSURE: 76 MMHG | SYSTOLIC BLOOD PRESSURE: 110 MMHG

## 2025-04-04 DIAGNOSIS — E66.811 CLASS 1 OBESITY WITH SERIOUS COMORBIDITY AND BODY MASS INDEX (BMI) OF 34.0 TO 34.9 IN ADULT, UNSPECIFIED OBESITY TYPE: ICD-10-CM

## 2025-04-04 DIAGNOSIS — E03.9 HYPOTHYROIDISM, UNSPECIFIED TYPE: Primary | ICD-10-CM

## 2025-04-04 DIAGNOSIS — I10 ESSENTIAL HYPERTENSION: ICD-10-CM

## 2025-04-04 DIAGNOSIS — E78.5 HYPERLIPIDEMIA, UNSPECIFIED HYPERLIPIDEMIA TYPE: ICD-10-CM

## 2025-04-04 DIAGNOSIS — N18.31 STAGE 3A CHRONIC KIDNEY DISEASE (HCC): ICD-10-CM

## 2025-04-04 PROCEDURE — 99213 OFFICE O/P EST LOW 20 MIN: CPT | Performed by: INTERNAL MEDICINE

## 2025-04-04 PROCEDURE — 1123F ACP DISCUSS/DSCN MKR DOCD: CPT | Performed by: INTERNAL MEDICINE

## 2025-04-04 PROCEDURE — 3078F DIAST BP <80 MM HG: CPT | Performed by: INTERNAL MEDICINE

## 2025-04-04 PROCEDURE — 1159F MED LIST DOCD IN RCRD: CPT | Performed by: INTERNAL MEDICINE

## 2025-04-04 PROCEDURE — G8417 CALC BMI ABV UP PARAM F/U: HCPCS | Performed by: INTERNAL MEDICINE

## 2025-04-04 PROCEDURE — G8399 PT W/DXA RESULTS DOCUMENT: HCPCS | Performed by: INTERNAL MEDICINE

## 2025-04-04 PROCEDURE — 1036F TOBACCO NON-USER: CPT | Performed by: INTERNAL MEDICINE

## 2025-04-04 PROCEDURE — 3074F SYST BP LT 130 MM HG: CPT | Performed by: INTERNAL MEDICINE

## 2025-04-04 PROCEDURE — 1090F PRES/ABSN URINE INCON ASSESS: CPT | Performed by: INTERNAL MEDICINE

## 2025-04-04 PROCEDURE — G8427 DOCREV CUR MEDS BY ELIG CLIN: HCPCS | Performed by: INTERNAL MEDICINE

## 2025-04-04 PROCEDURE — 3017F COLORECTAL CA SCREEN DOC REV: CPT | Performed by: INTERNAL MEDICINE

## 2025-04-04 PROCEDURE — 99214 OFFICE O/P EST MOD 30 MIN: CPT | Performed by: INTERNAL MEDICINE

## 2025-04-04 RX ORDER — HYDROCORTISONE 25 MG/G
CREAM TOPICAL
Qty: 30 G | Refills: 1 | Status: SHIPPED | OUTPATIENT
Start: 2025-04-04

## 2025-04-04 SDOH — ECONOMIC STABILITY: FOOD INSECURITY: WITHIN THE PAST 12 MONTHS, THE FOOD YOU BOUGHT JUST DIDN'T LAST AND YOU DIDN'T HAVE MONEY TO GET MORE.: NEVER TRUE

## 2025-04-04 SDOH — ECONOMIC STABILITY: FOOD INSECURITY: WITHIN THE PAST 12 MONTHS, YOU WORRIED THAT YOUR FOOD WOULD RUN OUT BEFORE YOU GOT MONEY TO BUY MORE.: NEVER TRUE

## 2025-04-14 ENCOUNTER — HOSPITAL ENCOUNTER (OUTPATIENT)
Dept: RADIOLOGY | Facility: CLINIC | Age: 70
Discharge: HOME | End: 2025-04-14
Payer: MEDICARE

## 2025-04-14 ENCOUNTER — OFFICE VISIT (OUTPATIENT)
Dept: ORTHOPEDIC SURGERY | Facility: CLINIC | Age: 70
End: 2025-04-14
Payer: MEDICARE

## 2025-04-14 DIAGNOSIS — M25.561 RIGHT KNEE PAIN, UNSPECIFIED CHRONICITY: ICD-10-CM

## 2025-04-14 DIAGNOSIS — M76.31 ILIOTIBIAL BAND SYNDROME OF RIGHT SIDE: Primary | ICD-10-CM

## 2025-04-14 PROCEDURE — 99213 OFFICE O/P EST LOW 20 MIN: CPT | Performed by: ORTHOPAEDIC SURGERY

## 2025-04-14 PROCEDURE — 73562 X-RAY EXAM OF KNEE 3: CPT | Mod: RT

## 2025-04-14 NOTE — PROGRESS NOTES
History: Sophia is here for her right knee. She is status post right total knee arthroplasty on 4/14/22.  She has had pain on the outside of the knee for the last 3 weeks.  She denies any specific injury.  She started taking Mobic which seems to help some.    Past medical history: Multiple  Medications: Multiple  Allergies: No known drug allergies    Please refer to the intake H&P regarding the patient's review of systems, family history and social history as was done today    HEENT: Normal  Lungs: Clear to auscultation  Heart: RRR  Abdomen: Soft, nontender  Skin: clear  Extremity: She has tenderness laterally around the IT band and Gerdy's tubercle.  She has full range of motion of the knee.  Stable posterior drawer.  Mild joint swelling with no redness or warmth.  Previous wound is well-healed.  No numbness or tingling distally.  Contralateral exam is normal for strength, motion, stability and neurovascular assessment.    Radiographs: 3 view right knee x-rays show good alignment of her total knee arthroplasty with no evidence of loosening.    Assessment: Stable right total knee arthroplasty 3 years out with IT band syndrome    Plan: She has developed a bit of tightness and pain to her IT band laterally.  We discussed a one-time therapy visit with a home exercise program.  She can ice and continue with her meloxicam as needed.  We will see her back in 6 weeks if not improving otherwise she can follow-up as needed.  All questions were answered today with the patient.  For complete plan and/or surgical details, please refer to Dr. Lorenz's portion of this split/shared dictation.     Martha Flores PA-C    In a face-to-face encounter today, MELODIE Lorenz MD performed a history and physical examination, discussed pertinent diagnostic studies if indicated, and discussed diagnosis and management strategies with both the patient and the midlevel provider.  I reviewed the midlevel's note and agree with the  documented findings and plan of care.  Greater than 50% of the evaluation and treatment decision was performed by the physician myself during today's visit.    Sophia is having more lateral sided knee pain.  She has pain with provocative IT band maneuvers as well.  At this point she is willing to get into some formal physical therapy and use anti-inflammatory medication.  Her x-rays otherwise look stable.  If improving over the next 6 weeks she can see us back as needed.        This note was generated with voice recognition software and may contain grammatical errors.

## 2025-04-30 ENCOUNTER — EVALUATION (OUTPATIENT)
Dept: PHYSICAL THERAPY | Facility: HOSPITAL | Age: 70
End: 2025-04-30
Payer: MEDICARE

## 2025-04-30 DIAGNOSIS — M25.561 ACUTE PAIN OF RIGHT KNEE: ICD-10-CM

## 2025-04-30 DIAGNOSIS — M76.31 ILIOTIBIAL BAND SYNDROME OF RIGHT SIDE: Primary | ICD-10-CM

## 2025-04-30 PROCEDURE — 97140 MANUAL THERAPY 1/> REGIONS: CPT | Mod: GP

## 2025-04-30 PROCEDURE — 97110 THERAPEUTIC EXERCISES: CPT | Mod: GP

## 2025-04-30 PROCEDURE — 97161 PT EVAL LOW COMPLEX 20 MIN: CPT | Mod: GP

## 2025-04-30 ASSESSMENT — PATIENT HEALTH QUESTIONNAIRE - PHQ9
SUM OF ALL RESPONSES TO PHQ9 QUESTIONS 1 AND 2: 0
2. FEELING DOWN, DEPRESSED OR HOPELESS: NOT AT ALL
1. LITTLE INTEREST OR PLEASURE IN DOING THINGS: NOT AT ALL

## 2025-04-30 NOTE — PROGRESS NOTES
Physical Therapy    Physical Therapy Evaluation and Treatment      Patient Name: Sophia Granados  MRN: [id number]  Today's Date: 4/30/2025    Time Entry:   Time Calculation  Start Time: 0813  Stop Time: 0854  Time Calculation (min): 41 min  PT Evaluation Time Entry  PT Evaluation (Low) Time Entry: 15  PT Therapeutic Procedures Time Entry  Manual Therapy Time Entry: 10  Therapeutic Exercise Time Entry: 16                 Insurance:  MEDICARE/ MMO                            COPAY 0   2410($0 USED ) (MET) COVERAGE 80/100 OOP 0      MMO TO FOLLOW MEDICARE  NO AUTH REQ                             [id number]   Visit: 1/4    Assessment:  PT Assessment  PT Assessment Results: Decreased strength, Decreased endurance, Decreased mobility, Pain  Rehab Prognosis: Good  Evaluation/Treatment Tolerance: Patient tolerated treatment well  Strengths: Ability to acquire knowledge, Attitude of self     Patient is a  69 y/o female presents with c/o R iliotibial band pain x6 weeks. Upon examination patient demonstrates decreased R hip strength, tenderness along R iliotibial band and special tests showing possible hip pathology limiting overall functional mobility including ability to sit for prolonged periods of time and to be able to ambulate long distances. Pt to benefit from outpatient PT to address deficits, maximize functional mobility and improve QOL.  The clinical presentation of this patient is stable and their history and examination findings are consistent with a low complexity evaluation with good rehab potential.        Plan:  OP PT Plan  Treatment/Interventions: Dry needling, Education/ Instruction, Gait training, Manual therapy, Neuromuscular re-education, Self care/ home management, Taping techniques, Therapeutic activities, Therapeutic exercises  PT Plan: Skilled PT  PT Frequency: 1 time per week  Duration: 4 visits  Onset Date: 03/10/25  Certification Period Start Date: 04/30/25  Certification Period End Date:  "07/29/25  Rehab Potential: Good  Plan of Care Agreement: Patient    Current Problem:   1. Iliotibial band syndrome of right side  Referral to Physical Therapy    Follow Up In Physical Therapy      2. Acute pain of right knee  Follow Up In Physical Therapy          Subjective    General:  Medical History Form: Reviewed (scanned into chart)    Subjective:     Chief Complaint: Patient presents to clinic R knee pain. Pain is lateral and sometimes going across patellar tendon. Pain gets worse with sitting >15 minutes. Intermittent pain in R anterior hip. Intermittent pain in lumbar spine. Sometimes intermittent R glute pain.   Sat for dinner last night for 2 hours and she had to get up twice.   No new onset of numbness and tingling along pain route     Onset Date: 6 weeks  LILIYA: none     Current Condition:   Same    Pain:     Location: R iliotibial band   Description: burning and tight  Aggravating Factors:  sitting   Relieving Factors:  walking around, meloxicam     Relevant Information (PMH & Previous Tests/Imaging): xray normal   Previous Interventions/Treatments: None    Prior Level of Function (PLOF)  Patient previously independent with all ADLs  Work/School: retired    Patients Living Environment: Reviewed and no concern    Primary Language: English    Patient's Goal(s) for Therapy: \"less pain or no pain    Red Flags: Do you have any of the following? No  Fever/chills, unexplained weight changes, dizziness/fainting, unexplained change in bowel or bladder functions, unexplained malaise or muscle weakness, night pain/sweats, numbness or tingling    General  Reason for Referral: R iliotibial band  Referred By: Dr. Haile Lorenz  Past Medical History Relevant to Rehab: HTN, hypothyroidism, B TKA, cholecystectomy, hysteretcomy       Home Living: lives with spouse     Prior Level of Function:  Prior Function Per Pt/Caregiver Report  Level of [place]: Independent with ADLs and functional transfers    Objective        "    PALPATION: R piriformis, R iliotibial band, R patellar tendon               GAIT: no deviations appreciated         AROM  Right hip flexion: WFL      Left hip flexion: WFL    Right hip extension: WFL      Left hip extension: WFL    Right hip abduction:  WFL    Left hip abduction: WFL    Right hip ER seated: WFL     Left hip ER seated: WFL    Right hip IR seated: WFL     Left hip IR seated: WFL          MMT  Right hip ER: 4-/5      Left hip ER: 4-/5    Right hip IR:  4-/5    Left hip IR: 4-/5    Right quadriceps: 4-/5      Left quadriceps: 4/5    Right iliopsoas: 4-/5      Left iliopsoas: 4/5    Right gluteus medius: 3+/5      Left gluteus medius: 4-/5    Right gluteus antony: 4-/5      Left gluteus antony: 4/5    Right hamstrin-/5     Left hamstrin/5      Flexibility  Iliopsoas right: good     Iliopsoas left: good    Rectus femoris right: good      Rectus femoris left: good    Hamstring right: good      Hamstring left: good    Piriformis right: fair     Piriformis left: good    ITB right: fair     ITB left: good       Special Tests:     Scour: Right -    FADIR: Right +     DANIELA: Right +    Ely's: Right -    Krunal's: Right -         Outcome Measures:  Other Measures  Lower Extremity Funtional Score (LEFS): 65     Treatments:  Hamstring stretch: 2x30 seconds  Iliotibial band stretch: 2x30 seconds  Piriformis stretch with strap: 2x30 seconds  Side lying hip abduction: 2x10    Manual  Rolling R iliotibial band  STM R iliotibial band/hamstring    Access Code: [email or web address]  URL: [email or web address]  Date: 2025  Prepared by: Tere Jenkins    Exercises  - Supine ITB Stretch with Strap  - 1 x daily - 7 x weekly - 3 sets - 1 reps - 30 seconds hold  - Supine Hamstring Stretch with Strap  - 1 x daily - 7 x weekly - 3 sets - 1 reps - 30 seconds hold  - Supine Figure 4 Piriformis Stretch  - 1 x daily - 7 x weekly - 3 sets - 1 reps - 30 seconds hold  - Roller Massage  Elongated IT Band Release   - Sidelying Hip Abduction  - 1 x daily - 7 x weekly - 2-3 sets - 10 reps    EDUCATION:  Outpatient Education  Individual(s) Educated: Patient  Education Provided: Anatomy, Home Exercise Program, Physiology, POC  Risk and Benefits Discussed with Patient/Caregiver/Other: yes  Patient/Caregiver Demonstrated Understanding: yes  Plan of Care Discussed and Agreed Upon: yes  Patient Response to Education: Patient/Caregiver Verbalized Understanding of Information    Goals:  1. Patient will be independent with HEP  2. Patient will demonstrate R hip strength >/=4/5 to improve pelvic stability with mobility   3. Patient will demonstrate >/=10 point improvement in LEFS demonstrating improved functional mobility   4. Patient will report R iliotibial band pain </=2/10 with sitting for meals

## 2025-04-30 NOTE — LETTER
April 30, 2025    Tere Jenkins DPT  [address] [place], [address] [place] [address]    Patient: Sophia Granados   YOB: 1955   Date of Visit: 4/30/2025       Dear Martha Flores, RAFAL  [address]   [facility], [address] [place] [address],  [place] [address]    The attached plan of care is being sent to you because your patient’s medical reimbursement requires that you certify the plan of care. Your signature is required to allow uninterrupted insurance coverage.      You may indicate your approval by signing below and faxing this form back to us at Dept Fax: [phone].    Please call Dept: [phone] with any questions or concerns.    Thank you for this referral,        Tere Jenkins DPT  [address] [facility]  [address] [place] [id number]    Payer: Payor: MEDICARE / Plan: MEDICARE PART A AND B / Product Type: *No Product type* /                                                                         Date:     Dear Tere Jenkins DPT,     Re: Ms. Sophia Granados, MRN:[id number]    I certify that I have reviewed the attached plan of care and it is medically necessary for Ms. Sophia Granados (1955) who is under my care.          ______________________________________                    _________________  Provider name and credentials                                           Date and time                                                                                           Plan of Care 4/30/25   Effective from: 4/30/2025  Effective to: 7/29/2025    Active  Plan ID: [id number]           Participants as of 4/30/2025    Name Type Comments Contact Info    Haile Lorenz MD Referring Provider  [phone]    Tere Jenkins DPT Physical Therapist  [phone]      Last Plan Note     Author: Tere Jenkisn DPT Status: Incomplete Last edited: 4/30/2025  8:15 AM       Physical Therapy    Physical  Therapy Evaluation and Treatment      Patient Name: Sophia Granados  MRN: [id number]  Today's Date: 4/30/2025    Time Entry:   Time Calculation  Start Time: 0813  Stop Time: 0854  Time Calculation (min): 41 min  PT Evaluation Time Entry  PT Evaluation (Low) Time Entry: 15  PT Therapeutic Procedures Time Entry  Manual Therapy Time Entry: 10  Therapeutic Exercise Time Entry: 16                 Insurance:  MEDICARE/ MMO                            COPAY 0   2410($0 USED ) (MET) COVERAGE 80/100 OOP 0      MMO TO FOLLOW MEDICARE  NO AUTH REQ                             [id number]   Visit: 1    Assessment:  PT Assessment  PT Assessment Results: Decreased strength, Decreased endurance, Decreased mobility, Pain  Rehab Prognosis: Good  Evaluation/Treatment Tolerance: Patient tolerated treatment well  Strengths: Ability to acquire knowledge, Attitude of self     Patient is a  71 y/o female presents with c/o R iliotibial band pain x6 weeks. Upon examination patient demonstrates decreased R hip strength, tenderness along R iliotibial band and special tests showing possible hip pathology limiting overall functional mobility including ability to sit for prolonged periods of time and to be able to ambulate long distances. Pt to benefit from outpatient PT to address deficits, maximize functional mobility and improve QOL.  The clinical presentation of this patient is stable and their history and examination findings are consistent with a low complexity evaluation with good rehab potential.        Plan:  OP PT Plan  Treatment/Interventions: Dry needling, Education/ Instruction, Gait training, Manual therapy, Neuromuscular re-education, Self care/ home management, Taping techniques, Therapeutic activities, Therapeutic exercises  PT Plan: Skilled PT  PT Frequency: 1 time per week  Duration: 4 visits  Onset Date: 03/10/25  Certification Period Start Date: 04/30/25  Certification Period End Date: 07/29/25  Rehab Potential:  "Good  Plan of Care Agreement: Patient    Current Problem:   1. Iliotibial band syndrome of right side  Referral to Physical Therapy    Follow Up In Physical Therapy      2. Acute pain of right knee  Follow Up In Physical Therapy          Subjective   General:  Medical History Form: Reviewed (scanned into chart)    Subjective:     Chief Complaint: Patient presents to clinic R knee pain. Pain is lateral and sometimes going across patellar tendon. Pain gets worse with sitting >15 minutes. Intermittent pain in R anterior hip. Intermittent pain in lumbar spine. Sometimes intermittent R glute pain.   Sat for dinner last night for 2 hours and she had to get up twice.   No new onset of numbness and tingling along pain route     Onset Date: 6 weeks  LILIYA: none     Current Condition:   Same    Pain:     Location: R iliotibial band   Description: burning and tight  Aggravating Factors:  sitting   Relieving Factors:  walking around, meloxicam     Relevant Information (PMH & Previous Tests/Imaging): xray normal   Previous Interventions/Treatments: None    Prior Level of Function (PLOF)  Patient previously independent with all ADLs  Work/School: retired    Patients Living Environment: Reviewed and no concern    Primary Language: English    Patient's Goal(s) for Therapy: \"less pain or no pain    Red Flags: Do you have any of the following? No  Fever/chills, unexplained weight changes, dizziness/fainting, unexplained change in bowel or bladder functions, unexplained malaise or muscle weakness, night pain/sweats, numbness or tingling    General  Reason for Referral: R iliotibial band  Referred By: Dr. Haile Lorenz  Past Medical History Relevant to Rehab: HTN, hypothyroidism, B TKA, cholecystectomy, hysteretcomy       Home Living: lives with spouse     Prior Level of Function:  Prior Function Per Pt/Caregiver Report  Level of [place]: Independent with ADLs and functional transfers    Objective          PALPATION: R piriformis, " R iliotibial band, R patellar tendon               GAIT: no deviations appreciated         AROM  Right hip flexion: WFL      Left hip flexion: WFL    Right hip extension: WFL      Left hip extension: WFL    Right hip abduction:  WFL    Left hip abduction: WFL    Right hip ER seated: WFL     Left hip ER seated: WFL    Right hip IR seated: WFL     Left hip IR seated: WFL          MMT  Right hip ER: 4-/5      Left hip ER: 4-/5    Right hip IR:  4-/5    Left hip IR: 4-/5    Right quadriceps: 4-/5      Left quadriceps: 4/5    Right iliopsoas: 4-/5      Left iliopsoas: 4/5    Right gluteus medius: 3+/5      Left gluteus medius: 4-/5    Right gluteus antony: 4-/5      Left gluteus antony: 4/5    Right hamstrin-/5     Left hamstrin/5      Flexibility  Iliopsoas right: good     Iliopsoas left: good    Rectus femoris right: good      Rectus femoris left: good    Hamstring right: good      Hamstring left: good    Piriformis right: fair     Piriformis left: good    ITB right: fair     ITB left: good       Special Tests:     Scour: Right -    FADIR: Right +     DANIELA: Right +    Ely's: Right -    Krunal's: Right -         Outcome Measures:  Other Measures  Lower Extremity Funtional Score (LEFS): 65     Treatments:  Hamstring stretch: 2x30 seconds  Iliotibial band stretch: 2x30 seconds  Piriformis stretch with strap: 2x30 seconds  Side lying hip abduction: 2x10    Manual  Rolling R iliotibial band  STM R iliotibial band/hamstring    Access Code: [email or web address]  URL: [email or web address]  Date: 2025  Prepared by: Tere Jenkins    Exercises  - Supine ITB Stretch with Strap  - 1 x daily - 7 x weekly - 3 sets - 1 reps - 30 seconds hold  - Supine Hamstring Stretch with Strap  - 1 x daily - 7 x weekly - 3 sets - 1 reps - 30 seconds hold  - Supine Figure 4 Piriformis Stretch  - 1 x daily - 7 x weekly - 3 sets - 1 reps - 30 seconds hold  - Roller Massage Elongated IT Band Release   -  Sidelying Hip Abduction  - 1 x daily - 7 x weekly - 2-3 sets - 10 reps    EDUCATION:  Outpatient Education  Individual(s) Educated: Patient  Education Provided: Anatomy, Home Exercise Program, Physiology, POC  Risk and Benefits Discussed with Patient/Caregiver/Other: yes  Patient/Caregiver Demonstrated Understanding: yes  Plan of Care Discussed and Agreed Upon: yes  Patient Response to Education: Patient/Caregiver Verbalized Understanding of Information    Goals:  1. Patient will be independent with HEP  2. Patient will demonstrate R hip strength >/=4/5 to improve pelvic stability with mobility   3. Patient will demonstrate >/=10 point improvement in LEFS demonstrating improved functional mobility   4. Patient will report R iliotibial band pain </=2/10 with sitting for meals

## 2025-05-05 ENCOUNTER — TREATMENT (OUTPATIENT)
Dept: PHYSICAL THERAPY | Facility: HOSPITAL | Age: 70
End: 2025-05-05
Payer: MEDICARE

## 2025-05-05 DIAGNOSIS — M76.31 ILIOTIBIAL BAND SYNDROME OF RIGHT SIDE: ICD-10-CM

## 2025-05-05 DIAGNOSIS — M25.561 ACUTE PAIN OF RIGHT KNEE: ICD-10-CM

## 2025-05-05 PROCEDURE — 97110 THERAPEUTIC EXERCISES: CPT | Mod: GP

## 2025-05-05 PROCEDURE — 97140 MANUAL THERAPY 1/> REGIONS: CPT | Mod: GP

## 2025-05-05 ASSESSMENT — PAIN SCALES - GENERAL: PAINLEVEL_OUTOF10: 8

## 2025-05-05 ASSESSMENT — PAIN - FUNCTIONAL ASSESSMENT: PAIN_FUNCTIONAL_ASSESSMENT: 0-10

## 2025-05-05 NOTE — PROGRESS NOTES
"Physical Therapy    Physical Therapy Treatment    Patient Name: Sophia Granados  MRN: [id number]  Today's Date: 5/5/2025    Time Entry:   Time Calculation  Start Time: 1438  Stop Time: 1510  Time Calculation (min): 32 min     PT Therapeutic Procedures Time Entry  Manual Therapy Time Entry: 22  Therapeutic Exercise Time Entry: 10                 Insurance:  MEDICARE/ [employer]                            COPAY 0   2410($0 USED ) (MET) COVERAGE 80/100 OOP 0      MMO TO FOLLOW MEDICARE  NO AUTH REQ                             [id number]   Visit: 2/4       Assessment:   Pelvic appearing out of alignment this date with shotgun MET performed which correct. She did notice a cavitation with reported relief. Manual techniques performed to improve soft tissue and joint mobility. Gentle pelvic strengthening performed HEP updated  Plan:   Continue with current POC     Current Problem  1. Acute pain of right knee  Follow Up In Physical Therapy      2. Iliotibial band syndrome of right side  Follow Up In Physical Therapy          General   Woke up with pain to R SI joint. Does not recall event to cause increased bed. Not feeling pain in iliotibial band or knee. Knee does feel still.        Subjective    Precautions          Pain  Pain Assessment  Pain Assessment: 0-10  0-10 (Numeric) Pain Score: 8  Pain Type: Acute pain    Objective     L leg \"longer\"  R leg \"shorter\"     Treatments:  Hooklying hip adduction: x20  Hooklying hip abduction: GTB x20    Manual:  Shotgun MET: x1  P>A sacral mobs grade 2  Unilateral joint mobs L3-5  TPR L pirformis with hip IR/ER  TPR L glut max and med    "

## 2025-05-12 ENCOUNTER — TREATMENT (OUTPATIENT)
Dept: PHYSICAL THERAPY | Facility: HOSPITAL | Age: 70
End: 2025-05-12
Payer: MEDICARE

## 2025-05-12 DIAGNOSIS — M76.31 ILIOTIBIAL BAND SYNDROME OF RIGHT SIDE: ICD-10-CM

## 2025-05-12 DIAGNOSIS — M25.561 ACUTE PAIN OF RIGHT KNEE: ICD-10-CM

## 2025-05-12 PROCEDURE — 97140 MANUAL THERAPY 1/> REGIONS: CPT | Mod: GP,CQ

## 2025-05-12 PROCEDURE — 97110 THERAPEUTIC EXERCISES: CPT | Mod: GP,CQ

## 2025-05-12 ASSESSMENT — PAIN - FUNCTIONAL ASSESSMENT: PAIN_FUNCTIONAL_ASSESSMENT: 0-10

## 2025-05-12 ASSESSMENT — PAIN SCALES - GENERAL: PAINLEVEL_OUTOF10: 7

## 2025-05-12 NOTE — PROGRESS NOTES
Physical Therapy Treatment  5/12/2025  Patient Name: Sophia Granados  MRN: [id number]  Current Problem  1. Acute pain of right knee  Follow Up In Physical Therapy      2. Iliotibial band syndrome of right side  Follow Up In Physical Therapy        Insurance   [place] [place]  Visit 3/4  Subjective   Pt without change familiar symptoms. Pain intense along sciatic line starting R side lower back radiating into R glute and lateral thigh.  Precautions  Precautions Comment: none  Pain Assessment: 0-10  0-10 (Numeric) Pain Score: 7  Objective    Treatments  Hook-lying, 90/90 on chair, 3 minutes  -SKTC, 5 sec holds, 10 reps  -DKTC, 5 sec holds, 10 reps  -Piriformis, 5 sec holds, 10 reps      TRIALED WITH NO CHANGE FAMILIAR SYMPTOMS  Prone over 2 pillows  Prone over 1 pillow  Prone laying  R lateral side glides    Manual  STM B paraspinals  Grade 1-2 PA, uni mobs lumbar spine  Grade 1-2 sacral mobs  Assessment  Quite a lot of trouble reducing familiar pain today. Increased pain MTT lower back and R hip. Only able to alleviate some pain with hooklying on chair. Able to perform some light flexion but increase familiar pain with piriformis stretch. Discussed continued unloading at home. Updated HEP, focus on light flexion forces.   Plan:  [1] Pt due for RPT  [2]  [3]  [4]    OP EDUCATION:  Access Code: [email or web address]  URL: [email or web address]  Date: 05/12/2025  Prepared by: Rishabh Khan    Exercises  - Supine Posterior Pelvic Tilt   - Hooklying Single Knee to Chest Stretch  - 1-3 x daily - 7 x weekly - 1-3 sets - 10 reps - 5- 10 seconds hold  - Supine Double Knee to Chest Modified  - 1-3 x daily - 7 x weekly - 1-3 sets - 10 reps - 5-10 seconds hold  - Hooklying Clamshell with Resistance  - 1 x daily - 7 x weekly - 2-3 sets - 10 reps - 3-5 seconds hold  - Supine Hip Adduction Isometric with Ball  - 1 x daily - 7 x weekly - 2-33 sets - 10 reps - 3-5 seconds hold  Goals:       Time Calculation  Start Time:  0826  Stop Time: 0908  Time Calculation (min): 42 min  PT Therapeutic Procedures Time Entry  Manual Therapy Time Entry: 15  Therapeutic Exercise Time Entry: 23

## 2025-05-18 RX ORDER — AMLODIPINE BESYLATE 5 MG/1
5 TABLET ORAL DAILY
Qty: 90 TABLET | Refills: 1 | Status: SHIPPED | OUTPATIENT
Start: 2025-05-18

## 2025-05-19 ENCOUNTER — TREATMENT (OUTPATIENT)
Dept: PHYSICAL THERAPY | Facility: HOSPITAL | Age: 70
End: 2025-05-19
Payer: MEDICARE

## 2025-05-19 DIAGNOSIS — M76.31 ILIOTIBIAL BAND SYNDROME OF RIGHT SIDE: ICD-10-CM

## 2025-05-19 DIAGNOSIS — M25.561 ACUTE PAIN OF RIGHT KNEE: ICD-10-CM

## 2025-05-19 PROCEDURE — 97110 THERAPEUTIC EXERCISES: CPT | Mod: GP,CQ

## 2025-05-19 PROCEDURE — 97150 GROUP THERAPEUTIC PROCEDURES: CPT | Mod: GP,CQ

## 2025-05-19 ASSESSMENT — PAIN - FUNCTIONAL ASSESSMENT: PAIN_FUNCTIONAL_ASSESSMENT: 0-10

## 2025-05-19 NOTE — PROGRESS NOTES
Physical Therapy Treatment  5/19/2025  Patient Name: Sophia Grandaos  MRN: [id number]  Current Problem  1. Acute pain of right knee  Follow Up In Physical Therapy      2. Iliotibial band syndrome of right side  Follow Up In Physical Therapy        Insurance   [place]ic[place]  Visit 4  Subjective   Pt stating some improvement since previous visit. Pt does persist some along piriformis line, but almost all issue is relegated to groin and ant hip.  Precautions  Precautions Comment: none  Pain Assessment: 0-10  Objective    Treatments  Prone HSC, 30 reps  SLR's, supine and prone, 20 reps  Side-lying, abduction and clamshells, 20 reps  Bridges, 20 reps  Standing hip 3-way, YTB, 20 reps    Manual  Grade 1-2 lumbar and hip mobs  PROM R hip  Assessment  Greatest restriction pt mobility this point ant and groin hip pain. Lumbar and radicular pain mostly abolished to this point. Added a few ex's to strength hip stabilizers, but mostly limited in progression. Pushed back RPT until after she sees MD regarding R hip.  Plan:  [1] Pt due for RPT  [2]  [3]  [4]    OP EDUCATION:    Goals:       Time Calculation  Start Time: 1230  Stop Time: 1300  Time Calculation (min): 30 min  PT Therapeutic Procedures Time Entry  Group Therapy Time Entry: 8 ([id number])  Manual Therapy Time Entry: 5  Therapeutic Exercise Time Entry: 15

## 2025-05-28 ENCOUNTER — APPOINTMENT (OUTPATIENT)
Dept: PHYSICAL THERAPY | Facility: HOSPITAL | Age: 70
End: 2025-05-28
Payer: MEDICARE

## 2025-05-28 DIAGNOSIS — M25.561 ACUTE PAIN OF RIGHT KNEE: ICD-10-CM

## 2025-06-02 ENCOUNTER — HOSPITAL ENCOUNTER (OUTPATIENT)
Dept: RADIOLOGY | Facility: CLINIC | Age: 70
Discharge: HOME | End: 2025-06-02
Payer: MEDICARE

## 2025-06-02 ENCOUNTER — OFFICE VISIT (OUTPATIENT)
Dept: ORTHOPEDIC SURGERY | Facility: CLINIC | Age: 70
End: 2025-06-02
Payer: MEDICARE

## 2025-06-02 DIAGNOSIS — M25.511 ACUTE PAIN OF RIGHT SHOULDER: ICD-10-CM

## 2025-06-02 DIAGNOSIS — M25.551 HIP PAIN, ACUTE, RIGHT: ICD-10-CM

## 2025-06-02 DIAGNOSIS — M75.41 IMPINGEMENT SYNDROME OF RIGHT SHOULDER: ICD-10-CM

## 2025-06-02 PROCEDURE — 73502 X-RAY EXAM HIP UNI 2-3 VIEWS: CPT | Mod: RT

## 2025-06-02 PROCEDURE — 20610 DRAIN/INJ JOINT/BURSA W/O US: CPT | Mod: RT | Performed by: ORTHOPAEDIC SURGERY

## 2025-06-02 PROCEDURE — 2500000004 HC RX 250 GENERAL PHARMACY W/ HCPCS (ALT 636 FOR OP/ED): Performed by: ORTHOPAEDIC SURGERY

## 2025-06-02 PROCEDURE — 73030 X-RAY EXAM OF SHOULDER: CPT | Mod: RIGHT SIDE | Performed by: ORTHOPAEDIC SURGERY

## 2025-06-02 PROCEDURE — 73502 X-RAY EXAM HIP UNI 2-3 VIEWS: CPT | Mod: RIGHT SIDE | Performed by: ORTHOPAEDIC SURGERY

## 2025-06-02 PROCEDURE — 99213 OFFICE O/P EST LOW 20 MIN: CPT | Performed by: ORTHOPAEDIC SURGERY

## 2025-06-02 PROCEDURE — 73030 X-RAY EXAM OF SHOULDER: CPT | Mod: RT

## 2025-06-02 PROCEDURE — 99212 OFFICE O/P EST SF 10 MIN: CPT | Performed by: ORTHOPAEDIC SURGERY

## 2025-06-02 RX ORDER — LIDOCAINE HYDROCHLORIDE 10 MG/ML
5 INJECTION, SOLUTION INFILTRATION; PERINEURAL
Status: COMPLETED | OUTPATIENT
Start: 2025-06-02 | End: 2025-06-02

## 2025-06-02 RX ORDER — BETAMETHASONE SODIUM PHOSPHATE AND BETAMETHASONE ACETATE 3; 3 MG/ML; MG/ML
12 INJECTION, SUSPENSION INTRA-ARTICULAR; INTRALESIONAL; INTRAMUSCULAR; SOFT TISSUE
Status: COMPLETED | OUTPATIENT
Start: 2025-06-02 | End: 2025-06-02

## 2025-06-02 RX ADMIN — LIDOCAINE HYDROCHLORIDE 5 ML: 10 INJECTION, SOLUTION INFILTRATION; PERINEURAL at 08:49

## 2025-06-02 RX ADMIN — BETAMETHASONE ACETATE AND BETAMETHASONE SODIUM PHOSPHATE 12 MG: 3; 3 INJECTION, SUSPENSION INTRA-ARTICULAR; INTRALESIONAL; INTRAMUSCULAR; SOFT TISSUE at 08:49

## 2025-06-02 NOTE — PROGRESS NOTES
History: Sophia is here for her right knee. She is status post right total knee arthroplasty on 4/14/22. Her knee is doing well but now her hip is bothering her more. She did one session of therapy and believes she over did it. She has a significant amount of groin pain, pain with walking, and going down the stairs. It is affecting her sleep. The meloxicam seems to give her good relief. She also has complaints of right shoulder pain.     Past medical history: Multiple  Medications: Multiple  Allergies: No known drug allergies    Please refer to the intake H&P regarding the patient's review of systems, family history and social history as was done today    HEENT: Normal  Lungs: Clear to auscultation  Heart: RRR  Abdomen: Soft, nontender  Skin: clear  Extremity: She has full overhead motion of the right shoulder with 5 out of 5 strength.  No passive tightness.  There is pain with impingement overs.  Hip rotation is normal.  She does have pain with resisted hip flexion all in the groin.  Knee motion is normal with healed incisions as noted.  Contralateral exam is normal for strength, motion, stability and neurovascular assessment.    Radiographs: 3 view right knee x-rays show good alignment of her total knee arthroplasty with no evidence of loosening. X-rays of the right shoulder today show good alignment in the glenohumeral joint.  Hip x-rays show some mild superior acetabular spurring but otherwise decent joint space throughout.    Assessment: Stable right total knee arthroplasty 3 years out with IT band syndrome. Right shoulder impingement, right hip flexor strain    Plan: Overall her hip and knee are starting to improve. We again discussed several options. She can continue to work with therapy on her hip. For her shoulder, we gave her a cortisone injection in it today. We will also get her in to some therapy for range of motion, strengthening, and modalities. We will see her back as needed.  We could consider  laboratory workup for her multiple joint complaints in the future as well.    L Inj/Asp: R subacromial bursa on 6/2/2025 8:49 AM  Indications: pain  Details: 22 G needle, posterior approach  Medications: 5 mL lidocaine 10 mg/mL (1 %); 12 mg betamethasone acet,sod phos 6 mg/mL  Outcome: tolerated well, no immediate complications  Procedure, treatment alternatives, risks and benefits explained, specific risks discussed. Consent was given by the patient. Immediately prior to procedure a time out was called to verify the correct patient, procedure, equipment, support staff and site/side marked as required. Patient was prepped and draped in the usual sterile fashion.          All questions were answered today with the patient.    This note was generated with voice recognition software and may contain grammatical errors.    Scribe Attestation:  By signing my name below, I, Griselda Lal attest that this documentation has been prepared under the direction and in the presence of Haile Lorenz MD.    Provider Attestation - Scribe documentation:  All medical record entries made by Martha Dailey were at my direction and personally dictated by me, Haile Lorenz MD. I have reviewed the chart and agree that the record is accurate and I confirmed that it reflects my personal performance of the history, physical exam, discussion, and plan.

## 2025-06-04 ENCOUNTER — APPOINTMENT (OUTPATIENT)
Dept: PHYSICAL THERAPY | Facility: HOSPITAL | Age: 70
End: 2025-06-04
Payer: MEDICARE

## 2025-06-04 DIAGNOSIS — M76.31 ILIOTIBIAL BAND SYNDROME OF RIGHT SIDE: ICD-10-CM

## 2025-06-04 DIAGNOSIS — M25.561 ACUTE PAIN OF RIGHT KNEE: ICD-10-CM

## 2025-06-04 PROCEDURE — 97140 MANUAL THERAPY 1/> REGIONS: CPT | Mod: GP

## 2025-06-04 PROCEDURE — 97110 THERAPEUTIC EXERCISES: CPT | Mod: GP

## 2025-06-04 ASSESSMENT — PAIN - FUNCTIONAL ASSESSMENT: PAIN_FUNCTIONAL_ASSESSMENT: 0-10

## 2025-06-04 ASSESSMENT — PAIN SCALES - GENERAL: PAINLEVEL_OUTOF10: 3

## 2025-06-04 NOTE — PROGRESS NOTES
Physical Therapy    Physical Therapy Treatment    Patient Name: Sophia Granados  MRN: [id number]  Today's Date: 6/4/2025    Time Entry:   Time Calculation  Start Time: 0930  Stop Time: 1010  Time Calculation (min): 40 min     PT Therapeutic Procedures Time Entry  Manual Therapy Time Entry: 12  Therapeutic Exercise Time Entry: 28                 Insurance   Medicare/O  Visit 5    Assessment:   Patient has attended 5 PT visits for R hip/knee pain. She has met 1 goal at this time. Pain is now localized to R groin with tenderness to palpation at R hip flexor and R rectus femoris. Manual techniques introduced with some improvement in her symptoms. Most relief felt with hands on joint mobilizations and hip flexor stretch. HEP updated. Patient to be placed on 30 day hold for R hip d/t overall improvement in symptoms and being evaluated for R shoulder pain for PT.  Plan:   30 day hold for R hip pain     Current Problem  1. Acute pain of right knee  Follow Up In Physical Therapy      2. Iliotibial band syndrome of right side  Follow Up In Physical Therapy          General   Notes overall improvement in R hip. ~85% overall improvement   Denies lumbar pain, slight pain in R glute  Compliant with HEP       Subjective    Precautions  Precautions  Precautions Comment: none       Pain  Pain Assessment  Pain Assessment: 0-10  0-10 (Numeric) Pain Score: 3  Pain Type: Acute pain  Pain Location: Groin  Pain Orientation: Right    Objective                           GAIT: no deviations appreciated          AROM  Right hip flexion: WFL      Left hip flexion: WFL    Right hip extension: WFL      Left hip extension: WFL    Right hip abduction:  WFL    Left hip abduction: WFL    Right hip ER seated: WFL     Left hip ER seated: WFL    Right hip IR seated: WFL     Left hip IR seated: WFL           MMT  Right hip ER: 4-/5    Right hip IR:  4-/5   Right quadriceps: 4/5    Right iliopsoas: 3+/5   Right gluteus medius: 3+/5      Right  gluteus antony: 4-/5    Right hamstrin/5         Outcome Measures:  Other Measures  Lower Extremity Funtional Score (LEFS): 58  Treatments:  Standing hip flexor   Standing hip 3-way, YTB, 30 reps  Resisted hip flexion: YTB x20    Prone HSC, ---  SLR's, supine and prone, ---  Side-lying, abduction and clamshells, ---  Bridges, ---       Manual  Grade 1-2 hip mobs inferior glide, lateral glide, A>P glide  PROM R hip extension for hip flexor stretch    OP EDUCATION:       Goals:  1. Patient will be independent with HEP (MET)  2. Patient will demonstrate R hip strength >/=4/5 to improve pelvic stability with mobility  (PROGRESSING)  3. Patient will demonstrate >/=10 point improvement in LEFS demonstrating improved functional mobility  (PROGRESSING)  4. Patient will report R iliotibial band pain </=2/10 with sitting for meals (PROGRESSING)

## 2025-06-09 ENCOUNTER — EVALUATION (OUTPATIENT)
Dept: PHYSICAL THERAPY | Facility: HOSPITAL | Age: 70
End: 2025-06-09
Payer: MEDICARE

## 2025-06-09 DIAGNOSIS — M75.41 IMPINGEMENT SYNDROME OF RIGHT SHOULDER: ICD-10-CM

## 2025-06-09 DIAGNOSIS — R29.898 WEAKNESS OF RIGHT SHOULDER: ICD-10-CM

## 2025-06-09 DIAGNOSIS — M25.511 ACUTE PAIN OF RIGHT SHOULDER: Primary | ICD-10-CM

## 2025-06-09 PROCEDURE — 97110 THERAPEUTIC EXERCISES: CPT | Mod: GP

## 2025-06-09 PROCEDURE — 97161 PT EVAL LOW COMPLEX 20 MIN: CPT | Mod: GP

## 2025-06-09 ASSESSMENT — PAIN SCALES - GENERAL: PAINLEVEL_OUTOF10: 2

## 2025-06-09 ASSESSMENT — PATIENT HEALTH QUESTIONNAIRE - PHQ9
2. FEELING DOWN, DEPRESSED OR HOPELESS: NOT AT ALL
1. LITTLE INTEREST OR PLEASURE IN DOING THINGS: NOT AT ALL
SUM OF ALL RESPONSES TO PHQ9 QUESTIONS 1 AND 2: 0

## 2025-06-09 ASSESSMENT — PAIN - FUNCTIONAL ASSESSMENT: PAIN_FUNCTIONAL_ASSESSMENT: 0-10

## 2025-06-09 NOTE — LETTER
June 9, 2025    Tere Jenkins DPT  [address] [place], [address] [place] [address]    Patient: Sophia Granados   YOB: 1955   Date of Visit: 6/9/2025       Dear Haile Lorenz MD  [address]   [facility], [address] [place] [address],  [place] [address]    The attached plan of care is being sent to you because your patient’s medical reimbursement requires that you certify the plan of care. Your signature is required to allow uninterrupted insurance coverage.      You may indicate your approval by signing below and faxing this form back to us at Dept Fax: [phone].    Please call Dept: [phone] with any questions or concerns.    Thank you for this referral,        Tere Jenkins DPT  [facility] [address] [facility]  [address] [place] [id number]    Payer: Payor: MEDICARE / Plan: MEDICARE PART A AND B / Product Type: *No Product type* /                                                                         Date:     Dear Tere Jenkins DPT,     Re: Ms. Sophia Granados, MRN:[id number]    I certify that I have reviewed the attached plan of care and it is medically necessary for Ms. Sophia Granados (1955) who is under my care.          ______________________________________                    _________________  Provider name and credentials                                           Date and time                                                                                           Plan of Care 6/9/25   Effective from: 6/9/2025  Effective to: 9/7/2025    Plan ID: [id number]            Participants as of Finalize on 6/9/2025    Name Type Comments Contact Info    Haile Lorenz MD Referring Provider  [phone]    Tere Jenkins DPT Physical Therapist  [phone]       Last Plan Note     Author: Tere Jenkins DPT Status: Incomplete Last edited: 6/9/2025  2:45 PM       Physical Therapy    Physical Therapy  Evaluation and Treatment      Patient Name: Sophia Granados  MRN: [id number]  Today's Date: 6/9/2025    Time Entry:   Time Calculation  Start Time: 1446  Stop Time: 1519  Time Calculation (min): 33 min  PT Evaluation Time Entry  PT Evaluation (Low) Time Entry: 15  PT Therapeutic Procedures Time Entry  Therapeutic Exercise Time Entry: 18                 Insurance:  MEDICARE/ MMO                            COPAY 0   2410 ($0 USED ) DED 2576(MET) COV 80/100 OOP (MET)          MMO TO FOLLOW MEDICARE  NO AUTH REQ          # [id number]     Visit: 1/6    Assessment:  PT Assessment  PT Assessment Results: Decreased strength, Decreased range of motion, Decreased endurance, Decreased mobility, Pain  Rehab Prognosis: Good  Evaluation/Treatment Tolerance: Patient limited by pain   Patient is a  71 y/o female presents with c/o R shoulder pain with insidious onset. Upon examination patient demonstrates decreased R shoulder pain and strength with special tests positive limiting overall functional mobility including ability to reach overhead,  grandchildren,  groceries and perform recreational activities. Pt to benefit from outpatient PT to address deficits, maximize functional mobility and improve QOL.  The clinical presentation of this patient is stable and their history and examination findings are consistent with a low complexity evaluation with good rehab potential.        Plan:  OP PT Plan  Treatment/Interventions: Cryotherapy, Dry needling, Education/ Instruction, Manual therapy, Neuromuscular re-education, Self care/ home management, Taping techniques, Therapeutic activities, Therapeutic exercises  PT Plan: Skilled PT  PT Frequency: 1 time per week  Duration: 4 visits  Onset Date: 05/19/25  Certification Period Start Date: 06/09/25  Certification Period End Date: 09/07/25  Rehab Potential: Good  Plan of Care Agreement: Patient    Current Problem:   1. Acute pain of right shoulder  Follow  "Up In Physical Therapy      2. Impingement syndrome of right shoulder  Referral to Physical Therapy    Follow Up In Physical Therapy      3. Weakness of right shoulder  Follow Up In Physical Therapy          Subjective   General:  Medical History Form: Reviewed (scanned into chart)    Subjective:     Chief Complaint: Patient presents to clinic sudden onset R shoulder pain. Pain at night with rolling in bed. She will get awoken with pain. Difficulty getting R arm on window in the car.   Received a cortisone injection 6/2/25 that has helped with her symptoms.   Denies change in activity.   Does take care of grandchildren.  2/10 pain now since injection, denies pain at rest  Denies numbness and tingling with intermittent pain down at     Onset Date: 5/19/25  LILIYA: unsure    Current Condition:   Better    Pain:  Pain Assessment: 0-10  0-10 (Numeric) Pain Score: 2  Pain Type: Acute pain  Pain Location: Shoulder  Pain Orientation: Right  Location: R anterior shoulder  Description: stabbing, sharp   9/10 at its highest   Aggravating Factors:  mostly at night in bed, abduction, describes a painful arch  Relieving Factors:  injection, rest     Relevant Information (PMH & Previous Tests/Imaging):   Previous Interventions/Treatments: Injections    Prior Level of Function (PLOF)  Patient previously independent with all ADLs  Exercise/Physical Activity: cares for grandchildren   Work/School: retired     Patients Living Environment: Reviewed and no concern    Primary Language: English    Patient's Goal(s) for Therapy: \"painfree\"    Red Flags: Do you have any of the following? No  Fever/chills, unexplained weight changes, dizziness/fainting, unexplained change in bowel or bladder functions, unexplained malaise or muscle weakness, night pain/sweats, numbness or tingling       Precautions:  Precautions  Precautions Comment: none    Pain:  Pain Assessment  Pain Assessment: 0-10  0-10 (Numeric) Pain Score: 2  Pain Type: Acute " pain  Pain Location: Shoulder  Pain Orientation: Right     Prior Level of Function: lives with spouse, retired        Objective          PALPATION: R anterior shoulder near bicipital groove            POSTURE: forward head, rounded shoulders     AROM    Right shoulder flexion: 130      Left shoulder flexion: WFL      Right shoulder abduction: 104      Left shoulder abduction: WFL      Right shoulder extension: NT     Left shoulder extension: WFL      Right shoulder ER: 74     Left shoulder ER: WFL      Right HBB: T11      Left HBB: WFL      MMT    Deltoid flexion right: 3+/5     Deltoid flexion left: 4+/5      Deltoid abduction right: 3+/5      Deltoid abduction left: 4+/5      ER @ 0 degrees abduction right: 4-/5     ER @ 0 degrees abduction left: 4-/5      IR @ 0 degrees abduction right: 4-/5     IR @ 0 degrees abduction left: 4/5      ER @ 90 degrees abduction right: NT     ER @ 90 degrees abduction left: NT      IR @ 90 degrees abduction right: NT     IR @ 90 degrees abduction left: NT      Rhomboids right:  NT    Rhomboids left: NT      Middle trapezius right: NT      Middle trapezius left: NT      Lower trapezius right: NT      Lower trapezius left: NT      Special Tests   Rotator Cuff Pathology    Painful ARC: Right +    Drop Arm Test/External Lag Sign: Right -     Weakness/Pain with External Rotation: Right +     Night Pain: Right +     Lateral Lito Test: Right +    Subscapularis Pathology    Belly Press Test: Right -    Lift Off Test: Right -         Neer: Right +    Hawkin: Right +      Outcome Measures:  Other Measures  Disability of Arm Shoulder Hand (DASH): did not complete     Treatments:  AAROM shoulder flexion: painful  AAROM shoulder ER: x15  Pullies: x3 painful  Table slide F/scaption: x10 each    EDUCATION:  Outpatient Education  Individual(s) Educated: Patient  Education Provided: Anatomy, Home Exercise Program, Physiology, POC  Risk and Benefits Discussed with  Patient/Caregiver/Other: yes  Patient/Caregiver Demonstrated Understanding: yes  Plan of Care Discussed and Agreed Upon: yes  Patient Response to Education: Patient/Caregiver Verbalized Understanding of Information    Goals:  Patient will be independent with HEP  Patient will demonstrate R shoulder strength >/=4/5 to improve functional use  Patient will demonstrate R shoulder ROM WFL to be able to reach into overhead cabinets  Patient will demonstrate >/=20% improvement in Quick DASH demonstrating improved functional use  Patient will report R shoulder pain </=2/10 with activity                      Current Participants as of 6/9/2025    Name Type Comments Contact Info    Haile Lorenz MD Referring Provider  [phone]    Signature pending    Tere Jenkins DPT Physical Therapist  [phone]    Signature pending

## 2025-06-09 NOTE — PROGRESS NOTES
Physical Therapy    Physical Therapy Evaluation and Treatment      Patient Name: Sophia Granados  MRN: [id number]  Today's Date: 6/9/2025    Time Entry:   Time Calculation  Start Time: 1446  Stop Time: 1519  Time Calculation (min): 33 min  PT Evaluation Time Entry  PT Evaluation (Low) Time Entry: 15  PT Therapeutic Procedures Time Entry  Therapeutic Exercise Time Entry: 18                 Insurance:  MEDICARE/ MMO                            COPAY 0   2410 ($0 USED ) DED 2576(MET) COV 80/100 OOP (MET)          MMO TO FOLLOW MEDICARE  NO AUTH REQ          # [id number]     Visit: 1/6    Assessment:  PT Assessment  PT Assessment Results: Decreased strength, Decreased range of motion, Decreased endurance, Decreased mobility, Pain  Rehab Prognosis: Good  Evaluation/Treatment Tolerance: Patient limited by pain   Patient is a  71 y/o female presents with c/o R shoulder pain with insidious onset. Upon examination patient demonstrates decreased R shoulder pain and strength with special tests positive limiting overall functional mobility including ability to reach overhead,  grandchildren,  groceries and perform recreational activities. Pt to benefit from outpatient PT to address deficits, maximize functional mobility and improve QOL.  The clinical presentation of this patient is stable and their history and examination findings are consistent with a low complexity evaluation with good rehab potential.        Plan:  OP PT Plan  Treatment/Interventions: Cryotherapy, Dry needling, Education/ Instruction, Manual therapy, Neuromuscular re-education, Self care/ home management, Taping techniques, Therapeutic activities, Therapeutic exercises  PT Plan: Skilled PT  PT Frequency: 1 time per week  Duration: 4 visits  Onset Date: 05/19/25  Certification Period Start Date: 06/09/25  Certification Period End Date: 09/07/25  Rehab Potential: Good  Plan of Care Agreement: Patient    Current Problem:   1.  "Acute pain of right shoulder  Follow Up In Physical Therapy      2. Impingement syndrome of right shoulder  Referral to Physical Therapy    Follow Up In Physical Therapy      3. Weakness of right shoulder  Follow Up In Physical Therapy          Subjective    General:  Medical History Form: Reviewed (scanned into chart)    Subjective:     Chief Complaint: Patient presents to clinic sudden onset R shoulder pain. Pain at night with rolling in bed. She will get awoken with pain. Difficulty getting R arm on window in the car.   Received a cortisone injection 6/2/25 that has helped with her symptoms.   Denies change in activity.   Does take care of grandchildren.  2/10 pain now since injection, denies pain at rest  Denies numbness and tingling with intermittent pain down at     Onset Date: 5/19/25  LILIYA: unsure    Current Condition:   Better    Pain:  Pain Assessment: 0-10  0-10 (Numeric) Pain Score: 2  Pain Type: Acute pain  Pain Location: Shoulder  Pain Orientation: Right  Location: R anterior shoulder  Description: stabbing, sharp   9/10 at its highest   Aggravating Factors:  mostly at night in bed, abduction, describes a painful arch  Relieving Factors:  injection, rest     Relevant Information (PMH & Previous Tests/Imaging):   Previous Interventions/Treatments: Injections    Prior Level of Function (PLOF)  Patient previously independent with all ADLs  Exercise/Physical Activity: cares for grandchildren   Work/School: retired     Patients Living Environment: Reviewed and no concern    Primary Language: English    Patient's Goal(s) for Therapy: \"painfree\"    Red Flags: Do you have any of the following? No  Fever/chills, unexplained weight changes, dizziness/fainting, unexplained change in bowel or bladder functions, unexplained malaise or muscle weakness, night pain/sweats, numbness or tingling       Precautions:  Precautions  Precautions Comment: none    Pain:  Pain Assessment  Pain Assessment: 0-10  0-10 (Numeric) " Pain Score: 2  Pain Type: Acute pain  Pain Location: Shoulder  Pain Orientation: Right     Prior Level of Function: lives with spouse, retired        Objective           PALPATION: R anterior shoulder near bicipital groove            POSTURE: forward head, rounded shoulders     AROM    Right shoulder flexion: 130      Left shoulder flexion: WFL      Right shoulder abduction: 104      Left shoulder abduction: WFL      Right shoulder extension: NT     Left shoulder extension: WFL      Right shoulder ER: 74     Left shoulder ER: WFL      Right HBB: T11      Left HBB: WFL      MMT    Deltoid flexion right: 3+/5     Deltoid flexion left: 4+/5      Deltoid abduction right: 3+/5      Deltoid abduction left: 4+/5      ER @ 0 degrees abduction right: 4-/5     ER @ 0 degrees abduction left: 4-/5      IR @ 0 degrees abduction right: 4-/5     IR @ 0 degrees abduction left: 4/5      ER @ 90 degrees abduction right: NT     ER @ 90 degrees abduction left: NT      IR @ 90 degrees abduction right: NT     IR @ 90 degrees abduction left: NT      Rhomboids right:  NT    Rhomboids left: NT      Middle trapezius right: NT      Middle trapezius left: NT      Lower trapezius right: NT      Lower trapezius left: NT      Special Tests   Rotator Cuff Pathology    Painful ARC: Right +    Drop Arm Test/External Lag Sign: Right -     Weakness/Pain with External Rotation: Right +     Night Pain: Right +     Lateral Lito Test: Right +    Subscapularis Pathology    Belly Press Test: Right -    Lift Off Test: Right -         Neer: Right +    Hawkin: Right +      Outcome Measures:  Other Measures  Disability of Arm Shoulder Hand (DASH): did not complete     Treatments:  AAROM shoulder flexion: painful  AAROM shoulder ER: x15  Pullies: x3 painful  Table slide F/scaption: x10 each    EDUCATION:  Outpatient Education  Individual(s) Educated: Patient  Education Provided: Anatomy, Home Exercise Program, Physiology, POC  Risk and Benefits  Discussed with Patient/Caregiver/Other: yes  Patient/Caregiver Demonstrated Understanding: yes  Plan of Care Discussed and Agreed Upon: yes  Patient Response to Education: Patient/Caregiver Verbalized Understanding of Information    Goals:  Patient will be independent with HEP  Patient will demonstrate R shoulder strength >/=4/5 to improve functional use  Patient will demonstrate R shoulder ROM WFL to be able to reach into overhead cabinets  Patient will demonstrate >/=20% improvement in Quick DASH demonstrating improved functional use  Patient will report R shoulder pain </=2/10 with activity

## 2025-06-23 ENCOUNTER — APPOINTMENT (OUTPATIENT)
Dept: PHYSICAL THERAPY | Facility: HOSPITAL | Age: 70
End: 2025-06-23
Payer: MEDICARE

## 2025-06-23 DIAGNOSIS — M25.511 ACUTE PAIN OF RIGHT SHOULDER: Primary | ICD-10-CM

## 2025-06-23 DIAGNOSIS — M25.561 ACUTE PAIN OF RIGHT KNEE: ICD-10-CM

## 2025-06-27 ENCOUNTER — TREATMENT (OUTPATIENT)
Dept: PHYSICAL THERAPY | Facility: HOSPITAL | Age: 70
End: 2025-06-27
Payer: MEDICARE

## 2025-06-27 DIAGNOSIS — M75.41 IMPINGEMENT SYNDROME OF RIGHT SHOULDER: ICD-10-CM

## 2025-06-27 DIAGNOSIS — M25.511 ACUTE PAIN OF RIGHT SHOULDER: Primary | ICD-10-CM

## 2025-06-27 DIAGNOSIS — M25.561 ACUTE PAIN OF RIGHT KNEE: ICD-10-CM

## 2025-06-27 DIAGNOSIS — R29.898 WEAKNESS OF RIGHT SHOULDER: ICD-10-CM

## 2025-06-27 PROCEDURE — 97110 THERAPEUTIC EXERCISES: CPT | Mod: GP,CQ

## 2025-06-27 ASSESSMENT — PAIN SCALES - GENERAL: PAINLEVEL_OUTOF10: 3

## 2025-06-27 ASSESSMENT — PAIN - FUNCTIONAL ASSESSMENT: PAIN_FUNCTIONAL_ASSESSMENT: 0-10

## 2025-06-27 NOTE — PROGRESS NOTES
"Physical Therapy Treatment    Patient Name: Sophia Granados  MRN: [id number]  Today's Date: 6/27/2025     PT Therapeutic Procedures Time Entry  Therapeutic Exercise Time Entry: 38                   Current Problem  1. Acute pain of right shoulder  Follow Up In Physical Therapy      2. Acute pain of right knee        3. Impingement syndrome of right shoulder  Follow Up In Physical Therapy      4. Weakness of right shoulder  Follow Up In Physical Therapy          Insurance   MEDICARE/ [facility]     Visit 2      Subjective   Pt stating she feels like the injection is starting to wear off and things are starting to wake up.     Pain  Pain Assessment: 0-10  0-10 (Numeric) Pain Score: 3  Pain Type: Acute pain  Pain Location: Shoulder  Pain Orientation: Right      Objective     Treatments:  Pulleys flex 2x10  PB up the wall 3\" x10  Supine wand CP and flexion 2x10  Standing wand ER 2x10  Rows/LAE YTB 2x10  IR/ER YTB 2x10    Assessment:  Introduced light strengthening today with fair tolerance. Pt reporting discomfort with ER so therefore held off. Able to progress to PB up the wall. Updated HEP and reviewed, educated pt not to push into pain.     Plan:  Cont to progress within tolerance        "

## 2025-07-01 ENCOUNTER — TELEPHONE (OUTPATIENT)
Dept: PHYSICAL THERAPY | Facility: HOSPITAL | Age: 70
End: 2025-07-01
Payer: MEDICARE

## 2025-07-01 DIAGNOSIS — M25.561 ACUTE PAIN OF RIGHT KNEE: ICD-10-CM

## 2025-07-01 DIAGNOSIS — M25.511 ACUTE PAIN OF RIGHT SHOULDER: Primary | ICD-10-CM

## 2025-07-01 NOTE — PROGRESS NOTES
Patient no showed appointment today. Contacted patient. Patient thought her appointment was scheduled for tomorrow and not today. R/s for next week.

## 2025-07-09 ENCOUNTER — TELEPHONE (OUTPATIENT)
Dept: ORTHOPEDIC SURGERY | Facility: CLINIC | Age: 70
End: 2025-07-09
Payer: MEDICARE

## 2025-07-09 DIAGNOSIS — M17.11 PRIMARY OSTEOARTHRITIS OF RIGHT KNEE: ICD-10-CM

## 2025-07-11 ENCOUNTER — TREATMENT (OUTPATIENT)
Dept: PHYSICAL THERAPY | Facility: HOSPITAL | Age: 70
End: 2025-07-11
Payer: MEDICARE

## 2025-07-11 DIAGNOSIS — R29.898 WEAKNESS OF RIGHT SHOULDER: ICD-10-CM

## 2025-07-11 DIAGNOSIS — M25.561 ACUTE PAIN OF RIGHT KNEE: ICD-10-CM

## 2025-07-11 DIAGNOSIS — M25.511 ACUTE PAIN OF RIGHT SHOULDER: Primary | ICD-10-CM

## 2025-07-11 DIAGNOSIS — M75.41 IMPINGEMENT SYNDROME OF RIGHT SHOULDER: ICD-10-CM

## 2025-07-11 PROCEDURE — 97110 THERAPEUTIC EXERCISES: CPT | Mod: GP,CQ

## 2025-07-11 ASSESSMENT — PAIN - FUNCTIONAL ASSESSMENT: PAIN_FUNCTIONAL_ASSESSMENT: 0-10

## 2025-07-11 ASSESSMENT — PAIN SCALES - GENERAL: PAINLEVEL_OUTOF10: 2

## 2025-07-11 NOTE — PROGRESS NOTES
Physical Therapy Treatment    Patient Name: Sophia Granados  MRN: [id number]  Today's Date: 7/11/2025     PT Therapeutic Procedures Time Entry  Therapeutic Exercise Time Entry: 30                   Current Problem  1. Acute pain of right shoulder        2. Impingement syndrome of right shoulder        3. Weakness of right shoulder        4. Acute pain of right knee  Follow Up In Physical Therapy          Insurance   MEDICARE/ [facility]  COPAY 0     Visit# 3/4      Subjective   Pt stating things feel about the same.    Pain  Pain Assessment: 0-10  0-10 (Numeric) Pain Score: 2  Pain Type: Acute pain  Pain Location: Shoulder  Pain Orientation: Right      Objective     Treatments:    Pulleys flex 2x10  Supine wand CP and flexion 2x10  Standing wand ER 2x10  Rows/LAE RTB 2x10  IR RTB 2x10    Rocktape to R shoulder  Assessment:  Progressed resistance with theraband, pt reporting some discomfort with shoulder extension on her L shoulder. Held off on ER today. Applied rocktape for stability and pain relief.     Plan:  Re check next visit

## 2025-07-15 ENCOUNTER — TREATMENT (OUTPATIENT)
Dept: PHYSICAL THERAPY | Facility: HOSPITAL | Age: 70
End: 2025-07-15
Payer: MEDICARE

## 2025-07-15 DIAGNOSIS — M25.511 ACUTE PAIN OF RIGHT SHOULDER: Primary | ICD-10-CM

## 2025-07-15 DIAGNOSIS — M25.561 ACUTE PAIN OF RIGHT KNEE: ICD-10-CM

## 2025-07-15 PROCEDURE — 20560 NDL INSJ W/O NJX 1 OR 2 MUSC: CPT | Mod: GP

## 2025-07-15 PROCEDURE — 97110 THERAPEUTIC EXERCISES: CPT | Mod: GP

## 2025-07-15 ASSESSMENT — PAIN - FUNCTIONAL ASSESSMENT: PAIN_FUNCTIONAL_ASSESSMENT: 0-10

## 2025-07-15 ASSESSMENT — PAIN SCALES - GENERAL: PAINLEVEL_OUTOF10: 3

## 2025-07-15 NOTE — PROGRESS NOTES
Physical Therapy    Physical Therapy Treatment    Patient Name: Sophia Granados  MRN: [id number]  Today's Date: 7/15/2025    Time Entry:   Time Calculation  Start Time: 0745  Stop Time: 0827  Time Calculation (min): 42 min     PT Therapeutic Procedures Time Entry  Therapeutic Exercise Time Entry: 32  Manual Therapy Time Entry: 2  Needle Insertion w/o Injection 1 or 2: 8                 Insurance   MEDICARE/ MMO  COPAY 0      Visit# 4/8       Assessment:  PT Assessment  PT Assessment Results: Decreased strength, Decreased range of motion, Decreased endurance, Decreased mobility, Pain  Rehab Prognosis: Good  Patient has attended 4 PT visits for R shoulder pain. She notes overall improvement with R shoulder pain, however does demonstrate some worsening of ROM and no improvement with her strength. Dry needling initiated to R shoulder this date. Possible trial of electrical stimulation next visit pending outcome of today's visit. Decreased intensity of exercises this date with cues to not push into pain with understanding. She notes most relief with use of therapeutic exercise tape. She will continue to benefit from skilled PT to improve R shoulder ROM and strength to improve functional use and allow her to return to [facility].   Plan:  OP PT Plan  Treatment/Interventions: Dry needling, Education/ Instruction, Manual therapy, Neuromuscular re-education, Self care/ home management, Taping techniques, Therapeutic activities, Therapeutic exercises  PT Plan: Skilled PT  PT Frequency: 1 time per week  Duration: 4 visits  Rehab Potential: Good  Plan of Care Agreement: Patient    Current Problem  1. Acute pain of right shoulder        2. Acute pain of right knee            General   Notes that shoulder is about the same. Feels like injection is wearing. Notes ~70% overall improvement. Still having issues with sleeping, abduction and external rotation       Subjective    Precautions          Pain  Pain Assessment  Pain Assessment:  0-10  0-10 (Numeric) Pain Score: 3  Pain Type: Acute pain  Pain Location: Shoulder  Pain Orientation: Right    Objective     PALPATION: R anterior shoulder near bicipital groove                        POSTURE: forward head, rounded shoulders      AROM    Right shoulder flexion: 118     Left shoulder flexion: WFL      Right shoulder abduction: 71      Left shoulder abduction: WFL      Right shoulder extension: NT     Left shoulder extension: WFL      Right shoulder ER: 58     Left shoulder ER: WFL      Right HBB: T11      Left HBB: WFL       MMT    Deltoid flexion right: 3+/5     Deltoid flexion left: 4+/5      Deltoid abduction right: 3+/5      Deltoid abduction left: 4+/5      ER @ 0 degrees abduction right: 4-/5     ER @ 0 degrees abduction left: 4-/5      IR @ 0 degrees abduction right: 4-/5     IR @ 0 degrees abduction left: 4/5      ER @ 90 degrees abduction right: NT     ER @ 90 degrees abduction left: NT      IR @ 90 degrees abduction right: NT     IR @ 90 degrees abduction left: NT      Rhomboids right:  NT    Rhomboids left: NT      Middle trapezius right: NT      Middle trapezius left: NT      Lower trapezius right: NT      Lower trapezius left: NT       Special Tests              Rotator Cuff Pathology                          Painful ARC: Right +                          Drop Arm Test/External Lag Sign: Right -                           Weakness/Pain with External Rotation: Right +                           Night Pain: Right +                           Lateral Lito Test: Right +               Subscapularis Pathology                          Belly Press Test: Right -                          Lift Off Test: Right -                                                     Neer: Right +                          Hawkin: Right +         Outcome Measures:  Other Measures  Disability of Arm Shoulder Hand (DASH): 28 (Quick DASH: 38.64)  Treatments:  Pulleys flex 2x10  Side lying shoulder 4 way: 2x10 (no  flexion)  Supine ABC: x1  Supine serratus punch: x3 painful  Bent shoulder row/LAE: 2x10    Supine wand CP and flexion ---  Standing wand ER ---  Rows/LAE RTB ---       Therapeutic exercise tape to R shoulder for pain relief  Patient denies adverse reactions to application of tape. Educated patient that tape will last 3-5 days, ok to bathe and pat dry. Remove immediately if adverse reaction occurs such as blister, redness, itchiness. Patient reports understanding.      Dry needling   Dry needling performed this date to area R shoulder eyes and around acromion .  Informed consent obtained from patient and in medical chart. Area assessed for cleanliness. 25mm  needles inserted,   in situ      technique performed.   all needles removed, area inspected for adverse symptoms, none noted, patient educated in post- dry needling management and expected symptoms from treatment. all patient questions answered.      OP EDUCATION:       Goals:  Patient will be independent with HEP (PROGRESSING)   Patient will demonstrate R shoulder strength >/=4/5 to improve functional use (PROGRESSING)   Patient will demonstrate R shoulder ROM WFL to be able to reach into overhead cabinets (PROGRESSING)   Patient will demonstrate >/=20% improvement in Quick DASH demonstrating improved functional use (PROGRESSING)   Patient will report R shoulder pain </=2/10 with activity  (PROGRESSING)

## 2025-07-17 ENCOUNTER — TELEPHONE (OUTPATIENT)
Dept: ORTHOPEDIC SURGERY | Facility: CLINIC | Age: 70
End: 2025-07-17
Payer: MEDICARE

## 2025-07-17 NOTE — TELEPHONE ENCOUNTER
I left a message that she can  her order from any of our offices or have it mailed. She can call back if would like it mailed to her.

## 2025-07-17 NOTE — TELEPHONE ENCOUNTER
PT LVM STATING SHE LVM LAST FRIDAY ON RX LINE STATING SHE IS IN NEED OF RENEWAL OF HER HANDICAP PLACARD BUT HASN'T HEARD ANYTHING. HER NUMBER [phone].

## 2025-07-24 ENCOUNTER — TREATMENT (OUTPATIENT)
Dept: PHYSICAL THERAPY | Facility: HOSPITAL | Age: 70
End: 2025-07-24
Payer: MEDICARE

## 2025-07-24 DIAGNOSIS — M25.561 ACUTE PAIN OF RIGHT KNEE: ICD-10-CM

## 2025-07-24 DIAGNOSIS — M25.511 ACUTE PAIN OF RIGHT SHOULDER: Primary | ICD-10-CM

## 2025-07-24 DIAGNOSIS — M75.41 IMPINGEMENT SYNDROME OF RIGHT SHOULDER: ICD-10-CM

## 2025-07-24 DIAGNOSIS — R29.898 WEAKNESS OF RIGHT SHOULDER: ICD-10-CM

## 2025-07-24 PROCEDURE — 97110 THERAPEUTIC EXERCISES: CPT | Mod: GP

## 2025-07-24 PROCEDURE — 97032 APPL MODALITY 1+ESTIM EA 15: CPT | Mod: GP

## 2025-07-24 ASSESSMENT — PAIN - FUNCTIONAL ASSESSMENT: PAIN_FUNCTIONAL_ASSESSMENT: 0-10

## 2025-07-24 ASSESSMENT — PAIN SCALES - GENERAL: PAINLEVEL_OUTOF10: 2

## 2025-07-24 NOTE — PROGRESS NOTES
Physical Therapy    Physical Therapy Treatment    Patient Name: Sophia Granados  MRN: [id number]  Today's Date: 7/24/2025    Time Entry:   Time Calculation  Start Time: 0830  Stop Time: 0909  Time Calculation (min): 39 min     PT Therapeutic Procedures Time Entry  Therapeutic Exercise Time Entry: 31  PT Modalities Time Entry  E-Stim (Attended) Time Entry: 8            Insurance   MEDICARE/ MMO  COPAY 0      Visit# 5/8      Assessment:   Dry needling performed again with ability to utilize electrical stimulation this date. Noticeable decrease in pain following. Less pain with supine serratus punches today for serratus strengthening.   Pain 2.5/10  Plan:   Continue with dry needling for pain relief    Current Problem  1. Acute pain of right shoulder  Follow Up In Physical Therapy      2. Acute pain of right knee        3. Impingement syndrome of right shoulder  Follow Up In Physical Therapy      4. Weakness of right shoulder  Follow Up In Physical Therapy          General   Notes that she is feeling better. Would like to continue with dry needling.        Subjective    Precautions          Pain  Pain Assessment  Pain Assessment: 0-10  0-10 (Numeric) Pain Score: 2  Pain Type: Acute pain  Pain Location: Shoulder  Pain Orientation: Right    Objective     Treatments:  Pulleys flex 2x10  Supine shoulder flexion: 2x10  Side lying shoulder 4 way: 2x10 (no flexion)  Supine ABC: x1  Supine serratus punch: x10  Bent shoulder row/LAE: 2x10     Supine wand CP and flexion ---  Standing wand ER ---  Rows/LAE RTB ---        Therapeutic exercise tape to R shoulder for pain relief  Patient denies adverse reactions to application of tape. Educated patient that tape will last 3-5 days, ok to bathe and pat dry. Remove immediately if adverse reaction occurs such as blister, redness, itchiness. Patient reports understanding.        Dry needling   Dry needling performed this date to area R shoulder medial and lateral aspect of acromion (2 on  lateral aspect) .  Informed consent obtained from patient and in medical chart. Area assessed for cleanliness. 25mm  needles inserted,   in situ      technique performed.         electric stimulation applied for further management of soft tissue dysfunction for 6   minutes to lateral aspect of R shoulder.  all needles removed, area inspected for adverse symptoms, none noted, patient educated in post- dry needling management and expected symptoms from treatment. all patient questions answered.

## 2025-07-28 ENCOUNTER — TELEPHONE (OUTPATIENT)
Age: 70
End: 2025-07-28

## 2025-07-28 ENCOUNTER — APPOINTMENT (OUTPATIENT)
Dept: PHYSICAL THERAPY | Facility: HOSPITAL | Age: 70
End: 2025-07-28
Payer: MEDICARE

## 2025-07-28 DIAGNOSIS — N20.0 KIDNEY STONES: Primary | ICD-10-CM

## 2025-07-31 ENCOUNTER — TREATMENT (OUTPATIENT)
Dept: PHYSICAL THERAPY | Facility: HOSPITAL | Age: 70
End: 2025-07-31
Payer: MEDICARE

## 2025-07-31 DIAGNOSIS — R29.898 WEAKNESS OF RIGHT SHOULDER: ICD-10-CM

## 2025-07-31 DIAGNOSIS — M25.561 ACUTE PAIN OF RIGHT KNEE: ICD-10-CM

## 2025-07-31 DIAGNOSIS — M25.511 ACUTE PAIN OF RIGHT SHOULDER: Primary | ICD-10-CM

## 2025-07-31 DIAGNOSIS — M75.41 IMPINGEMENT SYNDROME OF RIGHT SHOULDER: ICD-10-CM

## 2025-07-31 PROCEDURE — 97032 APPL MODALITY 1+ESTIM EA 15: CPT | Mod: GP

## 2025-07-31 PROCEDURE — 97110 THERAPEUTIC EXERCISES: CPT | Mod: GP

## 2025-07-31 ASSESSMENT — PAIN - FUNCTIONAL ASSESSMENT: PAIN_FUNCTIONAL_ASSESSMENT: 0-10

## 2025-07-31 ASSESSMENT — PAIN SCALES - GENERAL: PAINLEVEL_OUTOF10: 1

## 2025-07-31 NOTE — PROGRESS NOTES
Physical Therapy    Physical Therapy Treatment    Patient Name: Sophia Granados  MRN: [id number]  Today's Date: 7/31/2025    Time Entry:   Time Calculation  Start Time: 0700  Stop Time: 0738  Time Calculation (min): 38 min     PT Therapeutic Procedures Time Entry  Therapeutic Exercise Time Entry: 30  PT Modalities Time Entry  E-Stim (Attended) Time Entry: 8               Insurance   MEDICARE/ MMO  COPAY 0      Visit# 6/8    Assessment:   Continued with dry needling today with increased time with electrical stimulation. Held on application of therapeutic exercise tape as skin appears to be irritated where tape has placed. Able to complete all exercises with minimal c/o pain. Did have pain at 90* shoulder abduction in sidelying   Plan:   Continue to improve strength and ROM of R arm    Current Problem  1. Acute pain of right shoulder  Follow Up In Physical Therapy      2. Acute pain of right knee        3. Impingement syndrome of right shoulder  Follow Up In Physical Therapy      4. Weakness of right shoulder  Follow Up In Physical Therapy          General   Shoulder feels good. Notes overall improvement and lessening pain.        Subjective    Precautions          Pain  Pain Assessment  Pain Assessment: 0-10  0-10 (Numeric) Pain Score: 1  Pain Type: Acute pain  Pain Location: Shoulder  Pain Orientation: Right    Objective   Skin irritation noted along where therapeutic exercise tape was placed     Treatments:  Pulleys flex 2x10  Supine shoulder flexion: 2x10  Side lying shoulder 4 way: 2x10 (no flexion)  Supine ABC: x1  Supine serratus punch: x10   row/LAE: RTB 2x10     Supine wand CP and flexion ---  Standing wand ER ---       (Held on taping d/t skin irritation)   Therapeutic exercise tape to R shoulder for pain relief  Patient denies adverse reactions to application of tape. Educated patient that tape will last 3-5 days, ok to bathe and pat dry. Remove immediately if adverse reaction occurs such as blister, redness,  itchiness. Patient reports understanding.        Dry needling   Dry needling performed this date to area R shoulder medial and lateral aspect of acromion (2 on lateral aspect) .  Informed consent obtained from patient and in medical chart. Area assessed for cleanliness. 25mm  needles inserted,   in situ      technique performed.         electric stimulation applied for further management of soft tissue dysfunction for 8   minutes to lateral aspect of R shoulder.  all needles removed, area inspected for adverse symptoms, none noted, patient educated in post- dry needling management and expected symptoms from treatment. all patient questions answered.

## 2025-08-04 ENCOUNTER — TREATMENT (OUTPATIENT)
Dept: PHYSICAL THERAPY | Facility: HOSPITAL | Age: 70
End: 2025-08-04
Payer: MEDICARE

## 2025-08-04 DIAGNOSIS — M25.511 ACUTE PAIN OF RIGHT SHOULDER: Primary | ICD-10-CM

## 2025-08-04 DIAGNOSIS — M25.561 ACUTE PAIN OF RIGHT KNEE: ICD-10-CM

## 2025-08-04 DIAGNOSIS — M75.41 IMPINGEMENT SYNDROME OF RIGHT SHOULDER: ICD-10-CM

## 2025-08-04 DIAGNOSIS — R29.898 WEAKNESS OF RIGHT SHOULDER: ICD-10-CM

## 2025-08-04 PROCEDURE — 97032 APPL MODALITY 1+ESTIM EA 15: CPT | Mod: GP

## 2025-08-04 PROCEDURE — 97110 THERAPEUTIC EXERCISES: CPT | Mod: GP

## 2025-08-04 ASSESSMENT — PAIN SCALES - GENERAL: PAINLEVEL_OUTOF10: 1

## 2025-08-04 ASSESSMENT — PAIN - FUNCTIONAL ASSESSMENT: PAIN_FUNCTIONAL_ASSESSMENT: 0-10

## 2025-08-04 NOTE — PROGRESS NOTES
Physical Therapy    Physical Therapy Treatment    Patient Name: Sophia Granados  MRN: [id number]  Today's Date: 8/4/2025    Time Entry:   Time Calculation  Start Time: 0700  Stop Time: 0739  Time Calculation (min): 39 min     PT Therapeutic Procedures Time Entry  Therapeutic Exercise Time Entry: 31  PT Modalities Time Entry  E-Stim (Attended) Time Entry: 8               Insurance   MEDICARE/ MMO  COPAY 0      Visit# 7/8    Assessment:   Continued with dry needling with electrical stimulation for pain relief. Progressed resistance exercises today with pain with external rotation. She did also have pain with wall slides with abduction today. Applied therapeutic exercise tape to R shoulder for pain relief. Educated to only leave on for 5 days as she has some skin breakdown following her last application that was on over 7 days.   Plan:   reassessment    Current Problem  1. Acute pain of right shoulder  Follow Up In Physical Therapy      2. Acute pain of right knee        3. Impingement syndrome of right shoulder  Follow Up In Physical Therapy      4. Weakness of right shoulder  Follow Up In Physical Therapy          General   Notes overall improvement in R shoulder pain. Pain is 0.5/10 this morning. Would like to continue with dry needling.        Subjective    Precautions          Pain  Pain Assessment  Pain Assessment: 0-10  0-10 (Numeric) Pain Score: 1  Pain Type: Acute pain  Pain Location: Shoulder  Pain Orientation: Right    Objective     Treatments:  UBE: 2'/2'  Row/LAE: RTB 2x10  IR/ER: RTB IR x20, ER x13 (painful)  Tricep/bicep: RTB x20  Wall slide flex/abd: 2x10 each  Wall ABC: x1  Supine serratus punch: 2x10  Supine ABC: x1  Supine shoulder flexion: 2x10  Side lying shoulder 4 way: ---      Therapeutic exercise tape to R shoulder for pain relief  Patient denies adverse reactions to application of tape. Educated patient that tape will last 3-5 days, ok to bathe and pat dry. Remove immediately if adverse reaction  occurs such as blister, redness, itchiness. Patient reports understanding.        Dry needling   Dry needling performed this date to area R shoulder medial and lateral aspect of acromion (2 on lateral aspect) .  Informed consent obtained from patient and in medical chart. Area assessed for cleanliness. 25mm  needles inserted,   in situ      technique performed.         electric stimulation applied for further management of soft tissue dysfunction for 8   minutes to lateral aspect of R shoulder.  all needles removed, area inspected for adverse symptoms, none noted, patient educated in post- dry needling management and expected symptoms from treatment. all patient questions answered.

## 2025-08-06 ENCOUNTER — APPOINTMENT (OUTPATIENT)
Dept: ORTHOPEDIC SURGERY | Facility: CLINIC | Age: 70
End: 2025-08-06
Payer: MEDICARE

## 2025-08-15 ENCOUNTER — TELEPHONE (OUTPATIENT)
Dept: OBGYN CLINIC | Age: 70
End: 2025-08-15

## 2025-08-15 DIAGNOSIS — Z12.31 SCREENING MAMMOGRAM FOR BREAST CANCER: Primary | ICD-10-CM

## 2025-08-18 ENCOUNTER — HOSPITAL ENCOUNTER (OUTPATIENT)
Dept: GENERAL RADIOLOGY | Age: 70
Discharge: HOME | End: 2025-08-20
Attending: UROLOGY
Payer: MEDICARE

## 2025-08-18 ENCOUNTER — OFFICE VISIT (OUTPATIENT)
Age: 70
End: 2025-08-18
Attending: UROLOGY
Payer: MEDICARE

## 2025-08-18 ENCOUNTER — TREATMENT (OUTPATIENT)
Dept: PHYSICAL THERAPY | Facility: HOSPITAL | Age: 70
End: 2025-08-18
Payer: MEDICARE

## 2025-08-18 VITALS
WEIGHT: 219 LBS | OXYGEN SATURATION: 96 % | BODY MASS INDEX: 33.19 KG/M2 | HEIGHT: 68 IN | SYSTOLIC BLOOD PRESSURE: 138 MMHG | HEART RATE: 80 BPM | DIASTOLIC BLOOD PRESSURE: 68 MMHG

## 2025-08-18 DIAGNOSIS — N20.0 KIDNEY STONES: ICD-10-CM

## 2025-08-18 DIAGNOSIS — M75.41 IMPINGEMENT SYNDROME OF RIGHT SHOULDER: ICD-10-CM

## 2025-08-18 DIAGNOSIS — M25.561 ACUTE PAIN OF RIGHT KNEE: ICD-10-CM

## 2025-08-18 DIAGNOSIS — M25.511 ACUTE PAIN OF RIGHT SHOULDER: Primary | ICD-10-CM

## 2025-08-18 DIAGNOSIS — Z87.442 HISTORY OF KIDNEY STONES: Primary | ICD-10-CM

## 2025-08-18 DIAGNOSIS — R29.898 WEAKNESS OF RIGHT SHOULDER: ICD-10-CM

## 2025-08-18 PROCEDURE — 3078F DIAST BP <80 MM HG: CPT | Performed by: UROLOGY

## 2025-08-18 PROCEDURE — 3017F COLORECTAL CA SCREEN DOC REV: CPT | Performed by: UROLOGY

## 2025-08-18 PROCEDURE — G8399 PT W/DXA RESULTS DOCUMENT: HCPCS | Performed by: UROLOGY

## 2025-08-18 PROCEDURE — 99211 OFF/OP EST MAY X REQ PHY/QHP: CPT | Performed by: UROLOGY

## 2025-08-18 PROCEDURE — 97110 THERAPEUTIC EXERCISES: CPT | Mod: GP

## 2025-08-18 PROCEDURE — 99212 OFFICE O/P EST SF 10 MIN: CPT | Performed by: UROLOGY

## 2025-08-18 PROCEDURE — G8427 DOCREV CUR MEDS BY ELIG CLIN: HCPCS | Performed by: UROLOGY

## 2025-08-18 PROCEDURE — 3075F SYST BP GE 130 - 139MM HG: CPT | Performed by: UROLOGY

## 2025-08-18 PROCEDURE — 1036F TOBACCO NON-USER: CPT | Performed by: UROLOGY

## 2025-08-18 PROCEDURE — G8417 CALC BMI ABV UP PARAM F/U: HCPCS | Performed by: UROLOGY

## 2025-08-18 PROCEDURE — 1159F MED LIST DOCD IN RCRD: CPT | Performed by: UROLOGY

## 2025-08-18 PROCEDURE — 74018 RADEX ABDOMEN 1 VIEW: CPT

## 2025-08-18 PROCEDURE — 1160F RVW MEDS BY RX/DR IN RCRD: CPT | Performed by: UROLOGY

## 2025-08-18 PROCEDURE — 1090F PRES/ABSN URINE INCON ASSESS: CPT | Performed by: UROLOGY

## 2025-08-18 PROCEDURE — 1123F ACP DISCUSS/DSCN MKR DOCD: CPT | Performed by: UROLOGY

## 2025-08-18 ASSESSMENT — ENCOUNTER SYMPTOMS: ABDOMINAL PAIN: 0

## 2025-08-28 RX ORDER — AMLODIPINE BESYLATE 5 MG/1
5 TABLET ORAL DAILY
Qty: 90 TABLET | Refills: 1 | Status: SHIPPED | OUTPATIENT
Start: 2025-08-28

## 2025-09-02 ENCOUNTER — HOSPITAL ENCOUNTER (OUTPATIENT)
Dept: WOMENS IMAGING | Age: 70
Discharge: HOME | End: 2025-09-04
Payer: MEDICARE

## 2025-09-02 DIAGNOSIS — Z12.31 SCREENING MAMMOGRAM FOR BREAST CANCER: ICD-10-CM

## 2025-09-02 PROCEDURE — 77067 SCR MAMMO BI INCL CAD: CPT

## 2025-09-03 ENCOUNTER — APPOINTMENT (OUTPATIENT)
Dept: ORTHOPEDIC SURGERY | Facility: CLINIC | Age: 70
End: 2025-09-03
Payer: MEDICARE

## (undated) DEVICE — PADDING UNDERCAST W6INXL4YD RAYON POLY SYN NONADHESIVE

## (undated) DEVICE — TRAY PREP DRY W/ PREM GLV 2 APPL 6 SPNG 2 UNDPD 1 OVERWRAP

## (undated) DEVICE — TUBE SET 96 MM 64 MM H2O PERISTALTIC STD AUX CHANNEL

## (undated) DEVICE — MARKER SURG SKIN GENTIAN VLT REG TIP W/ 6IN RUL

## (undated) DEVICE — BAG DRAINAGE URIN LIGEMAN W/ ADPT SUCTION HOSE CYSTO URO

## (undated) DEVICE — BRUSH ENDO CLN L90.5IN SHTH DIA1.7MM BRIST DIA5-7MM 2-6MM

## (undated) DEVICE — COTTON UNDERCAST PADDING,REGULAR FINISH: Brand: WEBRIL

## (undated) DEVICE — PACK PROCEDURE SURG TOTAL KNEE PACK

## (undated) DEVICE — Device: Brand: ENDO SMARTCAP

## (undated) DEVICE — APPLICATOR MEDICATED 26 CC SOLUTION HI LT ORNG CHLORAPREP

## (undated) DEVICE — 3 BONE CEMENT MIXER WITH SPATULA: Brand: MIXEVAC, ACM

## (undated) DEVICE — BONE PREPARATION KIT: Brand: BIOPREP

## (undated) DEVICE — SINGLE PORT MANIFOLD: Brand: NEPTUNE 2

## (undated) DEVICE — GLOVE ORANGE PI 8   MSG9080

## (undated) DEVICE — RENTAL ESWL SERVICES UNILATERAL

## (undated) DEVICE — MAT FLOOR ULTRA ABS 28X48IN

## (undated) DEVICE — DBD-PACK,CYSTOSCOPY,PK VI,AURORA: Brand: MEDLINE

## (undated) DEVICE — GLOVE SURG SZ 8 L12IN FNGR THK13MIL BRN LTX SYN POLYMER W

## (undated) DEVICE — LABEL MED MINI W/ MARKER

## (undated) DEVICE — GRASPING FORCEPS: Brand: COOK

## (undated) DEVICE — SET,IRRIGATION,CYSTO/TUR: Brand: MEDLINE

## (undated) DEVICE — BANDAGE COMPR M W6INXL10YD WHT BGE VELC E MTRX HK AND LOOP

## (undated) DEVICE — BANDAGE COMPR W2INXL5YD WHT BGE POLY COT M E WRP WV HK AND

## (undated) DEVICE — SYRINGE MED 10ML LUERLOCK TIP W/O SFTY DISP

## (undated) DEVICE — GOWN,AURORA,NONREINFORCED,LARGE: Brand: MEDLINE

## (undated) DEVICE — GAUZE,SPONGE,FLUFF,6"X6.75",STRL,10/TRAY: Brand: MEDLINE

## (undated) DEVICE — SYRINGE IRRIG 60ML SFT PLIABLE BLB EZ TO GRP 1 HND USE W/

## (undated) DEVICE — SUTURE ETHLN SZ 4-0 L18IN NONABSORBABLE BLK L19MM PS-2 3/8 1667H

## (undated) DEVICE — SPONGE GZ W4XL4IN RAYON POLY FILL CVR W/ NONWOVEN FAB

## (undated) DEVICE — GLOVE ORANGE PI 7 1/2   MSG9075

## (undated) DEVICE — SUTURE ETHBND EXCEL SZ 1 L30IN NONABSORBABLE GRN L48MM CTX X865H

## (undated) DEVICE — GOWN,SIRUS,POLYRNF,BRTHSLV,XLN/XL,20/CS: Brand: MEDLINE

## (undated) DEVICE — TUBING, SUCTION, 1/4" X 10', STRAIGHT: Brand: MEDLINE

## (undated) DEVICE — CORD,CAUTERY,BIPOLAR,STERILE: Brand: MEDLINE

## (undated) DEVICE — PADDING CAST W6INXL4YD RAYON UNDERCAST SOF-ROL

## (undated) DEVICE — ELECTRODE PT RET AD L9FT HI MOIST COND ADH HYDRGEL CORDED

## (undated) DEVICE — 3M™ STERI-DRAPE™ U-DRAPE 1015: Brand: STERI-DRAPE™

## (undated) DEVICE — HAND II: Brand: MEDLINE INDUSTRIES, INC.

## (undated) DEVICE — GUIDEWIRE URO L150CM DIA0.035IN TAPR 8CM STR TIP STD SHFT

## (undated) DEVICE — TOTAL TRAY, DB, 100% SILI FOLEY, 16FR 10: Brand: MEDLINE

## (undated) DEVICE — CONTAINER,SPECIMEN,OR STERILE,4OZ: Brand: MEDLINE

## (undated) DEVICE — SNARE ENDOSCP POLYP 2.4 MM 240 CM 10 MM 2.8 MM CAPTIVATOR

## (undated) DEVICE — GLOVE ORTHO 8   MSG9480

## (undated) DEVICE — SYSTEM SKIN CLSR 22CM DERMBND PRINEO

## (undated) DEVICE — T4 HOOD

## (undated) DEVICE — PADDING UNDERCAST W4INXL12FT RAYON POLY SYN NONADHESIVE

## (undated) DEVICE — TRAP POLYP BALEEN

## (undated) DEVICE — SPONGE GZ W4XL4IN COT 12 PLY TYP VII WVN C FLD DSGN

## (undated) DEVICE — SNARE ENDOSCP AD L240CM LOOP W10MM SHTH DIA2.4MM RND INSUL

## (undated) DEVICE — ZIMMER® STERILE DISPOSABLE TOURNIQUET CUFF, DUAL PORT, SINGLE BLADDER, 18 IN. (46 CM)

## (undated) DEVICE — DRESSING GZ W1XL8IN COT XRFRM N ADH OVERWRAP CURAD

## (undated) DEVICE — JELLY,LUBE,STERILE,FLIP TOP,TUBE,2-OZ: Brand: MEDLINE

## (undated) DEVICE — COVER LT HNDL BLU PLAS

## (undated) DEVICE — BIPOLAR SEALER 23-112-1 AQM 6.0: Brand: AQUAMANTYS ®

## (undated) DEVICE — ANGLED TIP URETERAL CATHETER WITH BENTSON PTFE WIRE GUIDE: Brand: ANGLED TIP

## (undated) DEVICE — SUTURE VCRL SZ 2-0 L36IN ABSRB UD L36MM CT-1 1/2 CIR J945H

## (undated) DEVICE — TUBING IRRIGATION 140/160/180/190 SER GI ENDOSCP SMARTCAP

## (undated) DEVICE — RESERVOIR SUCT 400ML SIL

## (undated) DEVICE — DRAIN SURG 10FR L1/8IN RND CHN FULL FLUT HEAT POLISHED PERF

## (undated) DEVICE — DISP SPLIT ADULT RETURN ELECTRODE W/3M CABLE 50/BX: Brand: BOVIE

## (undated) DEVICE — DUAL CUT SAGITTAL BLADE

## (undated) DEVICE — ZIMMER® STERILE DISPOSABLE TOURNIQUET CUFF, DUAL PORT, SINGLE BLADDER, 34 IN. (86 CM)

## (undated) DEVICE — SUTURE MCRYL + SZ 3-0 L36IN ABSRB UD CT-1 L36MM 1/2 CIR MCP944H

## (undated) DEVICE — CATHETER SCLERO L240CM NDL 25GA L4MM SHTH DIA2.3MM CNTRST

## (undated) DEVICE — SUTURE STRATAFIX SPRL MCRYL + SZ 3-0 L18IN ABSRB UD PS-2 SXMP1B107

## (undated) DEVICE — ELEVIEW SUBMUCOSAL INJECTABLE COMPOSITION 10ML

## (undated) DEVICE — BANDAGE COMPR SINGLE LAYERED 9 FTX6 IN EXSANG WHT ESMARCH

## (undated) DEVICE — SPONGE DRN W4XL4IN RAYON/POLYESTER 6 PLY NONWOVEN PRECUT